# Patient Record
Sex: FEMALE | Race: BLACK OR AFRICAN AMERICAN | NOT HISPANIC OR LATINO | Employment: UNEMPLOYED | URBAN - METROPOLITAN AREA
[De-identification: names, ages, dates, MRNs, and addresses within clinical notes are randomized per-mention and may not be internally consistent; named-entity substitution may affect disease eponyms.]

---

## 2017-08-12 ENCOUNTER — HOSPITAL ENCOUNTER (EMERGENCY)
Facility: HOSPITAL | Age: 62
Discharge: HOME | End: 2017-08-12
Attending: EMERGENCY MEDICINE
Payer: MEDICAID

## 2017-08-12 VITALS
WEIGHT: 165 LBS | OXYGEN SATURATION: 98 % | HEIGHT: 61 IN | DIASTOLIC BLOOD PRESSURE: 82 MMHG | RESPIRATION RATE: 18 BRPM | HEART RATE: 88 BPM | SYSTOLIC BLOOD PRESSURE: 142 MMHG | BODY MASS INDEX: 31.15 KG/M2 | TEMPERATURE: 99 F

## 2017-08-12 DIAGNOSIS — T63.481A LOCAL REACTION TO INSECT STING, ACCIDENTAL OR UNINTENTIONAL, INITIAL ENCOUNTER: Primary | ICD-10-CM

## 2017-08-12 PROCEDURE — 63600175 PHARM REV CODE 636 W HCPCS: Performed by: NURSE PRACTITIONER

## 2017-08-12 PROCEDURE — 99283 EMERGENCY DEPT VISIT LOW MDM: CPT | Mod: 25

## 2017-08-12 PROCEDURE — 96372 THER/PROPH/DIAG INJ SC/IM: CPT

## 2017-08-12 PROCEDURE — 25000003 PHARM REV CODE 250: Performed by: NURSE PRACTITIONER

## 2017-08-12 RX ORDER — CETIRIZINE HYDROCHLORIDE 10 MG/1
10 TABLET ORAL
Status: COMPLETED | OUTPATIENT
Start: 2017-08-12 | End: 2017-08-12

## 2017-08-12 RX ORDER — FAMOTIDINE 20 MG/1
20 TABLET, FILM COATED ORAL
Status: COMPLETED | OUTPATIENT
Start: 2017-08-12 | End: 2017-08-12

## 2017-08-12 RX ORDER — METHYLPREDNISOLONE SODIUM SUCCINATE 125 MG/2ML
125 INJECTION INTRAMUSCULAR; INTRAVENOUS
Status: COMPLETED | OUTPATIENT
Start: 2017-08-12 | End: 2017-08-12

## 2017-08-12 RX ADMIN — CETIRIZINE HYDROCHLORIDE 10 MG: 10 TABLET, FILM COATED ORAL at 11:08

## 2017-08-12 RX ADMIN — METHYLPREDNISOLONE SODIUM SUCCINATE 125 MG: 125 INJECTION, POWDER, FOR SOLUTION INTRAMUSCULAR; INTRAVENOUS at 11:08

## 2017-08-12 RX ADMIN — FAMOTIDINE 20 MG: 20 TABLET, FILM COATED ORAL at 11:08

## 2017-08-13 NOTE — DISCHARGE INSTRUCTIONS
Take zyrtec and pepcid over the counter for one week.  Return to the Emergency department for any worsening or failure to improve, otherwise follow up with your primary care provider.

## 2017-08-13 NOTE — ED TRIAGE NOTES
Patient states she was sitting outside and something bit her left upper arm and left ankle. Patient stats she took benadryl at 730 pm. Patient states the swelling is worse and painful.

## 2017-08-13 NOTE — ED PROVIDER NOTES
Encounter Date: 8/12/2017       History     Chief Complaint   Patient presents with    Insect Bite     Stated was bit today by and insect now her left lower arm above her elbow is swollen,burning,and is redden feels like its on fire happen yesterday evening..Stated took Benadryl po early today.     Chief complaint: Insect bite    History of present illness: Patient is a 62-year-old female who presents for emergency consideration of a one-day history of left arm insect bite.  She states that she was stung or bitten by an insect on the left arm and since then has had constant itching and burning that has become worsening and is currently at 10 out of 10 in severity.  She tried hydrocortisone cream is been ineffective.  She denies fever, chills, shortness of breath, wheezing or area and reports some nausea.    HPI  Review of patient's allergies indicates:   Allergen Reactions    Benadryl [diphenhydramine hcl]      cuase her bp to elevate    Prednisone Other (See Comments)     Reactions:  Increased blood pressure    Penicillins      Past Medical History:   Diagnosis Date    High cholesterol     Hypertension      Past Surgical History:   Procedure Laterality Date    BUNIONECTOMY      LEG TENDON SURGERY Right      Family History   Problem Relation Age of Onset    Cancer Mother     No Known Problems Father      Social History   Substance Use Topics    Smoking status: Never Smoker    Smokeless tobacco: Never Used    Alcohol use Yes      Comment: 3-6 beers per night     Review of Systems   Constitutional: Negative for chills, fatigue and fever.   HENT: Negative for congestion, ear discharge, ear pain, postnasal drip, rhinorrhea, sinus pressure, sneezing, sore throat and voice change.    Eyes: Negative for discharge and itching.   Respiratory: Negative for cough, shortness of breath and wheezing.    Cardiovascular: Negative for chest pain, palpitations and leg swelling.   Gastrointestinal: Negative for abdominal  pain, constipation, diarrhea, nausea and vomiting.   Endocrine: Negative for polydipsia, polyphagia and polyuria.   Genitourinary: Negative for dysuria, frequency, hematuria, urgency, vaginal bleeding, vaginal discharge and vaginal pain.   Musculoskeletal: Negative for arthralgias and myalgias.   Skin: Positive for rash. Negative for wound.   Neurological: Negative for dizziness, seizures, syncope, weakness and numbness.   Hematological: Negative for adenopathy. Does not bruise/bleed easily.   Psychiatric/Behavioral: Negative for self-injury and suicidal ideas. The patient is not nervous/anxious.        Physical Exam     Initial Vitals [08/12/17 2205]   BP Pulse Resp Temp SpO2   (!) 152/100 94 12 99 °F (37.2 °C) 97 %      MAP       117.33         Physical Exam    Nursing note and vitals reviewed.  Constitutional: She appears well-developed and well-nourished.   HENT:   Head: Normocephalic and atraumatic.   Right Ear: External ear normal.   Left Ear: External ear normal.   Nose: Nose normal.   Eyes: Conjunctivae and EOM are normal. Pupils are equal, round, and reactive to light. Right eye exhibits no discharge. Left eye exhibits no discharge.   Neck: Normal range of motion.   Abdominal: She exhibits no distension.   Musculoskeletal: Normal range of motion.   Neurological: She is alert and oriented to person, place, and time.   Skin: Skin is dry. Capillary refill takes less than 2 seconds. Rash noted. Rash is urticarial (to left upper arm).         ED Course   Procedures  Labs Reviewed - No data to display          Medical Decision Making:   Initial Assessment:   62 y.o. female presents for emergent evaluation of rash to the left upper arm  ED Management:  Following a thorough history and physical, the patient was given site Medrol 125 IM as well as 20 mg of Pepcid and 10 mg of Zyrtec by mouth.  Patient has no difficulty breathing so anaphylaxis is unlikely.  Also her pulse ox is 97%.  Also unlikely is angioedema as  she has no facial swelling.  She understands that she should return to the emergency department for any failure to resolve or worsening.  Care of the patient discussed with Dr. Robles who agreed with the assessment and plan.                Attending Attestation:     Physician Attestation Statement for NP/PA:   I discussed this assessment and plan of this patient with the NP/PA, but I did not personally examine the patient. The face to face encounter was performed by the NP/PA.    Other NP/PA Attestation Additions:      Medical Decision Making: Agree with assessment and plan.                 ED Course     Clinical Impression:   The encounter diagnosis was Local reaction to insect sting, accidental or unintentional, initial encounter.    Disposition:   Disposition: Discharged  Condition: Stable                        Augusto Oden DNP  08/12/17 2315       Alfonso Robles MD  08/13/17 0000

## 2018-02-05 ENCOUNTER — HOSPITAL ENCOUNTER (EMERGENCY)
Facility: HOSPITAL | Age: 63
Discharge: HOME | End: 2018-02-05
Attending: EMERGENCY MEDICINE
Payer: MEDICAID

## 2018-02-05 VITALS
BODY MASS INDEX: 31.15 KG/M2 | HEART RATE: 82 BPM | RESPIRATION RATE: 18 BRPM | SYSTOLIC BLOOD PRESSURE: 165 MMHG | DIASTOLIC BLOOD PRESSURE: 95 MMHG | OXYGEN SATURATION: 96 % | WEIGHT: 165 LBS | HEIGHT: 61 IN | TEMPERATURE: 99 F

## 2018-02-05 DIAGNOSIS — M25.569 MEDIAL KNEE PAIN, UNSPECIFIED LATERALITY: ICD-10-CM

## 2018-02-05 DIAGNOSIS — R52 PAIN: ICD-10-CM

## 2018-02-05 DIAGNOSIS — M25.562 ACUTE PAIN OF LEFT KNEE: Primary | ICD-10-CM

## 2018-02-05 PROCEDURE — 99283 EMERGENCY DEPT VISIT LOW MDM: CPT | Mod: 25

## 2018-02-05 PROCEDURE — 63600175 PHARM REV CODE 636 W HCPCS: Performed by: EMERGENCY MEDICINE

## 2018-02-05 PROCEDURE — 96372 THER/PROPH/DIAG INJ SC/IM: CPT

## 2018-02-05 RX ORDER — HYDRALAZINE HYDROCHLORIDE 20 MG/ML
10 INJECTION INTRAMUSCULAR; INTRAVENOUS ONCE
Status: DISCONTINUED | OUTPATIENT
Start: 2018-02-05 | End: 2018-02-05 | Stop reason: HOSPADM

## 2018-02-05 RX ORDER — NALBUPHINE HYDROCHLORIDE 10 MG/ML
5 INJECTION, SOLUTION INTRAMUSCULAR; INTRAVENOUS; SUBCUTANEOUS ONCE
Status: COMPLETED | OUTPATIENT
Start: 2018-02-05 | End: 2018-02-05

## 2018-02-05 RX ORDER — KETOROLAC TROMETHAMINE 30 MG/ML
30 INJECTION, SOLUTION INTRAMUSCULAR; INTRAVENOUS
Status: COMPLETED | OUTPATIENT
Start: 2018-02-05 | End: 2018-02-05

## 2018-02-05 RX ADMIN — NALBUPHINE HYDROCHLORIDE 5 MG: 10 INJECTION, SOLUTION INTRAMUSCULAR; INTRAVENOUS; SUBCUTANEOUS at 05:02

## 2018-02-05 RX ADMIN — KETOROLAC TROMETHAMINE 30 MG: 30 INJECTION, SOLUTION INTRAMUSCULAR at 05:02

## 2018-02-05 NOTE — ED NOTES
Past Medical History:   Diagnosis Date    High cholesterol     Hypertension      Pt presented to ed with co left knee to foot pain and radiating to thigh.. Pt states she has chronic knee pain.  Also report left great toe pain.  Taking Advil with no relief.

## 2018-02-05 NOTE — ED PROVIDER NOTES
"Encounter Date: 2/5/2018       History     Chief Complaint   Patient presents with    Knee Pain     States she has right knee pain that radiates to her foot.       62 F with (r) knee pain for several days; no acute trauma. On going issue.  States pain "goes down to my foot."  No concommitant back pain.          Review of patient's allergies indicates:   Allergen Reactions    Benadryl [diphenhydramine hcl]      cuase her bp to elevate    Prednisone Other (See Comments)     Reactions:  Increased blood pressure    Penicillins      Past Medical History:   Diagnosis Date    High cholesterol     Hypertension      Past Surgical History:   Procedure Laterality Date    BUNIONECTOMY      LEG TENDON SURGERY Right      Family History   Problem Relation Age of Onset    Cancer Mother     No Known Problems Father      Social History   Substance Use Topics    Smoking status: Never Smoker    Smokeless tobacco: Never Used    Alcohol use Yes      Comment: 3-6 beers per night     Review of Systems   Constitutional: Negative for fever.   HENT: Negative for sore throat.    Respiratory: Negative for shortness of breath.    Cardiovascular: Negative for chest pain.   Gastrointestinal: Negative for nausea.   Genitourinary: Negative for dysuria.   Musculoskeletal: Negative for back pain.   Skin: Negative for rash.   Neurological: Negative for weakness.   Hematological: Does not bruise/bleed easily.       Physical Exam     Initial Vitals [02/05/18 1305]   BP Pulse Resp Temp SpO2   (!) 181/100 89 16 98.6 °F (37 °C) 96 %      MAP       127         Physical Exam    Nursing note and vitals reviewed.  Constitutional: She appears well-developed and well-nourished. She is not diaphoretic. No distress.   HENT:   Head: Normocephalic and atraumatic.   Mouth/Throat: Oropharynx is clear and moist.   Eyes: Conjunctivae and EOM are normal. Pupils are equal, round, and reactive to light.   Neck: Normal range of motion. Neck supple. No " "Brudzinski's sign and no Kernig's sign noted. Carotid bruit is not present. No JVD present.   Cardiovascular: Normal rate, regular rhythm, normal heart sounds and intact distal pulses. Exam reveals no gallop and no friction rub.    No murmur heard.  Pulmonary/Chest: Breath sounds normal. No respiratory distress. She has no wheezes. She has no rhonchi. She has no rales.   Abdominal: Soft. Bowel sounds are normal. She exhibits no distension and no mass. There is no tenderness. There is no CVA tenderness.   Musculoskeletal: Normal range of motion.   Knee Exam: minor effusion; stable to varus and valgus stressors; (+) patellar apprehension test; normal "q" angle noted; Neg Drawer's sign and Neg Lachmann's sign   Neurological: She is alert and oriented to person, place, and time.   Skin: Skin is warm and dry. No rash noted. No pallor.   Psychiatric: She has a normal mood and affect. Her behavior is normal. Judgment and thought content normal.         ED Course   Procedures  Labs Reviewed - No data to display     Imaging Results          X-Ray Knee Complete 4 or more Views Left (Final result)  Result time 02/05/18 16:16:45   Procedure changed from X-Ray Knee Complete 4 or more Views Right     Final result by Baudilio Manning MD (02/05/18 16:16:45)                 Impression:        No acute displaced fracture or dislocation identified.      Electronically signed by: BAUDILIO MANNING MD, MD  Date:     02/05/18  Time:    16:16              Narrative:    COMPARISON: Left knee series 3/20/13    FINDINGS: 4 views left knee.        Overall alignment is within normal limits.   No acute displaced fracture, dislocation, or destructive osseous process identified.  Mild degenerative change at the medial and patellofemoral compartments.  No large suprapatellar joint effusion. No subcutaneous emphysema or radiodense retained foreign body.                                                   ED Course     x-rays of the knee are positive for " chronic degenerative changes but no acute fracture or bursitis noted.  There is no large knee effusion or signs of a synovitis on routine radiograph.  Her pain was significantly ameliorated with Toradol Im.  The patient was noted to have hypertensive urgency here in the department: It spontaneously ameliorated with pain medication.  I talked to her about the dangers of high blood pressure: I instructed her to do a five-day blood pressure check 3 times a day and then take that to her primary care physician for potential further interventions for control of her blood pressure.  She verbalized understanding and agreed to comply.    Nonsteroidal anti-inflammatory agents will be used to control her pain.  Moist heat applied.  Orthopedic referral as need be.  Clinical Impression:   The primary encounter diagnosis was Acute pain of left knee. Diagnoses of Pain and Medial knee pain, unspecified laterality were also pertinent to this visit.                           Rubén Brewer MD  02/22/18 [id number]

## 2018-02-06 NOTE — DISCHARGE INSTRUCTIONS
Moist heat applied to the left knee, anterior and posterior aspects, as tolerated for pain relief.    He may take ibuprofen or Aleve according to the package insert instructions.    Five-day blood pressure check as we discussed: Testicular blood pressure 3 times a day, roughly around the same time of day after resting for 10 minutes.  Take this information to your primary care physician for follow-up and potential further interventions for blood pressure management.

## 2018-11-10 ENCOUNTER — HOSPITAL ENCOUNTER (EMERGENCY)
Facility: HOSPITAL | Age: 63
Discharge: HOME | End: 2018-11-10
Attending: EMERGENCY MEDICINE
Payer: MEDICAID

## 2018-11-10 VITALS
WEIGHT: 165 LBS | HEART RATE: 99 BPM | DIASTOLIC BLOOD PRESSURE: 95 MMHG | RESPIRATION RATE: 16 BRPM | BODY MASS INDEX: 31.15 KG/M2 | TEMPERATURE: 98 F | SYSTOLIC BLOOD PRESSURE: 135 MMHG | OXYGEN SATURATION: 98 % | HEIGHT: 61 IN

## 2018-11-10 DIAGNOSIS — R05.9 COUGH: ICD-10-CM

## 2018-11-10 DIAGNOSIS — R06.02 SHORTNESS OF BREATH: Primary | ICD-10-CM

## 2018-11-10 PROCEDURE — 99284 EMERGENCY DEPT VISIT MOD MDM: CPT | Mod: 25

## 2018-11-10 PROCEDURE — 25000242 PHARM REV CODE 250 ALT 637 W/ HCPCS: Performed by: PHYSICIAN ASSISTANT

## 2018-11-10 PROCEDURE — 93005 ELECTROCARDIOGRAM TRACING: CPT

## 2018-11-10 PROCEDURE — 93010 ELECTROCARDIOGRAM REPORT: CPT | Mod: ,,, | Performed by: INTERNAL MEDICINE

## 2018-11-10 PROCEDURE — 94640 AIRWAY INHALATION TREATMENT: CPT

## 2018-11-10 RX ORDER — PROMETHAZINE HYDROCHLORIDE AND DEXTROMETHORPHAN HYDROBROMIDE 6.25; 15 MG/5ML; MG/5ML
5 SYRUP ORAL EVERY 4 HOURS PRN
Qty: 118 ML | Refills: 0 | Status: ON HOLD | OUTPATIENT
Start: 2018-11-10 | End: 2020-02-12 | Stop reason: HOSPADM

## 2018-11-10 RX ORDER — IPRATROPIUM BROMIDE AND ALBUTEROL SULFATE 2.5; .5 MG/3ML; MG/3ML
3 SOLUTION RESPIRATORY (INHALATION)
Status: COMPLETED | OUTPATIENT
Start: 2018-11-10 | End: 2018-11-10

## 2018-11-10 RX ADMIN — IPRATROPIUM BROMIDE AND ALBUTEROL SULFATE 3 ML: .5; 2.5 SOLUTION RESPIRATORY (INHALATION) at 02:11

## 2018-11-10 NOTE — ED TRIAGE NOTES
Patient reports a dry cough x 3 weeks, was treated at urgent care with steroids with no relief.  Patient also reports sinus congestion.  Denies fever.

## 2018-11-10 NOTE — DISCHARGE INSTRUCTIONS
You can continue using your prescribed Albuterol inhaler as needed.    You have been prescribed Promethazine DM cough syrup to take as directed.    You can also take a daily anti histamine such as Claritin, Clarinex, Zyrtec, Xyzal, etc.    Monitor yourself for fever and take Tylenol or Ibuprofen as needed.    Follow up with primary care.    Return to the Er for any concerns.

## 2019-07-10 ENCOUNTER — HOSPITAL ENCOUNTER (EMERGENCY)
Facility: HOSPITAL | Age: 64
Discharge: HOME | End: 2019-07-10
Attending: EMERGENCY MEDICINE
Payer: MEDICAID

## 2019-07-10 VITALS
BODY MASS INDEX: 31.15 KG/M2 | WEIGHT: 165 LBS | HEIGHT: 61 IN | OXYGEN SATURATION: 97 % | TEMPERATURE: 98 F | HEART RATE: 80 BPM | RESPIRATION RATE: 20 BRPM | SYSTOLIC BLOOD PRESSURE: 179 MMHG | DIASTOLIC BLOOD PRESSURE: 95 MMHG

## 2019-07-10 DIAGNOSIS — M25.512 ACUTE PAIN OF LEFT SHOULDER: Primary | ICD-10-CM

## 2019-07-10 PROCEDURE — 99283 EMERGENCY DEPT VISIT LOW MDM: CPT | Mod: 25

## 2019-07-10 PROCEDURE — 25000003 PHARM REV CODE 250: Performed by: EMERGENCY MEDICINE

## 2019-07-10 RX ORDER — HYDROCODONE BITARTRATE AND ACETAMINOPHEN 5; 325 MG/1; MG/1
1 TABLET ORAL EVERY 6 HOURS PRN
Qty: 10 TABLET | Refills: 0 | Status: ON HOLD | OUTPATIENT
Start: 2019-07-10 | End: 2020-02-12 | Stop reason: HOSPADM

## 2019-07-10 RX ORDER — HYDROCODONE BITARTRATE AND ACETAMINOPHEN 5; 325 MG/1; MG/1
1 TABLET ORAL
Status: COMPLETED | OUTPATIENT
Start: 2019-07-10 | End: 2019-07-10

## 2019-07-10 RX ADMIN — HYDROCODONE BITARTRATE AND ACETAMINOPHEN 1 TABLET: 5; 325 TABLET ORAL at 02:07

## 2019-07-10 NOTE — ED PROVIDER NOTES
Encounter Date: 7/10/2019    SCRIBE #1 NOTE: I, Makayla Aguilar, am scribing for, and in the presence of, Solomon Ferrer MD.       History     Chief Complaint   Patient presents with    Shoulder Pain     Patient presents to the ER via personal vehicle. Patient presents with left shoulder pain that started yesterday at 2pm. reports she is unable to lift her arm. reports the only thing she can think may have caused it was her picking up a box.      This is a 63 y.o. female with PMHx of HTN, HLD presents to the ED complaining of left shoulder pain status post lifting 2 boxes 48 hour prior to arrival. Patient reports symptoms have gradually worsened to the point of being unable to now lift her left arm. She denies any left wrist or elbow pain. Patient denies trauma or falling. Denies lung or heart problems.             Review of patient's allergies indicates:   Allergen Reactions    Benadryl [diphenhydramine hcl]      cuase her bp to elevate    Prednisone Other (See Comments)     Reactions:  Increased blood pressure    Aspirin     Penicillins      Past Medical History:   Diagnosis Date    High cholesterol     Hypertension      Past Surgical History:   Procedure Laterality Date    BUNIONECTOMY      LEG TENDON SURGERY Right      Family History   Problem Relation Age of Onset    Cancer Mother     No Known Problems Father      Social History     Tobacco Use    Smoking status: Never Smoker    Smokeless tobacco: Never Used   Substance Use Topics    Alcohol use: Yes     Comment: 3-6 beers per night    Drug use: No     Review of Systems   Constitutional: Negative for fever.   HENT: Negative for sore throat.    Respiratory: Negative for shortness of breath.    Cardiovascular: Negative for chest pain.   Gastrointestinal: Negative for nausea.   Genitourinary: Negative for dysuria.   Musculoskeletal: Negative for back pain.        (+)left shoulder pain   Skin: Negative for rash.   Neurological: Negative for  weakness.   Hematological: Does not bruise/bleed easily.   Psychiatric/Behavioral: Negative for confusion.       Physical Exam     Initial Vitals [07/10/19 0224]   BP Pulse Resp Temp SpO2   (!) 201/116 89 18 98.3 °F (36.8 °C) 100 %      MAP       --         Physical Exam    Nursing note and vitals reviewed.  Constitutional: She appears well-developed and well-nourished. She is not diaphoretic. No distress.   HENT:   Mouth/Throat: Oropharynx is clear and moist.   Eyes: Pupils are equal, round, and reactive to light.   Neck: Neck supple.   Cardiovascular: Normal rate and regular rhythm.   Pulmonary/Chest: Breath sounds normal.   Abdominal: Soft. There is no tenderness.   Musculoskeletal: She exhibits no edema.        Left shoulder: She exhibits decreased range of motion, tenderness and pain.   Neurological: She is alert and oriented to person, place, and time.   Skin: Skin is warm and dry.   Psychiatric: She has a normal mood and affect.         ED Course   Procedures  Labs Reviewed - No data to display       Imaging Results          X-Ray Shoulder 2 or More Views Left (Final result)  Result time 07/10/19 04:07:25    Final result by Sanket Castillo MD (07/10/19 04:07:25)                 Impression:      No acute bony abnormality identified involving the left shoulder.      Electronically signed by: Sanket Castillo MD  Date:    07/10/2019  Time:    04:07             Narrative:    EXAMINATION:  XR SHOULDER COMPLETE 2 OR MORE VIEWS LEFT    CLINICAL HISTORY:  left shoulder pain;    TECHNIQUE:  Two or three views of the left shoulder were performed.    COMPARISON:  None.    FINDINGS:  No evidence of acute fracture or dislocation.  Soft tissues are symmetric.  No radiopaque foreign body.                                 Medical Decision Making:   Clinical Tests:   Radiological Study: Ordered and Reviewed      Pt arrived A/O x 4, afebrile, non-toxic in appearance, in no acute respiratory distress with VSS.  XR's negative  for any acute findings.  LUE has soft compartments and is neurovascularly intact.  5/5 strength and full ROM to the LUE except the L shoulder as pain is elicited with any active or passive ROM of the left shoulder.  Sx's likely secondary to an injured labrum or rotator cuff injury.  Pt given a sling for comfort and advised to move the shoulder as tolerated in order to avoid having adhesive capsulitis of the left shoulder.  Pt discharged and counseled on the need to return to the nearest emergency room if they experience any other concerning symptoms.  Pt counseled to F/U outpatient with a PCP over the next two to three days and to seek an Ortho referral if Sx's fail to improve.    Solomon Ferrer MD             Scribe Attestation:   Scribe #1: I performed the above scribed service and the documentation accurately describes the services I performed. I attest to the accuracy of the note.    Attending Attestation:           Physician Attestation for Scribe:  Physician Attestation Statement for Scribe #1: I, Solomon Ferrer MD, reviewed documentation, as scribed by Makayla Aguilar in my presence, and it is both accurate and complete.                    Clinical Impression:       ICD-10-CM ICD-9-CM   1. Acute pain of left shoulder M25.512 719.41                                Solomon Ferrer MD  07/15/19 0849

## 2019-07-10 NOTE — ED TRIAGE NOTES
Patient presents to the ER via personal vehicle. Patient presents with left shoulder pain that started yesterday at 2pm. reports she is unable to lift her arm. reports the only thing she can think may have caused it was her picking up a box.

## 2020-02-08 ENCOUNTER — HOSPITAL ENCOUNTER (EMERGENCY)
Facility: HOSPITAL | Age: 65
Discharge: HOME | End: 2020-02-08
Attending: EMERGENCY MEDICINE
Payer: MEDICAID

## 2020-02-08 VITALS
TEMPERATURE: 98 F | HEART RATE: 80 BPM | SYSTOLIC BLOOD PRESSURE: 127 MMHG | RESPIRATION RATE: 19 BRPM | WEIGHT: 149 LBS | DIASTOLIC BLOOD PRESSURE: 77 MMHG | OXYGEN SATURATION: 97 % | HEIGHT: 61 IN | BODY MASS INDEX: 28.13 KG/M2

## 2020-02-08 DIAGNOSIS — F41.9 ANXIETY: Primary | ICD-10-CM

## 2020-02-08 DIAGNOSIS — R42 DIZZINESS: ICD-10-CM

## 2020-02-08 LAB
ANION GAP SERPL CALC-SCNC: 18 MMOL/L (ref 8–16)
BUN SERPL-MCNC: 6 MG/DL (ref 6–30)
CHLORIDE SERPL-SCNC: 104 MMOL/L (ref 95–110)
CREAT SERPL-MCNC: 0.6 MG/DL (ref 0.5–1.4)
GLUCOSE SERPL-MCNC: 100 MG/DL (ref 70–110)
HCT VFR BLD CALC: 39 %PCV (ref 36–54)
POC IONIZED CALCIUM: 1.37 MMOL/L (ref 1.06–1.42)
POC TCO2 (MEASURED): 25 MMOL/L (ref 23–29)
POCT GLUCOSE: 93 MG/DL (ref 70–110)
POCT GLUCOSE: 96 MG/DL (ref 70–110)
POTASSIUM BLD-SCNC: 3.7 MMOL/L (ref 3.5–5.1)
SAMPLE: ABNORMAL
SODIUM BLD-SCNC: 142 MMOL/L (ref 136–145)

## 2020-02-08 PROCEDURE — 93010 EKG 12-LEAD: ICD-10-PCS | Mod: ,,, | Performed by: INTERNAL MEDICINE

## 2020-02-08 PROCEDURE — 93010 ELECTROCARDIOGRAM REPORT: CPT | Mod: ,,, | Performed by: INTERNAL MEDICINE

## 2020-02-08 PROCEDURE — 25000003 PHARM REV CODE 250: Performed by: EMERGENCY MEDICINE

## 2020-02-08 PROCEDURE — 82565 ASSAY OF CREATININE: CPT

## 2020-02-08 PROCEDURE — 99283 EMERGENCY DEPT VISIT LOW MDM: CPT | Mod: 25

## 2020-02-08 PROCEDURE — 93005 ELECTROCARDIOGRAM TRACING: CPT

## 2020-02-08 PROCEDURE — 84295 ASSAY OF SERUM SODIUM: CPT

## 2020-02-08 PROCEDURE — 99900035 HC TECH TIME PER 15 MIN (STAT)

## 2020-02-08 PROCEDURE — 84132 ASSAY OF SERUM POTASSIUM: CPT

## 2020-02-08 PROCEDURE — 82962 GLUCOSE BLOOD TEST: CPT

## 2020-02-08 PROCEDURE — 82330 ASSAY OF CALCIUM: CPT

## 2020-02-08 PROCEDURE — 85014 HEMATOCRIT: CPT

## 2020-02-08 RX ORDER — LORAZEPAM 1 MG/1
1 TABLET ORAL EVERY 8 HOURS PRN
Qty: 10 TABLET | Refills: 0 | Status: ON HOLD | OUTPATIENT
Start: 2020-02-08 | End: 2020-02-12 | Stop reason: HOSPADM

## 2020-02-08 RX ORDER — GABAPENTIN 300 MG/1
300 CAPSULE ORAL 3 TIMES DAILY
COMMUNITY
End: 2020-03-24

## 2020-02-08 RX ORDER — LORAZEPAM 0.5 MG/1
1 TABLET ORAL
Status: COMPLETED | OUTPATIENT
Start: 2020-02-08 | End: 2020-02-08

## 2020-02-08 RX ADMIN — LORAZEPAM 1 MG: 0.5 TABLET ORAL at 10:02

## 2020-02-08 NOTE — ED TRIAGE NOTES
"Pt states that she has been nervous for a couple of days and it wakes her up out of sleep and the feeling happens  between 9-10 pm.  PT states she beings to shake and when "daylight comes it is worse".    "

## 2020-02-08 NOTE — DISCHARGE INSTRUCTIONS
Please follow-up at the  [facility], Monday morning at 8:30 a.m. please return immediately if you get worse or if new problems develop.  Please follow-up with your primary care doctor this week.  Rest.  Lots of liquids.  Regular meals.

## 2020-02-08 NOTE — ED NOTES
Pt wanted RN to notify the MD that she has a metallic taste in her mouth and she is very lightheaded.  Pts sister asked if we could refer her to a Primary care Md and a neouologist

## 2020-02-08 NOTE — ED PROVIDER NOTES
Encounter Date: 2/8/2020    SCRIBE #1 NOTE: I, Karyn Stroud, am scribing for, and in the presence of,  Nilton Newell MD. I have scribed the following portions of the note - Other sections scribed: HPI, ROS.       History     Chief Complaint   Patient presents with    Anxiety     pt aaox4, here with sister lives alone; states feel anxious(pt tearful), general weakness, lightheaded, bad taste in mouth left hand feel more numb than past; denies unilateral weakness, cp, sob,  denies drinking, drug use     CC: Anxiety    HPI: This is a 64 y.o. female with a PMHx of hypertension and hyperlipidemia who presents to the Emergency Department with a cc of worsening anxiety for two days. Patient reports associated lightheadedness and difficulty sleeping. She also notes left shoulder pain and left hand numbness secondary to a stroke four months ago. Patient denies fever, chills, headache, nausea, vomiting, diarrhea, dysuria, or suicidal ideation. No worsening or alleviating factors are noted and no treatments have been tried. Patient reports no prior history of similar symptoms. She is not a smoker and does not drink or use drugs. Patient is allergic to Prednisone, Aspirin, Benadryl, and Penicillin.    The history is provided by the patient. No  was used.     Review of patient's allergies indicates:   Allergen Reactions    Benadryl [diphenhydramine hcl]      cuase her bp to elevate    Prednisone Other (See Comments)     Reactions:  Increased blood pressure    Aspirin     Penicillins      Past Medical History:   Diagnosis Date    High cholesterol     Hypertension      Past Surgical History:   Procedure Laterality Date    BUNIONECTOMY      LEG TENDON SURGERY Right      Family History   Problem Relation Age of Onset    Cancer Mother     No Known Problems Father      Social History     Tobacco Use    Smoking status: Never Smoker    Smokeless tobacco: Never Used   Substance Use Topics    Alcohol  use: Yes     Comment: 3-6 beers per night    Drug use: No     Review of Systems   Constitutional: Negative for chills and fever.   HENT: Negative for ear pain and sore throat.    Eyes: Negative for pain.   Respiratory: Negative for cough and shortness of breath.    Cardiovascular: Negative for chest pain and leg swelling.   Gastrointestinal: Negative for abdominal pain, diarrhea, nausea and vomiting.   Genitourinary: Negative for dysuria.   Musculoskeletal: Positive for arthralgias (Left shoulder). Negative for neck pain.   Skin: Negative for rash.   Neurological: Positive for light-headedness and numbness (Left hand). Negative for headaches.   Psychiatric/Behavioral: Positive for sleep disturbance. Negative for suicidal ideas. The patient is nervous/anxious.        Physical Exam     Initial Vitals [02/08/20 0943]   BP Pulse Resp Temp SpO2   (!) 149/84 101 17 98.8 °F (37.1 °C) 100 %      MAP       --         Physical Exam  The patient was examined specifically for the following:   General:No significant distress, Good color, Warm and dry. Head and neck:Scalp atraumatic, Neck supple. Neurological:Appropriate conversation, Gross motor deficits. Eyes:Conjugate gaze, Clear corneas. ENT: No epistaxis. Cardiac: Regular rate and rhythm, Grossly normal heart tones. Pulmonary: Wheezing, Rales. Gastrointestinal: Abdominal tenderness, Abdominal distention. Musculoskeletal: Extremity deformity, Apparent pain with range of motion of the joints. Skin: Rash.   The findings on examination were normal except for the following:  The patient does look anxious.  She is not suicidal homicidal or psychotic.  There is some weakness of the left upper extremity.  There is mild pain with range of motion of the left shoulder.  The lungs are clear.  The heart tones are normal. Patient does have a slight tachycardia with a heart rate of 101.  The abdomen is soft.  ED Course   Procedures  Labs Reviewed   ISTAT PROCEDURE - Abnormal; Notable for  the following components:       Result Value    POC Anion Gap 18 (*)     All other components within normal limits   POCT GLUCOSE   POCT GLUCOSE   ISTAT CHEM8     EKG Readings: (Independently Interpreted)   Patient's EKG reveals a normal sinus rhythm with a heart rate of 85.  There are no significant ST segment and T-wave changes.  There is no evidence of acute myocardial infarction or malignant arrhythmia.       Imaging Results    None                     Scribe Attestation:   Scribe #1: I performed the above scribed service and the documentation accurately describes the services I performed. I attest to the accuracy of the note.      Medical decision making:  Given the above, this patient presents to the emergency room complaining of anxiety. The patient reports that occasionally she has slight lightheadedness.  Chem 8 fails to reveal evidence of renal failure electrolyte abnormalities or anemia.  The EKG fails to reveal evidence of ischemia.  Vital signs are stable.  I will treat the patient with Ativan and discharge to outpatient evaluation and treatment.  I will refer to primary care, and outpatient psychiatry.                      Clinical Impression:     1. Anxiety    2. Dizziness               I personally performed the services described in this documentation.  All medical record  entries made by the scribe are at my direction and in my presence.  Signed, Dr. Reece Newell MD  02/08/20 5991

## 2020-02-10 ENCOUNTER — HOSPITAL ENCOUNTER (INPATIENT)
Facility: HOSPITAL | Age: 65
LOS: 2 days | Discharge: HOME | DRG: 069 | End: 2020-02-12
Attending: EMERGENCY MEDICINE | Admitting: INTERNAL MEDICINE
Payer: MEDICAID

## 2020-02-10 DIAGNOSIS — I63.9 CEREBROVASCULAR ACCIDENT (CVA), UNSPECIFIED MECHANISM: Primary | ICD-10-CM

## 2020-02-10 DIAGNOSIS — G89.29 CHRONIC LEFT SHOULDER PAIN: ICD-10-CM

## 2020-02-10 DIAGNOSIS — I63.9 STROKE: ICD-10-CM

## 2020-02-10 DIAGNOSIS — M25.512 CHRONIC LEFT SHOULDER PAIN: ICD-10-CM

## 2020-02-10 PROBLEM — R20.0 LEFT SIDED NUMBNESS: Status: ACTIVE | Noted: 2020-02-10

## 2020-02-10 LAB
ALBUMIN SERPL BCP-MCNC: 4.1 G/DL (ref 3.5–5.2)
ALP SERPL-CCNC: 86 U/L (ref 55–135)
ALT SERPL W/O P-5'-P-CCNC: 16 U/L (ref 10–44)
ANION GAP SERPL CALC-SCNC: 15 MMOL/L (ref 8–16)
ANION GAP SERPL CALC-SCNC: 9 MMOL/L (ref 8–16)
AST SERPL-CCNC: 15 U/L (ref 10–40)
BASOPHILS # BLD AUTO: 0.02 K/UL (ref 0–0.2)
BASOPHILS NFR BLD: 0.3 % (ref 0–1.9)
BILIRUB SERPL-MCNC: 1.4 MG/DL (ref 0.1–1)
BILIRUB UR QL STRIP: NEGATIVE
BUN SERPL-MCNC: 6 MG/DL (ref 6–30)
BUN SERPL-MCNC: 7 MG/DL (ref 8–23)
CALCIUM SERPL-MCNC: 10.7 MG/DL (ref 8.7–10.5)
CHLORIDE SERPL-SCNC: 105 MMOL/L (ref 95–110)
CHLORIDE SERPL-SCNC: 108 MMOL/L (ref 95–110)
CHOLEST SERPL-MCNC: 159 MG/DL (ref 120–199)
CHOLEST/HDLC SERPL: 3.5 {RATIO} (ref 2–5)
CLARITY UR: CLEAR
CO2 SERPL-SCNC: 26 MMOL/L (ref 23–29)
COLOR UR: ABNORMAL
CREAT SERPL-MCNC: 0.6 MG/DL (ref 0.5–1.4)
CREAT SERPL-MCNC: 0.8 MG/DL (ref 0.5–1.4)
DIFFERENTIAL METHOD: ABNORMAL
EOSINOPHIL # BLD AUTO: 0.1 K/UL (ref 0–0.5)
EOSINOPHIL NFR BLD: 0.9 % (ref 0–8)
ERYTHROCYTE [DISTWIDTH] IN BLOOD BY AUTOMATED COUNT: 13.5 % (ref 11.5–14.5)
EST. GFR  (AFRICAN AMERICAN): >60 ML/MIN/1.73 M^2
EST. GFR  (NON AFRICAN AMERICAN): >60 ML/MIN/1.73 M^2
GLUCOSE SERPL-MCNC: 91 MG/DL (ref 70–110)
GLUCOSE SERPL-MCNC: 92 MG/DL (ref 70–110)
GLUCOSE UR QL STRIP: NEGATIVE
HCT VFR BLD AUTO: 43.7 % (ref 37–48.5)
HCT VFR BLD CALC: 42 %PCV (ref 36–54)
HDLC SERPL-MCNC: 45 MG/DL (ref 40–75)
HDLC SERPL: 28.3 % (ref 20–50)
HGB BLD-MCNC: 13.7 G/DL (ref 12–16)
HGB UR QL STRIP: NEGATIVE
IMM GRANULOCYTES # BLD AUTO: 0.01 K/UL (ref 0–0.04)
IMM GRANULOCYTES NFR BLD AUTO: 0.1 % (ref 0–0.5)
INR PPP: 0.9 (ref 0.8–1.2)
KETONES UR QL STRIP: NEGATIVE
LDLC SERPL CALC-MCNC: 90 MG/DL (ref 63–159)
LEUKOCYTE ESTERASE UR QL STRIP: NEGATIVE
LYMPHOCYTES # BLD AUTO: 2.1 K/UL (ref 1–4.8)
LYMPHOCYTES NFR BLD: 30.9 % (ref 18–48)
MCH RBC QN AUTO: 26.9 PG (ref 27–31)
MCHC RBC AUTO-ENTMCNC: 31.4 G/DL (ref 32–36)
MCV RBC AUTO: 86 FL (ref 82–98)
MONOCYTES # BLD AUTO: 0.5 K/UL (ref 0.3–1)
MONOCYTES NFR BLD: 7.6 % (ref 4–15)
NEUTROPHILS # BLD AUTO: 4 K/UL (ref 1.8–7.7)
NEUTROPHILS NFR BLD: 60.2 % (ref 38–73)
NITRITE UR QL STRIP: NEGATIVE
NONHDLC SERPL-MCNC: 114 MG/DL
NRBC BLD-RTO: 0 /100 WBC
PH UR STRIP: 7 [PH] (ref 5–8)
PLATELET # BLD AUTO: 311 K/UL (ref 150–350)
PMV BLD AUTO: 9.9 FL (ref 9.2–12.9)
POC IONIZED CALCIUM: 1.36 MMOL/L (ref 1.06–1.42)
POC TCO2 (MEASURED): 27 MMOL/L (ref 23–29)
POCT GLUCOSE: 109 MG/DL (ref 70–110)
POTASSIUM BLD-SCNC: 3.7 MMOL/L (ref 3.5–5.1)
POTASSIUM SERPL-SCNC: 3.8 MMOL/L (ref 3.5–5.1)
PROT SERPL-MCNC: 7.7 G/DL (ref 6–8.4)
PROT UR QL STRIP: NEGATIVE
PROTHROMBIN TIME: 10.4 SEC (ref 9–12.5)
RBC # BLD AUTO: 5.1 M/UL (ref 4–5.4)
SAMPLE: NORMAL
SODIUM BLD-SCNC: 143 MMOL/L (ref 136–145)
SODIUM SERPL-SCNC: 143 MMOL/L (ref 136–145)
SP GR UR STRIP: >1.03 (ref 1–1.03)
T4 FREE SERPL-MCNC: 0.98 NG/DL (ref 0.71–1.51)
TRIGL SERPL-MCNC: 120 MG/DL (ref 30–150)
TSH SERPL DL<=0.005 MIU/L-ACNC: 4.58 UIU/ML (ref 0.4–4)
URN SPEC COLLECT METH UR: ABNORMAL
UROBILINOGEN UR STRIP-ACNC: NEGATIVE EU/DL
WBC # BLD AUTO: 6.7 K/UL (ref 3.9–12.7)

## 2020-02-10 PROCEDURE — 99203 PR OFFICE/OUTPT VISIT, NEW, LEVL III, 30-44 MIN: ICD-10-PCS | Mod: 95,,, | Performed by: PSYCHIATRY & NEUROLOGY

## 2020-02-10 PROCEDURE — 25500020 PHARM REV CODE 255: Performed by: INTERNAL MEDICINE

## 2020-02-10 PROCEDURE — 85025 COMPLETE CBC W/AUTO DIFF WBC: CPT

## 2020-02-10 PROCEDURE — 80053 COMPREHEN METABOLIC PANEL: CPT

## 2020-02-10 PROCEDURE — 80061 LIPID PANEL: CPT

## 2020-02-10 PROCEDURE — 25000003 PHARM REV CODE 250: Performed by: INTERNAL MEDICINE

## 2020-02-10 PROCEDURE — 99291 CRITICAL CARE FIRST HOUR: CPT | Mod: 25

## 2020-02-10 PROCEDURE — 93010 EKG 12-LEAD: ICD-10-PCS | Mod: ,,, | Performed by: INTERNAL MEDICINE

## 2020-02-10 PROCEDURE — 85610 PROTHROMBIN TIME: CPT

## 2020-02-10 PROCEDURE — 99232 PR SUBSEQUENT HOSPITAL CARE,LEVL II: ICD-10-PCS | Mod: ,,, | Performed by: PSYCHIATRY & NEUROLOGY

## 2020-02-10 PROCEDURE — 21400001 HC TELEMETRY ROOM

## 2020-02-10 PROCEDURE — 82803 BLOOD GASES ANY COMBINATION: CPT

## 2020-02-10 PROCEDURE — 93005 ELECTROCARDIOGRAM TRACING: CPT

## 2020-02-10 PROCEDURE — 81003 URINALYSIS AUTO W/O SCOPE: CPT

## 2020-02-10 PROCEDURE — 93010 ELECTROCARDIOGRAM REPORT: CPT | Mod: ,,, | Performed by: INTERNAL MEDICINE

## 2020-02-10 PROCEDURE — 84443 ASSAY THYROID STIM HORMONE: CPT

## 2020-02-10 PROCEDURE — 25000003 PHARM REV CODE 250: Performed by: EMERGENCY MEDICINE

## 2020-02-10 PROCEDURE — 99203 OFFICE O/P NEW LOW 30 MIN: CPT | Mod: 95,,, | Performed by: PSYCHIATRY & NEUROLOGY

## 2020-02-10 PROCEDURE — 99900035 HC TECH TIME PER 15 MIN (STAT)

## 2020-02-10 PROCEDURE — 84439 ASSAY OF FREE THYROXINE: CPT

## 2020-02-10 PROCEDURE — 99232 SBSQ HOSP IP/OBS MODERATE 35: CPT | Mod: ,,, | Performed by: PSYCHIATRY & NEUROLOGY

## 2020-02-10 RX ORDER — PANTOPRAZOLE SODIUM 40 MG/1
40 TABLET, DELAYED RELEASE ORAL DAILY
Status: DISCONTINUED | OUTPATIENT
Start: 2020-02-10 | End: 2020-02-12 | Stop reason: HOSPADM

## 2020-02-10 RX ORDER — FOLIC ACID 1 MG/1
1 TABLET ORAL DAILY
COMMUNITY
End: 2020-09-12

## 2020-02-10 RX ORDER — AMOXICILLIN 250 MG
1 CAPSULE ORAL 2 TIMES DAILY
Status: DISCONTINUED | OUTPATIENT
Start: 2020-02-10 | End: 2020-02-12 | Stop reason: HOSPADM

## 2020-02-10 RX ORDER — HYDROCODONE BITARTRATE AND ACETAMINOPHEN 5; 325 MG/1; MG/1
1 TABLET ORAL
Status: COMPLETED | OUTPATIENT
Start: 2020-02-10 | End: 2020-02-10

## 2020-02-10 RX ORDER — FOLIC ACID 1 MG/1
1 TABLET ORAL DAILY
Status: DISCONTINUED | OUTPATIENT
Start: 2020-02-10 | End: 2020-02-12 | Stop reason: HOSPADM

## 2020-02-10 RX ORDER — ATORVASTATIN CALCIUM 40 MG/1
40 TABLET, FILM COATED ORAL DAILY
Status: DISCONTINUED | OUTPATIENT
Start: 2020-02-10 | End: 2020-02-12 | Stop reason: HOSPADM

## 2020-02-10 RX ORDER — ONDANSETRON 2 MG/ML
4 INJECTION INTRAMUSCULAR; INTRAVENOUS EVERY 8 HOURS PRN
Status: DISCONTINUED | OUTPATIENT
Start: 2020-02-10 | End: 2020-02-12 | Stop reason: HOSPADM

## 2020-02-10 RX ORDER — TALC
6 POWDER (GRAM) TOPICAL NIGHTLY PRN
Status: DISCONTINUED | OUTPATIENT
Start: 2020-02-10 | End: 2020-02-12 | Stop reason: HOSPADM

## 2020-02-10 RX ORDER — GABAPENTIN 300 MG/1
300 CAPSULE ORAL 3 TIMES DAILY
Status: DISCONTINUED | OUTPATIENT
Start: 2020-02-10 | End: 2020-02-12 | Stop reason: HOSPADM

## 2020-02-10 RX ORDER — OMEPRAZOLE 20 MG/1
20 CAPSULE, DELAYED RELEASE ORAL DAILY
Status: ON HOLD | COMMUNITY
End: 2020-02-12 | Stop reason: HOSPADM

## 2020-02-10 RX ORDER — SODIUM CHLORIDE 0.9 % (FLUSH) 0.9 %
10 SYRINGE (ML) INJECTION
Status: DISCONTINUED | OUTPATIENT
Start: 2020-02-10 | End: 2020-02-12 | Stop reason: HOSPADM

## 2020-02-10 RX ORDER — ACETAMINOPHEN 325 MG/1
650 TABLET ORAL EVERY 4 HOURS PRN
Status: DISCONTINUED | OUTPATIENT
Start: 2020-02-10 | End: 2020-02-12 | Stop reason: HOSPADM

## 2020-02-10 RX ADMIN — ATORVASTATIN CALCIUM 40 MG: 40 TABLET, FILM COATED ORAL at 02:02

## 2020-02-10 RX ADMIN — PANTOPRAZOLE SODIUM 40 MG: 40 TABLET, DELAYED RELEASE ORAL at 02:02

## 2020-02-10 RX ADMIN — GABAPENTIN 300 MG: 300 CAPSULE ORAL at 09:02

## 2020-02-10 RX ADMIN — IOHEXOL 75 ML: 350 INJECTION, SOLUTION INTRAVENOUS at 10:02

## 2020-02-10 RX ADMIN — HYDROCODONE BITARTRATE AND ACETAMINOPHEN 1 TABLET: 5; 325 TABLET ORAL at 10:02

## 2020-02-10 RX ADMIN — GABAPENTIN 300 MG: 300 CAPSULE ORAL at 02:02

## 2020-02-10 RX ADMIN — FOLIC ACID 1 MG: 1 TABLET ORAL at 02:02

## 2020-02-10 NOTE — CONSULTS
[facility]  Vascular Neurology  Comprehensive Stroke Center  Tele-Consultation Note      Consults    Consulting Provider: YOLANDA FRANCISCO  Current Providers  No providers found    Patient Location:  [facility] EMERGENCY DEPARTMENT Emergency Department  Spoke hospital nurse at bedside with patient assisting consultant.     Patient information was obtained from patient.         Assessment/Plan:     STROKE DOCUMENTATION     Acute Stroke Times:   Acute Stroke Times   Last Known Normal Date: 02/09/20  Last Known Normal Time: 2200  Symptom Onset Date: 02/10/20  Symptom Onset Time: 0300  Stroke Team Called Date: 02/10/20  Stroke Team Called Time: 0940  Stroke Team Arrival Date: 02/10/20  Stroke Team Arrival Time: 0955  CT Interpretation Time: 0955    NIH Scale:  1a. Level of Consciousness: 0-->Alert, keenly responsive  1b. LOC Questions: 0-->Answers both questions correctly  1c. LOC Commands: 0-->Performs both tasks correctly  2. Best Gaze: 0-->Normal  3. Visual: 0-->No visual loss  4. Facial Palsy: 0-->Normal symmetrical movements  5a. Motor Arm, Left: 1-->Drift, limb holds 90 (or 45) degrees, but drifts down before full 10 seconds, does not hit bed or other support  5b. Motor Arm, Right: 0-->No drift, limb holds 90 (or 45) degrees for full 10 secs  6a. Motor Leg, Left: 0-->No drift, leg holds 30 degree position for full 5 secs  6b. Motor Leg, Right: 0-->No drift, leg holds 30 degree position for full 5 secs  7. Limb Ataxia: 0-->Absent  8. Sensory: 1-->Mild-to-moderate sensory loss, patient feels pinprick is less sharp or is dull on the affected side, or there is a loss of superficial pain with pinprick, but patient is aware of being touched  9. Best Language: 0-->No aphasia, normal  10. Dysarthria: 0-->Normal  11. Extinction and Inattention (formerly Neglect): 0-->No abnormality  Total (NIH Stroke Scale): 2     Modified Garza    Christian Coma Scale:    ABCD2 Score:    [id number] Score:  "  HAS -BLED Score:   ICH Score:   Hunt & Dixon Classification:       Diagnoses:   Left sided numbness  65 y/o woman with prior stroke (R-BG hemorrhage) presenting with mild L sided weakness/numbness.  Suspect recrudescence of prior stroke symptoms, but recommend MRI brain to r/o new infarct.        Blood pressure 122/84, pulse 89, temperature 99.1 °F (37.3 °C), temperature source Oral, resp. rate 19, height 5' 3" (1.6 m), weight 72.6 kg (160 lb), SpO2 99 %, not currently breastfeeding.  Alteplase Eligible?: No  Alteplase Recommendation: Alteplase not recommended due to Outside of treatment window  and Suspected stroke mimic   Possible Interventional Revascularization Candidate? No; No significant neurological deficit    Disposition Recommendation: admit to inpatient    Subjective:     History of Present Illness:  65 y/o woman with PMH prior stroke (R BG hemorrhage, no significant residuals), HLD, who presents with gait instability overnight.  She states that she woke up at 3 am and felt like she was drunk.  She also felt like her L side was heavy and her speech was garbled.  When she went to bed at 10 pm she felt at her baseline.  She is not currently on an antiplatelets (allergy to ASA?).      Woke up with symptoms?: yes    Recent bleeding noted: no  Does the patient take any Blood Thinners? no  Medications: No relevant medications      Past Medical History: hyperlipidemia and stroke    Past Surgical History: no major surgeries within the last 2 weeks    Family History: no relevant history    Social History: no smoking, no drinking, no drugs    Allergies: Benadryl [Diphenhydramine Hcl]  Prednisone  Aspirin  Penicillins No relevant allergies    Review of Systems   Constitutional: Negative for fever.   HENT: Negative for hearing loss.    Eyes: Negative for visual disturbance.   Respiratory: Negative for cough and shortness of breath.    Cardiovascular: Negative for chest pain.   Genitourinary: Negative for dysuria. " "  Neurological: Positive for weakness and numbness.     Objective:   Vitals: Blood pressure 122/84, pulse 89, temperature 99.1 °F (37.3 °C), temperature source Oral, resp. rate 19, height 5' 3" (1.6 m), weight 72.6 kg (160 lb), SpO2 99 %, not currently breastfeeding. Heart Rate: .    CT READ: Yes  No hemmorhage. No mass effect. No early infarct signs.     Physical Exam   Constitutional: No distress.   HENT:   Head: Normocephalic and atraumatic.   Eyes: Pupils are equal, round, and reactive to light. EOM are normal.   Pulmonary/Chest: Effort normal.   Neurological:   MS: A&XO3, speech fluent, follows commands, no neglect  CN: PERRL, EOMI, sensation intact, face symmetric, no dysarthria  Motor: slight "bobbing" drift on L arm  No leg drift  Sens: slightly decreased sensation L arm/leg  Coord: no ataxia on finger to nose                 Recommended the emergency room physician to have a brief discussion with the patient and/or family if available regarding the risks and benefits of treatment, and to briefly document the occurrence of that discussion in his clinical encounter note.     The attending portion of this evaluation, treatment, and documentation was performed per Anuradha Burger MD via audiovisual.    Billing code:  (non-intervention mild to moderate stroke, TIA, some mimics)    · This patient has a critical neurological condition/illness, with some potential for high morbidity and mortality.  · There is a moderate probability for acute neurological change leading to clinical and possibly life-threatening deterioration requiring highest level of physician preparedness for urgent intervention.  · Care was coordinated with other physicians involved in the patient's care.  · Radiologic studies and laboratory data were reviewed and interpreted, and plan of care was re-assessed based on the results.  · Diagnosis, treatment options and prognosis may have been discussed with the patient and/or family members or " caregiver.      In your opinion, this was a: Tier 2 Van Negative    Consult End Time: 10:19 AM     Anuradha Burger MD  [facility]  Vascular Neurology   [facility]

## 2020-02-10 NOTE — HOSPITAL COURSE
Pt admitted with multiple complaints - left sided weakness, HA and right shoulder pain.   MRI:  Impression:       Complex region in the right basal ganglia/subinsular cortex with blood fluid levels and small component of diffusion restriction.  The findings are most consistent with subacute hemorrhagic infarction in the right basal ganglia.     No new CVA findings. Possibly TIA events with presenting symptoms. Resumed gabapentin and tramadol or pain. ECHO pending. Consider dc home tomorrow.     Conclusion     · Normal left ventricular systolic function. The estimated ejection fraction is 65%.  · Normal LV diastolic function.  · Normal right ventricular systolic function.  · Moderate left atrial enlargement.  · Mild mitral regurgitation.  · The estimated PA systolic pressure is 18 mmHg.  · Normal central venous pressure (3 mmHg).     PT will dc home - now agrees to take aspirin as these symptoms likely TIA. Follow up PCP and neurology as scheduled. Counseled on stroke prevention with compliancy of medications and diet/exercise.  Continue outpatient PT. Resume gabapentin and PRN tramadol for pain.

## 2020-02-10 NOTE — CONSULTS
[facility]  Neurology  Consult Note    Patient Name: Grazyna Durand  MRN: [id number]  Admission Date: 2/10/2020  Hospital Length of Stay: 0 days  Code Status: Full Code   Attending Provider: Zeke Quesada MD   Consulting Provider: Rylie Dee DO  Primary Care Physician: Hugo Jurado MD  Principal Problem:Cerebrovascular accident (CVA)    Inpatient consult to Neurology  Consult performed by: Rylie Dee DO  Consult ordered by: Shira Boyd MD  Reason for consult: worsening left sided weakness  Assessment/Recommendations:          Subjective:     Chief Complaint:  Worsening left sided weakness    HPI:   65 yo woman with pmhx of right basal ganglia hemorrhage in October 2019 who was admitted for worsening left sided weakness. Patient states in October, she had numbness and weakness on the left side of her body. She states initially, she could not move the left side of her body, but this improved. She states it was never back to baseline and has always been a little bit weak. She states at that time, she had slurred speech which returned to baseline. She states this morning, when she woke up, she felt her tongue was heavy and her left sided weakness was worse. She came to the ED and had a CT head and CTA head and neck which showed no acute findings. She states she is feeling a little bit better now.      Past Medical History:   Diagnosis Date    Chronic pain     left arm    High cholesterol     Hypertension     Stroke     Octopber 2019 left arm weakness with pain       Past Surgical History:   Procedure Laterality Date    BUNIONECTOMY      LEG TENDON SURGERY Right        Review of patient's allergies indicates:   Allergen Reactions    Benadryl [diphenhydramine hcl]      cuase her bp to elevate    Prednisone Other (See Comments)     Reactions:  Increased blood pressure    Aspirin     Penicillins        Current Neurological Medications:     No current  facility-administered medications on file prior to encounter.      Current Outpatient Medications on File Prior to Encounter   Medication Sig    amlodipine (NORVASC) 10 MG tablet     atorvastatin (LIPITOR) 40 MG tablet Take 1 tablet (40 mg total) by mouth once daily.    folic acid (FOLVITE) 1 MG tablet Take 1 mg by mouth once daily.    gabapentin (NEURONTIN) 300 MG capsule Take 300 mg by mouth 3 (three) times daily.    omeprazole (PRILOSEC) 20 MG capsule Take 20 mg by mouth once daily.    hydrochlorothiazide (HYDRODIURIL) 12.5 MG Tab Take 1 tablet (12.5 mg total) by mouth once daily.    HYDROcodone-acetaminophen (NORCO) 5-325 mg per tablet Take 1 tablet by mouth every 6 (six) hours as needed for Pain.    LORazepam (ATIVAN) 1 MG tablet Take 1 tablet (1 mg total) by mouth every 8 (eight) hours as needed for Anxiety.    LOSARTAN POTASSIUM (LOSARTAN ORAL) Take by mouth.    pantoprazole (PROTONIX) 40 MG tablet Take 1 tablet (40 mg total) by mouth once daily.    promethazine-dextromethorphan (PROMETHAZINE-DM) 6.25-15 mg/5 mL Syrp Take 5 mLs by mouth every 4 (four) hours as needed (cough).     Family History     Problem Relation (Age of Onset)    Cancer Mother    No Known Problems Father        Tobacco Use    Smoking status: Never Smoker    Smokeless tobacco: Never Used   Substance and Sexual Activity    Alcohol use: Not Currently     Comment: quit 10/19    Drug use: No    Sexual activity: Never     Review of Systems - patient denies unsteady gait, vertigo, difficulty finding words, or visual deficit  Objective:     Vital Signs (Most Recent):  Temp: 97.5 °F (36.4 °C) (02/10/20 1342)  Pulse: 67 (02/10/20 1342)  Resp: 14 (02/10/20 1342)  BP: 126/78 (02/10/20 1342)  SpO2: 98 % (02/10/20 1342) Vital Signs (24h Range):  Temp:  [97.5 °F (36.4 °C)-99.1 °F (37.3 °C)] 97.5 °F (36.4 °C)  Pulse:  [67-90] 67  Resp:  [14-19] 14  SpO2:  [98 %-100 %] 98 %  BP: (116-137)/(78-84) 126/78     Weight: 73 kg (160 lb 15  oz)  Body mass index is 30.41 kg/m².     Physical Exam   Mental status: AAO x 3, no dysarthria, communicating appropriately  CN: PERRL, EOMI, VFF, V1-3 sensation intact, left facial droop, tongue midline  Motor  RUE 5/5   LUE 4/5 + drift  RLE 5/5  LLE 5/5    Sensory: intact to light touch throughout  Reflexes: 3+ left biceps and 3+ left knee jerk, otherwise 2+ throughout  Gait: steady, can walk unassisted    Significant Labs:   Reviewed    Significant Imaging:   Reviewed    Assessment and Plan:     Left sided numbness  Worsening left sided weakness/numbness likely due to recrudescence of old stroke symptoms as patient feels symptoms are improving now  Patient denies a history of seizures  Recommend r/o underlying infection as this can bring on recrudescence  Check MRI brain, if MRI brain shows new ischemic infarct - recommend start plavix 75mg daily as patient is allergic to aspirin  If MRI shows new ischemic infarct, recommend TTE  C/w statin  Neurochecks q 4 h  Check A1C  Telemetry monitoring  PT eval        VTE Risk Mitigation (From admission, onward)         Ordered     IP VTE LOW RISK PATIENT  Once      02/10/20 1339     Place BC hose  Until discontinued      02/10/20 1339                Thank you for your consult. I will follow-up with patient. Please contact us if you have any additional questions.    Rylie Dee, DO  Neurology  [facility]

## 2020-02-10 NOTE — PLAN OF CARE
Plan of care reviewed with patient.  Patient verbalized understanding and had no further questions.  Patient arrived from ED with mild left arm weakness, which was patients baseline at home.  Patient had CT, and awaiting results for MRI at this time.  Patient is a,a,ox4, VSS, and has no complaints at this time.  Patient resting comfortably in bed locked in lowest position, side rails up x3, and call bell in reach.

## 2020-02-10 NOTE — NURSING
Patient arrived to room 338 from ED on stretcher with transport.  Patient is a,a,ox4, VSS, and has no complaints at this time.  Will continue to monitor.

## 2020-02-10 NOTE — ED TRIAGE NOTES
"Patient states she woke around 3am "feeling drunk like & weak in my left side." States she went back to bed. Has a prior strock October 2019. Sent immediately to CT scan. Now in room. Placed cardiac monitor.   "

## 2020-02-10 NOTE — SUBJECTIVE & OBJECTIVE
Past Medical History:   Diagnosis Date    Chronic pain     left arm    High cholesterol     Hypertension     Stroke     Octopber 2019 left arm weakness with pain       Past Surgical History:   Procedure Laterality Date    BUNIONECTOMY      LEG TENDON SURGERY Right        Review of patient's allergies indicates:   Allergen Reactions    Benadryl [diphenhydramine hcl]      cuase her bp to elevate    Prednisone Other (See Comments)     Reactions:  Increased blood pressure    Aspirin     Penicillins        No current facility-administered medications on file prior to encounter.      Current Outpatient Medications on File Prior to Encounter   Medication Sig    amlodipine (NORVASC) 10 MG tablet     atorvastatin (LIPITOR) 40 MG tablet Take 1 tablet (40 mg total) by mouth once daily.    folic acid (FOLVITE) 1 MG tablet Take 1 mg by mouth once daily.    gabapentin (NEURONTIN) 300 MG capsule Take 300 mg by mouth 3 (three) times daily.    omeprazole (PRILOSEC) 20 MG capsule Take 20 mg by mouth once daily.    hydrochlorothiazide (HYDRODIURIL) 12.5 MG Tab Take 1 tablet (12.5 mg total) by mouth once daily.    HYDROcodone-acetaminophen (NORCO) 5-325 mg per tablet Take 1 tablet by mouth every 6 (six) hours as needed for Pain.    LORazepam (ATIVAN) 1 MG tablet Take 1 tablet (1 mg total) by mouth every 8 (eight) hours as needed for Anxiety.    LOSARTAN POTASSIUM (LOSARTAN ORAL) Take by mouth.    pantoprazole (PROTONIX) 40 MG tablet Take 1 tablet (40 mg total) by mouth once daily.    promethazine-dextromethorphan (PROMETHAZINE-DM) 6.25-15 mg/5 mL Syrp Take 5 mLs by mouth every 4 (four) hours as needed (cough).     Family History     Problem Relation (Age of Onset)    Cancer Mother    No Known Problems Father        Tobacco Use    Smoking status: Never Smoker    Smokeless tobacco: Never Used   Substance and Sexual Activity    Alcohol use: Not Currently     Comment: quit 10/19    Drug use: No    Sexual activity:  Never     Review of Systems   Constitutional: Positive for appetite change. Negative for diaphoresis, fatigue and fever.   HENT: Negative for congestion.    Eyes: Negative for pain.   Respiratory: Negative for cough, chest tightness and shortness of breath.    Cardiovascular: Negative for chest pain, palpitations and leg swelling.   Gastrointestinal: Negative for abdominal pain, diarrhea, nausea and vomiting.   Genitourinary: Negative for difficulty urinating.   Musculoskeletal: Positive for arthralgias.   Skin: Negative for color change.   Neurological: Positive for weakness. Negative for dizziness, tremors and seizures.   Psychiatric/Behavioral: Negative for agitation.     Objective:     Vital Signs (Most Recent):  Temp: 99.1 °F (37.3 °C) (02/10/20 0929)  Pulse: 81 (02/10/20 1127)  Resp: 19 (02/10/20 0929)  BP: 125/80 (02/10/20 1127)  SpO2: 100 % (02/10/20 1127) Vital Signs (24h Range):  Temp:  [99.1 °F (37.3 °C)] 99.1 °F (37.3 °C)  Pulse:  [80-90] 81  Resp:  [19] 19  SpO2:  [98 %-100 %] 100 %  BP: (122-137)/(80-84) 125/80     Weight: 72.6 kg (160 lb)  Body mass index is 28.34 kg/m².    Physical Exam   Constitutional: She is oriented to person, place, and time. She appears well-developed and well-nourished.   HENT:   Head: Normocephalic and atraumatic.   Nose: Nose normal.   Mouth/Throat: Oropharynx is clear and moist.   Eyes: Pupils are equal, round, and reactive to light. Conjunctivae and EOM are normal. No scleral icterus.   Neck: Normal range of motion. Neck supple. No JVD present. No tracheal deviation present. No thyromegaly present.   Cardiovascular: Normal rate, regular rhythm, normal heart sounds and intact distal pulses.   No murmur heard.  Pulmonary/Chest: Effort normal and breath sounds normal. No stridor. No respiratory distress. She has no wheezes.   Abdominal: Soft. Bowel sounds are normal. There is no tenderness.   Musculoskeletal: Normal range of motion. She exhibits no edema or deformity.    Neurological: She is alert and oriented to person, place, and time. No cranial nerve deficit.   Left-sided weakness with left pronator drift   Skin: Skin is warm and dry. She is not diaphoretic. No erythema.   Psychiatric: She has a normal mood and affect.         CRANIAL NERVES     CN III, IV, VI   Pupils are equal, round, and reactive to light.  Extraocular motions are normal.        Significant Labs: All pertinent labs within the past 24 hours have been reviewed.    Significant Imaging: I have reviewed all pertinent imaging results/findings within the past 24 hours.

## 2020-02-10 NOTE — ED PROVIDER NOTES
Encounter Date: 2/10/2020    SCRIBE #1 NOTE: I, Charles oClmenares, am scribing for, and in the presence of,  Shira Boyd MD. I have scribed the following portions of the note - Other sections scribed: HPI, ROS, and PE.       History     Chief Complaint   Patient presents with    Numbness     c/o numbness and weakness to Left side that started at 0300. pt denies taking blood thinners     CC: Weakness    HPI:  This is a 64 y.o. female with a PMHx of HLD, HTN, and Stroke. She presents to the Emergency Department with a cc of left-sided weakness which began approximately 6 hours PTA. The patient reports associated slurred speech. There were no alleviating or worsening factors reported; no attempt at treatment. Patient reports a prior history of similar symptoms 4 months ago secondary to a stroke. After the previous stroke, the patient states she was left with chronic numbness but her speech and gait were unaffected. She has been compliant with all medication.        The history is provided by the patient.    Chart review reveals that the patient had had a hemorrhagic, hypertensive basal ganglia stroke in the past.    Review of patient's allergies indicates:   Allergen Reactions    Benadryl [diphenhydramine hcl]      cuase her bp to elevate    Prednisone Other (See Comments)     Reactions:  Increased blood pressure    Aspirin     Penicillins      Past Medical History:   Diagnosis Date    Chronic pain     left arm    High cholesterol     Hypertension     Stroke     Octopber 2019 left arm weakness with pain     Past Surgical History:   Procedure Laterality Date    BUNIONECTOMY      LEG TENDON SURGERY Right      Family History   Problem Relation Age of Onset    Cancer Mother     No Known Problems Father      Social History     Tobacco Use    Smoking status: Never Smoker    Smokeless tobacco: Never Used   Substance Use Topics    Alcohol use: Not Currently     Comment: quit 10/19    Drug use: No     Review of  Systems   Constitutional: Negative for chills and fever.   HENT: Negative for ear pain and sore throat.    Eyes: Negative for visual disturbance.   Respiratory: Negative for cough and shortness of breath.    Cardiovascular: Negative for chest pain.   Gastrointestinal: Negative for abdominal pain, diarrhea, nausea and vomiting.   Genitourinary: Negative for dysuria.   Musculoskeletal: Negative for back pain.   Skin: Negative for rash.   Neurological: Positive for speech difficulty and weakness.   Psychiatric/Behavioral: Negative for confusion.       Physical Exam     Initial Vitals [02/10/20 0929]   BP Pulse Resp Temp SpO2   122/84 89 19 99.1 °F (37.3 °C) 99 %      MAP       --         Physical Exam    Nursing note and vitals reviewed.  Constitutional: She appears well-developed and well-nourished.   HENT:   Head: Normocephalic and atraumatic.   Eyes: EOM are normal. Pupils are equal, round, and reactive to light.   Neck: Normal range of motion. Neck supple.   Cardiovascular: Normal rate, regular rhythm and normal heart sounds.   Pulmonary/Chest: Breath sounds normal. No respiratory distress.   Abdominal: Soft. Bowel sounds are normal. There is no tenderness.   Neurological: She is alert and oriented to person, place, and time. GCS score is 15. GCS eye subscore is 4. GCS verbal subscore is 5. GCS motor subscore is 6.   Dysarthria. Left-sided pronator drift. Weakness to the left side when compared to the right    Skin: Skin is warm and dry. Capillary refill takes less than 2 seconds.   Psychiatric: She has a normal mood and affect.         ED Course   Procedures  Labs Reviewed   CBC W/ AUTO DIFFERENTIAL - Abnormal; Notable for the following components:       Result Value    Mean Corpuscular Hemoglobin 26.9 (*)     Mean Corpuscular Hemoglobin Conc 31.4 (*)     All other components within normal limits   COMPREHENSIVE METABOLIC PANEL   PROTIME-INR   TSH   LIPID PANEL   POCT GLUCOSE, HAND-HELD DEVICE   ISTAT PROCEDURE    ISTAT CHEM8     EKG Readings: (Independently Interpreted)   Initial Reading: No STEMI. Rhythm: Normal Sinus Rhythm. Heart Rate: 84. Clinical Impression: Normal Sinus Rhythm   Poor R Wave progression       Imaging Results          X-Ray Chest AP Portable (Final result)  Result time 02/10/20 10:07:21    Final result by Yvon Gomez MD (02/10/20 10:07:21)                 Impression:      No acute abnormality.      Electronically signed by: Yvon Gomez MD  Date:    02/10/2020  Time:    10:07             Narrative:    EXAMINATION:  XR CHEST AP PORTABLE    CLINICAL HISTORY:  Stroke;.    TECHNIQUE:  Single frontal portable view of the chest was performed.    COMPARISON:  11/10/2018    FINDINGS:  Support devices: None    The lungs are clear, with normal appearance of pulmonary vasculature and no pleural effusion or pneumothorax.    The cardiac silhouette is normal in size. The hilar and mediastinal contours are unremarkable.    Bones are intact.                               CT Head Without Contrast (Final result)  Result time 02/10/20 10:00:24    Final result by Antonio Tyson MD (02/10/20 10:00:24)                 Impression:      New remote appearing bilateral hemisphere changes suggestive possible chronic microvascular occlusive disease change of remote nature.  No acute process confirmed.  Depending on clinical information consideration for immediately or delayed outpatient MRI brain suggested as indicated      Electronically signed by: Antonio Tyson MD  Date:    02/10/2020  Time:    10:00             Narrative:    EXAMINATION:  CT HEAD WITHOUT CONTRAST    CLINICAL HISTORY:  Focal neuro deficit, new, fixed or worsening, >6 hours;    TECHNIQUE:  Low dose axial images were obtained through the head.  Coronal and sagittal reformations were also performed. Contrast was not administered.    COMPARISON:  Two thousand eleven    FINDINGS:  New asymmetrical atrophy prominence right sylvian fissure compared  to contralateral side, new foci hypoattenuation deep subcortical white matter particularly anterior frontal lobes, periventricular frontal horns involving left anterior basal ganglia internal capsule.  No new hemorrhage, subdural fluid or mass.                                 Medical Decision Making:   History:   Old Medical Records: I decided to obtain old medical records.  Independently Interpreted Test(s):   I have ordered and independently interpreted EKG Reading(s) - see prior notes  Clinical Tests:   Lab Tests: Ordered and Reviewed  Radiological Study: Ordered and Reviewed  Medical Tests: Ordered and Reviewed  ED Management:  0930: Initial assessment of the patient            Scribe Attestation:   Scribe #1: I performed the above scribed service and the documentation accurately describes the services I performed. I attest to the accuracy of the note.            ED Course as of Feb 10 1014   Mon Feb 10, 2020   0933 My stroke evaluation at triage reveals mild dysarthria, pronator drift and left leg weakness compared to the right.  No visual field deficits.  No neglect.  No aphasia.    [MH]   0939 MDM:  Patient woke up with symptoms at 3:00 a.m..  It has now been more than 6 hr since then.  Not VAN positive:  NO NEGLECT.  NO VISUAL FIELD DEFICITS.  NO APHASIA  I have ordered CTA head/neck    [MH]   1010 CBC is normal    [MH]   1010 I stat normal    []      ED Course User Index  [MH] Shira Boyd MD                Clinical Impression:     1. Cerebrovascular accident (CVA), unspecified mechanism    2. Stroke                 Scribe attestation: I personally performed the services described in this documentation. All medical record entries made by the scribe were at my direction and in my presence.  I have reviewed the chart and agree that the record reflects my personal performance and is accurate and complete. I attest that I personally performed the services documented by the scribe and acknowledged and  confirm the content of the note.   Nurses notes were reviewed.  Shira Boyd      10:20 AM  The medical management of Grazyna Durand has been transferred to the admitting team. Total Critical Care Time provided by me was: 45 minutes and included Continuous in-person monitoring of patient including vital signs, neurological status, NIHSS or ICP monitoring with ongoing treatment changes based on patient need  Coordination of care with other physicians  Review and interpretation of radiologic studies with re-assessment of plan of care  Review and interpretation of laboratory studies with re-assessment of plan of care  Review of diagnosis, treatment options and prognosis with patient.  At this time, the patient's condition is unchanged, and this was relayed to the accepting team.  Please see notes from the admitting team for continued care.  Shira Boyd 10:20 AM                   Shira Boyd MD  02/10/20 1021

## 2020-02-10 NOTE — HPI
65 yo woman with pmhx of right basal ganglia hemorrhage in October 2019 who was admitted for worsening left sided weakness. Patient states in October, she had numbness and weakness on the left side of her body. She states initially, she could not move the left side of her body, but this improved. She states it was never back to baseline and has always been a little bit weak. She states at that time, she had slurred speech which returned to baseline. She states this morning, when she woke up, she felt her tongue was heavy and her left sided weakness was worse. She came to the ED and had a CT head and CTA head and neck which showed no acute findings. She states she is feeling a little bit better now.

## 2020-02-10 NOTE — ASSESSMENT & PLAN NOTE
"  Pt agreed to take aspirin EC - previously refused due to "allergy" - not true allergy  Resume statin   ECHo pending        -Neuro - CVA - Left-sided Weakness and Slurred Speech.  Unclear if these are residual symptoms from previous stroke.  Her exam with LUE weakness and left hand numbness, but limited by pain in her shoulder which is chronic.  Does has a left pronator drift.  LEs with good motor function.       Evaluated by Vascular Surgery - Left sided numbness   63 y/o woman with prior stroke (R-BG hemorrhage) presenting with mild L sided weakness/numbness.  Suspect recrudescence of prior stroke symptoms, but recommend MRI brain to r/o new infarct.   Alteplase Eligible?: No   Alteplase Recommendation: Alteplase not recommended due to Outside of treatment window  and Suspected stroke mimic    Possible Interventional Revascularization Candidate? No; No significant neurological deficit   Disposition Recommendation: admit to inpatient.      CTH Without:  New remote appearing bilateral hemisphere changes suggestive possible chronic microvascular occlusive disease change of remote nature.  No acute process confirmed.  Depending on clinical information consideration for immediately or delayed outpatient MRI brain suggested as indicated.      CTA H/N with - 1. No acute/emergent abnormality appreciated.  No high-grade stenosis or major vessel occlusion.   2. Symmetric mild chronic microangiopathic ischemic white matter changes.   3. Right greater than left bilateral basal ganglia remote lacunar infarcts.      MRI B ordered and Neurology Consulted.      Patient admitted to [facility] in 10/2019 with Right basal ganglia hemorrhagic infarct with residual left-sided weakness and slurred speech and facial droop.   MRI in 10/2019 - Intraparenchymal hemorrhage centered along the posterior aspect of the right basal ganglia, similar to 10/12/2019.  No associated abnormal enhancing mass to suggest underlying neoplasm.  " Small focus of acute/subacute ischemia in the subcortical white matter in the right parietal lobe posterior to the afore mentioned intraparenchymal hemorrhage.       Evidence of mild patchy bilateral chronic white matter ischemia.       Mild generalized cerebral atrophy.

## 2020-02-10 NOTE — SUBJECTIVE & OBJECTIVE
"  Woke up with symptoms?: yes    Recent bleeding noted: no  Does the patient take any Blood Thinners? no  Medications: No relevant medications      Past Medical History: hyperlipidemia and stroke    Past Surgical History: no major surgeries within the last 2 weeks    Family History: no relevant history    Social History: no smoking, no drinking, no drugs    Allergies: Benadryl [Diphenhydramine Hcl]  Prednisone  Aspirin  Penicillins No relevant allergies    Review of Systems   Constitutional: Negative for fever.   HENT: Negative for hearing loss.    Eyes: Negative for visual disturbance.   Respiratory: Negative for cough and shortness of breath.    Cardiovascular: Negative for chest pain.   Genitourinary: Negative for dysuria.   Neurological: Positive for weakness and numbness.     Objective:   Vitals: Blood pressure 122/84, pulse 89, temperature 99.1 °F (37.3 °C), temperature source Oral, resp. rate 19, height 5' 3" (1.6 m), weight 72.6 kg (160 lb), SpO2 99 %, not currently breastfeeding. Heart Rate: .    CT READ: Yes  No hemmorhage. No mass effect. No early infarct signs.     Physical Exam   Constitutional: No distress.   HENT:   Head: Normocephalic and atraumatic.   Eyes: Pupils are equal, round, and reactive to light. EOM are normal.   Pulmonary/Chest: Effort normal.   Neurological:   MS: A&XO3, speech fluent, follows commands, no neglect  CN: PERRL, EOMI, sensation intact, face symmetric, no dysarthria  Motor: slight "bobbing" drift on L arm  No leg drift  Sens: slightly decreased sensation L arm/leg  Coord: no ataxia on finger to nose             "

## 2020-02-10 NOTE — SUBJECTIVE & OBJECTIVE
Past Medical History:   Diagnosis Date    Chronic pain     left arm    High cholesterol     Hypertension     Stroke     Octopber 2019 left arm weakness with pain       Past Surgical History:   Procedure Laterality Date    BUNIONECTOMY      LEG TENDON SURGERY Right        Review of patient's allergies indicates:   Allergen Reactions    Benadryl [diphenhydramine hcl]      cuase her bp to elevate    Prednisone Other (See Comments)     Reactions:  Increased blood pressure    Aspirin     Penicillins        Current Neurological Medications:     No current facility-administered medications on file prior to encounter.      Current Outpatient Medications on File Prior to Encounter   Medication Sig    amlodipine (NORVASC) 10 MG tablet     atorvastatin (LIPITOR) 40 MG tablet Take 1 tablet (40 mg total) by mouth once daily.    folic acid (FOLVITE) 1 MG tablet Take 1 mg by mouth once daily.    gabapentin (NEURONTIN) 300 MG capsule Take 300 mg by mouth 3 (three) times daily.    omeprazole (PRILOSEC) 20 MG capsule Take 20 mg by mouth once daily.    hydrochlorothiazide (HYDRODIURIL) 12.5 MG Tab Take 1 tablet (12.5 mg total) by mouth once daily.    HYDROcodone-acetaminophen (NORCO) 5-325 mg per tablet Take 1 tablet by mouth every 6 (six) hours as needed for Pain.    LORazepam (ATIVAN) 1 MG tablet Take 1 tablet (1 mg total) by mouth every 8 (eight) hours as needed for Anxiety.    LOSARTAN POTASSIUM (LOSARTAN ORAL) Take by mouth.    pantoprazole (PROTONIX) 40 MG tablet Take 1 tablet (40 mg total) by mouth once daily.    promethazine-dextromethorphan (PROMETHAZINE-DM) 6.25-15 mg/5 mL Syrp Take 5 mLs by mouth every 4 (four) hours as needed (cough).     Family History     Problem Relation (Age of Onset)    Cancer Mother    No Known Problems Father        Tobacco Use    Smoking status: Never Smoker    Smokeless tobacco: Never Used   Substance and Sexual Activity    Alcohol use: Not Currently     Comment: quit 10/19     Drug use: No    Sexual activity: Never     Review of Systems - patient denies unsteady gait, vertigo, difficulty finding words, or visual deficit  Objective:     Vital Signs (Most Recent):  Temp: 97.5 °F (36.4 °C) (02/10/20 1342)  Pulse: 67 (02/10/20 1342)  Resp: 14 (02/10/20 1342)  BP: 126/78 (02/10/20 1342)  SpO2: 98 % (02/10/20 1342) Vital Signs (24h Range):  Temp:  [97.5 °F (36.4 °C)-99.1 °F (37.3 °C)] 97.5 °F (36.4 °C)  Pulse:  [67-90] 67  Resp:  [14-19] 14  SpO2:  [98 %-100 %] 98 %  BP: (116-137)/(78-84) 126/78     Weight: 73 kg (160 lb 15 oz)  Body mass index is 30.41 kg/m².     Physical Exam     Mental status: AAO x 3, no dysarthria, communicating appropriately  CN: PERRL, EOMI, VFF, V1-3 sensation intact, left facial droop, tongue midline  Motor  RUE 5/5   LUE 4/5 + drift  RLE 5/5  LLE 5/5    Significant Labs:   Reviewed    Significant Imaging:   Reviewed

## 2020-02-10 NOTE — HPI
63 y/o woman with PMH prior stroke (R BG hemorrhage, no significant residuals), HLD, who presents with gait instability overnight.  She states that she woke up at 3 am and felt like she was drunk.  She also felt like her L side was heavy and her speech was garbled.  When she went to bed at 10 pm she felt at her baseline.  She is not currently on an antiplatelets (allergy to ASA?).

## 2020-02-10 NOTE — ASSESSMENT & PLAN NOTE
Worsening left sided weakness/numbness likely due to recrudescence of old stroke symptoms as patient feels symptoms are improving now  Patient denies a history of seizures  Recommend r/o underlying infection as this can bring on recrudescence  Check MRI brain, if MRI brain shows new ischemic infarct - recommend start plavix 75mg daily as patient is allergic to aspirin  If MRI shows new ischemic infarct, recommend TTE  C/w statin  Neurochecks q 4 h  PT eval

## 2020-02-10 NOTE — HPI
"This is a 64 y.o. female with a PMHx of HLD, HTN, and Stroke. She presents to the Emergency Department with a cc of left-sided weakness which began approximately 6 hours PTA. The patient reports associated slurred speech. There were no alleviating or worsening factors reported; no attempt at treatment. Patient reports a prior history of similar symptoms 4 months ago secondary to a stroke. After the previous stroke, the patient states she was left with chronic numbness but her speech and gait were unaffected. She has been compliant with all medication.    Patient was admitted to [facility] with a Hemorrhagic Stroke in 10/2019 - she presented with similar symptoms of left sided weakness.  She was discharged to home and went to stay with family in [place].  No formal Physical Therapy, but states her "family got me stronger".  She had a residual left shoulder pain and LUE weakness.  Patient presented this episode with feeling of "heavy speech" similar to her previous stroke presentation.  She has recurrent left-sided weakness.  No headache, no change in vision.  No chest pain or SOB.  No abdominal pain or dysuria.   "

## 2020-02-10 NOTE — H&P
"[facility] [place] [facility]  History & Physical    Patient Name: Grazyna Durand  MRN: [id number]  Admission Date: 2/10/2020  Attending Physician: Zeke Quesada MD   Primary Care Provider: Hugo Jurado MD         Patient information was obtained from patient, past medical records and ER records.     Subjective:     Principal Problem:Cerebrovascular accident (CVA)    Chief Complaint:   Chief Complaint   Patient presents with    Numbness     c/o numbness and weakness to Left side that started at 0300. pt denies taking blood thinners        HPI: This is a 64 y.o. female with a PMHx of HLD, HTN, and Stroke. She presents to the Emergency Department with a cc of left-sided weakness which began approximately 6 hours PTA. The patient reports associated slurred speech. There were no alleviating or worsening factors reported; no attempt at treatment. Patient reports a prior history of similar symptoms 4 months ago secondary to a stroke. After the previous stroke, the patient states she was left with chronic numbness but her speech and gait were unaffected. She has been compliant with all medication.    Patient was admitted to [place] with a Hemorrhagic Stroke in 10/2019 - she presented with similar symptoms of left sided weakness.  She was discharged to home and went to stay with family in [place].  No formal Physical Therapy, but states her "family got me stronger".  She had a residual left shoulder pain and LUE weakness.  Patient presented this episode with feeling of "heavy speech" similar to her previous stroke presentation.  She has recurrent left-sided weakness.  No headache, no change in vision.  No chest pain or SOB.  No abdominal pain or dysuria.     Past Medical History:   Diagnosis Date    Chronic pain     left arm    High cholesterol     Hypertension     Stroke     Octopber 2019 left arm weakness with pain       Past Surgical History:   Procedure Laterality Date    BUNIONECTOMY   "    LEG TENDON SURGERY Right        Review of patient's allergies indicates:   Allergen Reactions    Benadryl [diphenhydramine hcl]      cuase her bp to elevate    Prednisone Other (See Comments)     Reactions:  Increased blood pressure    Aspirin     Penicillins        No current facility-administered medications on file prior to encounter.      Current Outpatient Medications on File Prior to Encounter   Medication Sig    amlodipine (NORVASC) 10 MG tablet     atorvastatin (LIPITOR) 40 MG tablet Take 1 tablet (40 mg total) by mouth once daily.    folic acid (FOLVITE) 1 MG tablet Take 1 mg by mouth once daily.    gabapentin (NEURONTIN) 300 MG capsule Take 300 mg by mouth 3 (three) times daily.    omeprazole (PRILOSEC) 20 MG capsule Take 20 mg by mouth once daily.    hydrochlorothiazide (HYDRODIURIL) 12.5 MG Tab Take 1 tablet (12.5 mg total) by mouth once daily.    HYDROcodone-acetaminophen (NORCO) 5-325 mg per tablet Take 1 tablet by mouth every 6 (six) hours as needed for Pain.    LORazepam (ATIVAN) 1 MG tablet Take 1 tablet (1 mg total) by mouth every 8 (eight) hours as needed for Anxiety.    LOSARTAN POTASSIUM (LOSARTAN ORAL) Take by mouth.    pantoprazole (PROTONIX) 40 MG tablet Take 1 tablet (40 mg total) by mouth once daily.    promethazine-dextromethorphan (PROMETHAZINE-DM) 6.25-15 mg/5 mL Syrp Take 5 mLs by mouth every 4 (four) hours as needed (cough).     Family History     Problem Relation (Age of Onset)    Cancer Mother    No Known Problems Father        Tobacco Use    Smoking status: Never Smoker    Smokeless tobacco: Never Used   Substance and Sexual Activity    Alcohol use: Not Currently     Comment: quit 10/19    Drug use: No    Sexual activity: Never     Review of Systems   Constitutional: Positive for appetite change. Negative for diaphoresis, fatigue and fever.   HENT: Negative for congestion.    Eyes: Negative for pain.   Respiratory: Negative for cough, chest tightness and  shortness of breath.    Cardiovascular: Negative for chest pain, palpitations and leg swelling.   Gastrointestinal: Negative for abdominal pain, diarrhea, nausea and vomiting.   Genitourinary: Negative for difficulty urinating.   Musculoskeletal: Positive for arthralgias.   Skin: Negative for color change.   Neurological: Positive for weakness. Negative for dizziness, tremors and seizures.   Psychiatric/Behavioral: Negative for agitation.     Objective:     Vital Signs (Most Recent):  Temp: 99.1 °F (37.3 °C) (02/10/20 0929)  Pulse: 81 (02/10/20 1127)  Resp: 19 (02/10/20 0929)  BP: 125/80 (02/10/20 1127)  SpO2: 100 % (02/10/20 1127) Vital Signs (24h Range):  Temp:  [99.1 °F (37.3 °C)] 99.1 °F (37.3 °C)  Pulse:  [80-90] 81  Resp:  [19] 19  SpO2:  [98 %-100 %] 100 %  BP: (122-137)/(80-84) 125/80     Weight: 72.6 kg (160 lb)  Body mass index is 28.34 kg/m².    Physical Exam   Constitutional: She is oriented to person, place, and time. She appears well-developed and well-nourished.   HENT:   Head: Normocephalic and atraumatic.   Nose: Nose normal.   Mouth/Throat: Oropharynx is clear and moist.   Eyes: Pupils are equal, round, and reactive to light. Conjunctivae and EOM are normal. No scleral icterus.   Neck: Normal range of motion. Neck supple. No JVD present. No tracheal deviation present. No thyromegaly present.   Cardiovascular: Normal rate, regular rhythm, normal heart sounds and intact distal pulses.   No murmur heard.  Pulmonary/Chest: Effort normal and breath sounds normal. No stridor. No respiratory distress. She has no wheezes.   Abdominal: Soft. Bowel sounds are normal. There is no tenderness.   Musculoskeletal: Normal range of motion. She exhibits no edema or deformity.   Neurological: She is alert and oriented to person, place, and time. No cranial nerve deficit.   Left-sided weakness with left pronator drift   Skin: Skin is warm and dry. She is not diaphoretic. No erythema.   Psychiatric: She has a normal  mood and affect.         CRANIAL NERVES     CN III, IV, VI   Pupils are equal, round, and reactive to light.  Extraocular motions are normal.        Significant Labs: All pertinent labs within the past 24 hours have been reviewed.    Significant Imaging: I have reviewed all pertinent imaging results/findings within the past 24 hours.    Assessment/Plan:   Patient 64 y.o. female with a PMHx of HLD, HTN, and Stroke. She presents to the Emergency Department with a cc of left-sided weakness which began approximately 6 hours PTA. The patient reports associated slurred speech. There were no alleviating or worsening factors reported; no attempt at treatment. Patient reports a prior history of similar symptoms 4 months ago secondary to a stroke. After the previous stroke, the patient states she was left with chronic numbness but her speech and gait were unaffected. She has been compliant with all medication.      -Neuro - CVA - Left-sided Weakness and Slurred Speech.  Unclear if these are residual symptoms from previous stroke.  Her exam with LUE weakness and left hand numbness, but limited by pain in her shoulder which is chronic.  Does has a left pronator drift.  LEs with good motor function.       Evaluated by Vascular Surgery - Left sided numbness   65 y/o woman with prior stroke (R-BG hemorrhage) presenting with mild L sided weakness/numbness.  Suspect recrudescence of prior stroke symptoms, but recommend MRI brain to r/o new infarct.   Alteplase Eligible?: No   Alteplase Recommendation: Alteplase not recommended due to Outside of treatment window  and Suspected stroke mimic    Possible Interventional Revascularization Candidate? No; No significant neurological deficit   Disposition Recommendation: admit to inpatient.      CTH Without:  New remote appearing bilateral hemisphere changes suggestive possible chronic microvascular occlusive disease change of remote nature.  No acute process confirmed.  Depending on  clinical information consideration for immediately or delayed outpatient MRI brain suggested as indicated.      CTA H/N with - 1. No acute/emergent abnormality appreciated.  No high-grade stenosis or major vessel occlusion.   2. Symmetric mild chronic microangiopathic ischemic white matter changes.   3. Right greater than left bilateral basal ganglia remote lacunar infarcts.      MRI B ordered and Neurology Consulted.      Patient admitted to [place] in 10/2019 with Right basal ganglia hemorrhagic infarct with residual left-sided weakness and slurred speech and facial droop.   MRI in 10/2019 - Intraparenchymal hemorrhage centered along the posterior aspect of the right basal ganglia, similar to 10/12/2019.  No associated abnormal enhancing mass to suggest underlying neoplasm.  Small focus of acute/subacute ischemia in the subcortical white matter in the right parietal lobe posterior to the afore mentioned intraparenchymal hemorrhage.       Evidence of mild patchy bilateral chronic white matter ischemia.       Mild generalized cerebral atrophy.      -Cards - HTN. HLD.  /84 on admission.  Will allow for permissive HTN for now.  On Amlodipine as outpatient.  On Atorvastatin.      NM Stress in 3/2016 - Impression: NORMAL MYOCARDIAL PERFUSION   1. The perfusion scan is free of evidence for myocardial ischemia or injury.    2. Resting wall motion is physiologic.    3. Visually estimated LV function is normal.    4. The ventricular volumes are normal at rest and stress.    5. The extracardiac distribution of radioactivity is normal.      TTE in 3/2016 - CONCLUSIONS      1 - Normal left ventricular systolic function (EF 55-60%).  Normal wall motion.      2 - Concentric remodeling.      3 - Trivial mitral regurgitation.      4 - Trivial tricuspid regurgitation.      5 - The estimated PA systolic pressure is 28 mmHg.     -Endocrine - TSH 4.578 with normal fT4.  Possible Subclinical Hypothyroidism.  Follow up in 3  months.     -Psych - History of Alcohol Abuse.      -GI - GERD - continue PPI for now.     VTE Risk Mitigation (From admission, onward)    None             Zeke Quesada MD  Department of Hospital Medicine   [facility]

## 2020-02-10 NOTE — ASSESSMENT & PLAN NOTE
63 y/o woman with prior stroke (R-BG hemorrhage) presenting with mild L sided weakness/numbness.  Suspect recrudescence of prior stroke symptoms, but recommend MRI brain to r/o new infarct.

## 2020-02-10 NOTE — ASSESSMENT & PLAN NOTE
-Cards - HTN. HLD.  /84 on admission.  Will allow for permissive HTN for now.  On Amlodipine as outpatient.  On Atorvastatin.      NM Stress in 3/2016 - Impression: NORMAL MYOCARDIAL PERFUSION   1. The perfusion scan is free of evidence for myocardial ischemia or injury.    2. Resting wall motion is physiologic.    3. Visually estimated LV function is normal.    4. The ventricular volumes are normal at rest and stress.    5. The extracardiac distribution of radioactivity is normal.      TTE in 3/2016 - CONCLUSIONS      1 - Normal left ventricular systolic function (EF 55-60%).  Normal wall motion.      2 - Concentric remodeling.      3 - Trivial mitral regurgitation.      4 - Trivial tricuspid regurgitation.      5 - The estimated PA systolic pressure is 28 mmHg.

## 2020-02-11 PROBLEM — M25.512 LEFT SHOULDER PAIN: Status: ACTIVE | Noted: 2020-02-11

## 2020-02-11 LAB
ANION GAP SERPL CALC-SCNC: 10 MMOL/L (ref 8–16)
AORTIC ROOT ANNULUS: 2.64 CM
AORTIC VALVE CUSP SEPERATION: 1.79 CM
ASCENDING AORTA: 2.64 CM
AV INDEX (PROSTH): 0.66
AV MEAN GRADIENT: 3 MMHG
AV PEAK GRADIENT: 6 MMHG
AV VALVE AREA: 2.34 CM2
AV VELOCITY RATIO: 0.76
BASOPHILS # BLD AUTO: 0.01 K/UL (ref 0–0.2)
BASOPHILS NFR BLD: 0.1 % (ref 0–1.9)
BSA FOR ECHO PROCEDURE: 1.78 M2
BUN SERPL-MCNC: 9 MG/DL (ref 8–23)
CALCIUM SERPL-MCNC: 10.2 MG/DL (ref 8.7–10.5)
CHLORIDE SERPL-SCNC: 107 MMOL/L (ref 95–110)
CO2 SERPL-SCNC: 24 MMOL/L (ref 23–29)
CREAT SERPL-MCNC: 0.7 MG/DL (ref 0.5–1.4)
CV ECHO LV RWT: 0.48 CM
DIFFERENTIAL METHOD: ABNORMAL
DOP CALC AO PEAK VEL: 1.23 M/S
DOP CALC AO VTI: 25.89 CM
DOP CALC LVOT AREA: 3.5 CM2
DOP CALC LVOT DIAMETER: 2.12 CM
DOP CALC LVOT PEAK VEL: 0.94 M/S
DOP CALC LVOT STROKE VOLUME: 60.54 CM3
DOP CALCLVOT PEAK VEL VTI: 17.16 CM
E WAVE DECELERATION TIME: 203.97 MSEC
E/A RATIO: 0.8
ECHO LV POSTERIOR WALL: 1.06 CM (ref 0.6–1.1)
EOSINOPHIL # BLD AUTO: 0.1 K/UL (ref 0–0.5)
EOSINOPHIL NFR BLD: 1.1 % (ref 0–8)
ERYTHROCYTE [DISTWIDTH] IN BLOOD BY AUTOMATED COUNT: 13.6 % (ref 11.5–14.5)
EST. GFR  (AFRICAN AMERICAN): >60 ML/MIN/1.73 M^2
EST. GFR  (NON AFRICAN AMERICAN): >60 ML/MIN/1.73 M^2
FRACTIONAL SHORTENING: 29 % (ref 28–44)
GLUCOSE SERPL-MCNC: 92 MG/DL (ref 70–110)
HCT VFR BLD AUTO: 41.1 % (ref 37–48.5)
HGB BLD-MCNC: 12.6 G/DL (ref 12–16)
IMM GRANULOCYTES # BLD AUTO: 0.02 K/UL (ref 0–0.04)
IMM GRANULOCYTES NFR BLD AUTO: 0.3 % (ref 0–0.5)
INTERVENTRICULAR SEPTUM: 0.82 CM (ref 0.6–1.1)
IVRT: 0.11 MSEC
LA MAJOR: 3.95 CM
LA MINOR: 3.91 CM
LA WIDTH: 3.53 CM
LEFT ATRIUM SIZE: 2.93 CM
LEFT ATRIUM VOLUME INDEX: 20 ML/M2
LEFT ATRIUM VOLUME: 34.55 CM3
LEFT INTERNAL DIMENSION IN SYSTOLE: 3.12 CM (ref 2.1–4)
LEFT VENTRICLE DIASTOLIC VOLUME INDEX: 50.24 ML/M2
LEFT VENTRICLE DIASTOLIC VOLUME: 86.78 ML
LEFT VENTRICLE MASS INDEX: 78 G/M2
LEFT VENTRICLE SYSTOLIC VOLUME INDEX: 22.3 ML/M2
LEFT VENTRICLE SYSTOLIC VOLUME: 38.53 ML
LEFT VENTRICULAR INTERNAL DIMENSION IN DIASTOLE: 4.38 CM (ref 3.5–6)
LEFT VENTRICULAR MASS: 134.79 G
LYMPHOCYTES # BLD AUTO: 2.1 K/UL (ref 1–4.8)
LYMPHOCYTES NFR BLD: 28 % (ref 18–48)
MAGNESIUM SERPL-MCNC: 2 MG/DL (ref 1.6–2.6)
MCH RBC QN AUTO: 26.9 PG (ref 27–31)
MCHC RBC AUTO-ENTMCNC: 30.7 G/DL (ref 32–36)
MCV RBC AUTO: 88 FL (ref 82–98)
MONOCYTES # BLD AUTO: 0.7 K/UL (ref 0.3–1)
MONOCYTES NFR BLD: 8.7 % (ref 4–15)
MV PEAK A VEL: 0.92 M/S
MV PEAK E VEL: 0.74 M/S
NEUTROPHILS # BLD AUTO: 4.7 K/UL (ref 1.8–7.7)
NEUTROPHILS NFR BLD: 61.8 % (ref 38–73)
NRBC BLD-RTO: 0 /100 WBC
PISA TR MAX VEL: 1.92 M/S
PLATELET # BLD AUTO: 284 K/UL (ref 150–350)
PMV BLD AUTO: 10.2 FL (ref 9.2–12.9)
POTASSIUM SERPL-SCNC: 3.8 MMOL/L (ref 3.5–5.1)
PULM VEIN S/D RATIO: 1.6
PV PEAK D VEL: 0.4 M/S
PV PEAK S VEL: 0.64 M/S
PV PEAK VELOCITY: 0.7 CM/S
RA MAJOR: 3.57 CM
RA PRESSURE: 3 MMHG
RA WIDTH: 2.88 CM
RBC # BLD AUTO: 4.68 M/UL (ref 4–5.4)
RIGHT VENTRICULAR END-DIASTOLIC DIMENSION: 2.58 CM
RV TISSUE DOPPLER FREE WALL SYSTOLIC VELOCITY 1 (APICAL 4 CHAMBER VIEW): 12.79 CM/S
SINUS: 3.04 CM
SODIUM SERPL-SCNC: 141 MMOL/L (ref 136–145)
STJ: 2.61 CM
TR MAX PG: 15 MMHG
TRICUSPID ANNULAR PLANE SYSTOLIC EXCURSION: 2.34 CM
TV REST PULMONARY ARTERY PRESSURE: 18 MMHG
WBC # BLD AUTO: 7.51 K/UL (ref 3.9–12.7)

## 2020-02-11 PROCEDURE — 83735 ASSAY OF MAGNESIUM: CPT

## 2020-02-11 PROCEDURE — 25000003 PHARM REV CODE 250: Performed by: INTERNAL MEDICINE

## 2020-02-11 PROCEDURE — 36415 COLL VENOUS BLD VENIPUNCTURE: CPT

## 2020-02-11 PROCEDURE — 94761 N-INVAS EAR/PLS OXIMETRY MLT: CPT

## 2020-02-11 PROCEDURE — 99232 SBSQ HOSP IP/OBS MODERATE 35: CPT | Mod: ,,, | Performed by: PSYCHIATRY & NEUROLOGY

## 2020-02-11 PROCEDURE — 80048 BASIC METABOLIC PNL TOTAL CA: CPT

## 2020-02-11 PROCEDURE — 85025 COMPLETE CBC W/AUTO DIFF WBC: CPT

## 2020-02-11 PROCEDURE — 21400001 HC TELEMETRY ROOM

## 2020-02-11 PROCEDURE — 99232 PR SUBSEQUENT HOSPITAL CARE,LEVL II: ICD-10-PCS | Mod: ,,, | Performed by: PSYCHIATRY & NEUROLOGY

## 2020-02-11 PROCEDURE — 25000003 PHARM REV CODE 250: Performed by: HOSPITALIST

## 2020-02-11 RX ORDER — ASPIRIN 81 MG/1
81 TABLET ORAL DAILY
Status: DISCONTINUED | OUTPATIENT
Start: 2020-02-12 | End: 2020-02-12 | Stop reason: HOSPADM

## 2020-02-11 RX ORDER — TRAMADOL HYDROCHLORIDE 50 MG/1
50 TABLET ORAL EVERY 6 HOURS PRN
Status: DISCONTINUED | OUTPATIENT
Start: 2020-02-11 | End: 2020-02-12 | Stop reason: HOSPADM

## 2020-02-11 RX ADMIN — GABAPENTIN 300 MG: 300 CAPSULE ORAL at 02:02

## 2020-02-11 RX ADMIN — FOLIC ACID 1 MG: 1 TABLET ORAL at 08:02

## 2020-02-11 RX ADMIN — Medication 6 MG: at 08:02

## 2020-02-11 RX ADMIN — ATORVASTATIN CALCIUM 40 MG: 40 TABLET, FILM COATED ORAL at 08:02

## 2020-02-11 RX ADMIN — PANTOPRAZOLE SODIUM 40 MG: 40 TABLET, DELAYED RELEASE ORAL at 08:02

## 2020-02-11 RX ADMIN — DOCUSATE SODIUM - SENNOSIDES 1 TABLET: 50; 8.6 TABLET, FILM COATED ORAL at 08:02

## 2020-02-11 RX ADMIN — ACETAMINOPHEN 650 MG: 325 TABLET ORAL at 05:02

## 2020-02-11 RX ADMIN — Medication 6 MG: at 01:02

## 2020-02-11 RX ADMIN — GABAPENTIN 300 MG: 300 CAPSULE ORAL at 08:02

## 2020-02-11 RX ADMIN — ACETAMINOPHEN 650 MG: 325 TABLET ORAL at 01:02

## 2020-02-11 RX ADMIN — TRAMADOL HYDROCHLORIDE 50 MG: 50 TABLET ORAL at 08:02

## 2020-02-11 NOTE — NURSING
Assumed care of patient from WILLIAN Pratt. Patient lying in bed resting, NAD noted. Safety precautions maintained, will monitor.

## 2020-02-11 NOTE — ASSESSMENT & PLAN NOTE
Worsening left sided weakness/numbness likely due to recrudescence of old stroke symptoms vs TIA   MRI brain shows known right basal ganglia hemorrhage and old lacunar infarcts in left basal ganglia  Given old lacunar infarcts and possibility of TIA, discussed starting enteric coated aspirin 81mg daily with patient and she is in agreement with this plan  She states she never got hives or became short of breath on aspirin. She states it just caused her stomach to burn. Explained to her that aspirin is preferred over plavix and she is in agreement with trying enteric coated aspirin 81mg daily with food. Recommend starting first dose inhouse  Recommend starting statin  She was educated on the importance of good glucose, cholesterol and BP control for stroke prevention  She should follow up with neurology as an outpatient

## 2020-02-11 NOTE — SUBJECTIVE & OBJECTIVE
Subjective:     Interval History: No acute overnight events. Patient had an MRI brain which showed known right basal ganglia hemorrhage    Current Neurological Medications:     Current Facility-Administered Medications   Medication Dose Route Frequency Provider Last Rate Last Dose    acetaminophen tablet 650 mg  650 mg Oral Q4H PRN Zeke Quesada MD   650 mg at 02/11/20 1357    atorvastatin tablet 40 mg  40 mg Oral Daily Zeke Quesada MD   40 mg at 02/11/20 0857    folic acid tablet 1 mg  1 mg Oral Daily Zeke Quesada MD   1 mg at 02/11/20 0857    gabapentin capsule 300 mg  300 mg Oral TID Zeke Quesada MD   300 mg at 02/11/20 1416    melatonin tablet 6 mg  6 mg Oral Nightly PRN Zeke Quesada MD   6 mg at 02/11/20 0111    ondansetron injection 4 mg  4 mg Intravenous Q8H PRN Zeke Quesada MD        pantoprazole EC tablet 40 mg  40 mg Oral Daily Zeke Quesada MD   40 mg at 02/11/20 0857    senna-docusate 8.6-50 mg per tablet 1 tablet  1 tablet Oral BID Zeke Quesada MD   1 tablet at 02/11/20 0857    sodium chloride 0.9% flush 10 mL  10 mL Intravenous PRN Shira Boyd MD           Review of Systems - admits to left shoulder pain, denies bowel or bladder problems  Objective:     Vital Signs (Most Recent):  Temp: 98.3 °F (36.8 °C) (02/11/20 1125)  Pulse: 72 (02/11/20 1125)  Resp: 16 (02/11/20 1125)  BP: 115/77 (02/11/20 1125)  SpO2: 96 % (02/11/20 1125) Vital Signs (24h Range):  Temp:  [97.7 °F (36.5 °C)-98.3 °F (36.8 °C)] 98.3 °F (36.8 °C)  Pulse:  [72-80] 72  Resp:  [14-18] 16  SpO2:  [95 %-98 %] 96 %  BP: (109-137)/(71-82) 115/77     Weight: 73.8 kg (162 lb 11.2 oz)  Body mass index is 30.74 kg/m².    Physical Exam   Mental status: AAO x 3, no dysarthria, communicating appropriately  CN: PERRL, EOMI, VFF, V1-3 sensation intact,decreased nasolabial fold on left, tongue midline  Motor  RUE 5/5   LUE 4+/5 + drift  RLE 5/5  LLE 5/5     Sensory: intact to light touch  throughout  Reflexes: 3+ left biceps and 3+ left knee jerk, otherwise 2+ throughout  Gait: steady, can walk unassisted         Significant Labs:   Reviewed    Significant Imaging:   MRI brain shown known right basal ganglia hemorrhage from October 2019, old lacunar left basal ganglia infarcts

## 2020-02-11 NOTE — PROGRESS NOTES
[facility]  Neurology  Progress Note    Patient Name: Grazyna Durand  MRN: [id number]  Admission Date: 2/10/2020  Hospital Length of Stay: 1 days  Code Status: Full Code   Attending Provider: June Plummer MD  Primary Care Physician: Hugo Jurado MD   Principal Problem:Cerebrovascular accident (CVA)      Subjective:     Interval History: No acute overnight events. Patient had an MRI brain which showed known right basal ganglia hemorrhage    Current Neurological Medications:     Current Facility-Administered Medications   Medication Dose Route Frequency Provider Last Rate Last Dose    acetaminophen tablet 650 mg  650 mg Oral Q4H PRN Zeke Quesada MD   650 mg at 02/11/20 1357    atorvastatin tablet 40 mg  40 mg Oral Daily Zeke Quesada MD   40 mg at 02/11/20 0857    folic acid tablet 1 mg  1 mg Oral Daily Zeke Quesada MD   1 mg at 02/11/20 0857    gabapentin capsule 300 mg  300 mg Oral TID Zeke Quesada MD   300 mg at 02/11/20 1416    melatonin tablet 6 mg  6 mg Oral Nightly PRN Zeke Quesada MD   6 mg at 02/11/20 0111    ondansetron injection 4 mg  4 mg Intravenous Q8H PRN Zeke Quesada MD        pantoprazole EC tablet 40 mg  40 mg Oral Daily Zeke Quesada MD   40 mg at 02/11/20 0857    senna-docusate 8.6-50 mg per tablet 1 tablet  1 tablet Oral BID Zeke Quesada MD   1 tablet at 02/11/20 0857    sodium chloride 0.9% flush 10 mL  10 mL Intravenous PRN Shira Boyd MD           Review of Systems - admits to left shoulder pain, denies bowel or bladder problems  Objective:     Vital Signs (Most Recent):  Temp: 98.3 °F (36.8 °C) (02/11/20 1125)  Pulse: 72 (02/11/20 1125)  Resp: 16 (02/11/20 1125)  BP: 115/77 (02/11/20 1125)  SpO2: 96 % (02/11/20 1125) Vital Signs (24h Range):  Temp:  [97.7 °F (36.5 °C)-98.3 °F (36.8 °C)] 98.3 °F (36.8 °C)  Pulse:  [72-80] 72  Resp:  [14-18] 16  SpO2:  [95 %-98 %] 96 %  BP: (109-137)/(71-82) 115/77     Weight: 73.8 kg  (162 lb 11.2 oz)  Body mass index is 30.74 kg/m².    Physical Exam   Mental status: AAO x 3, no dysarthria, communicating appropriately  CN: PERRL, EOMI, VFF, V1-3 sensation intact,decreased nasolabial fold on left, tongue midline  Motor  RUE 5/5   LUE 4+/5 + drift  RLE 5/5  LLE 5/5     Sensory: intact to light touch throughout  Reflexes: 3+ left biceps and 3+ left knee jerk, otherwise 2+ throughout  Gait: steady, can walk unassisted         Significant Labs:   Reviewed    Significant Imaging:   MRI brain shown known right basal ganglia hemorrhage from October 2019, old lacunar left basal ganglia infarcts    Assessment and Plan:     Left shoulder pain  Recommend further eval of left shoulder pain, agree with gabapentin    Left sided numbness  Worsening left sided weakness/numbness likely due to recrudescence of old stroke symptoms vs TIA   MRI brain shows known right basal ganglia hemorrhage and old lacunar infarcts in left basal ganglia  Given old lacunar infarcts and possibility of TIA, discussed starting enteric coated aspirin 81mg daily with patient and she is in agreement with this plan  She states she never got hives or became short of breath on aspirin. She states it just caused her stomach to burn. Explained to her that aspirin is preferred over plavix and she is in agreement with trying enteric coated aspirin 81mg daily with food. Recommend starting first dose inhouse  Recommend starting statin  She was educated on the importance of good glucose, cholesterol and BP control for stroke prevention  She should follow up with neurology as an outpatient        VTE Risk Mitigation (From admission, onward)         Ordered     Place sequential compression device  Until discontinued      02/11/20 0819     IP VTE LOW RISK PATIENT  Once      02/10/20 1339     Place BC hose  Until discontinued      02/10/20 1339                Rylie Dee DO  Neurology  [facility]

## 2020-02-11 NOTE — PROGRESS NOTES
Received report from WILLIAN Callaway. Patient AAOx4, resting quietly in bed, telemetry monitoring in place, no distress noted. Safety maintained, call light in reach.

## 2020-02-12 VITALS
SYSTOLIC BLOOD PRESSURE: 135 MMHG | RESPIRATION RATE: 18 BRPM | OXYGEN SATURATION: 98 % | WEIGHT: 156.75 LBS | TEMPERATURE: 98 F | BODY MASS INDEX: 29.59 KG/M2 | DIASTOLIC BLOOD PRESSURE: 80 MMHG | HEIGHT: 61 IN | HEART RATE: 83 BPM

## 2020-02-12 LAB
POCT GLUCOSE: 109 MG/DL (ref 70–110)
POCT GLUCOSE: 77 MG/DL (ref 70–110)
POCT GLUCOSE: 88 MG/DL (ref 70–110)

## 2020-02-12 PROCEDURE — 25000003 PHARM REV CODE 250: Performed by: HOSPITALIST

## 2020-02-12 PROCEDURE — 25000003 PHARM REV CODE 250: Performed by: INTERNAL MEDICINE

## 2020-02-12 RX ORDER — ASPIRIN 81 MG/1
81 TABLET ORAL DAILY
Qty: 90 TABLET | Refills: 3 | OUTPATIENT
Start: 2020-02-13 | End: 2020-09-12

## 2020-02-12 RX ORDER — ATORVASTATIN CALCIUM 40 MG/1
40 TABLET, FILM COATED ORAL DAILY
Qty: 90 TABLET | Refills: 3 | Status: SHIPPED | OUTPATIENT
Start: 2020-02-12 | End: 2020-07-30 | Stop reason: SDUPTHER

## 2020-02-12 RX ORDER — TRAMADOL HYDROCHLORIDE 50 MG/1
50 TABLET ORAL EVERY 6 HOURS PRN
Qty: 20 TABLET | Refills: 0 | OUTPATIENT
Start: 2020-02-12 | End: 2020-09-12

## 2020-02-12 RX ADMIN — FOLIC ACID 1 MG: 1 TABLET ORAL at 08:02

## 2020-02-12 RX ADMIN — ASPIRIN 81 MG: 81 TABLET, COATED ORAL at 08:02

## 2020-02-12 RX ADMIN — GABAPENTIN 300 MG: 300 CAPSULE ORAL at 08:02

## 2020-02-12 RX ADMIN — DOCUSATE SODIUM - SENNOSIDES 1 TABLET: 50; 8.6 TABLET, FILM COATED ORAL at 08:02

## 2020-02-12 RX ADMIN — TRAMADOL HYDROCHLORIDE 50 MG: 50 TABLET ORAL at 05:02

## 2020-02-12 RX ADMIN — PANTOPRAZOLE SODIUM 40 MG: 40 TABLET, DELAYED RELEASE ORAL at 08:02

## 2020-02-12 RX ADMIN — ATORVASTATIN CALCIUM 40 MG: 40 TABLET, FILM COATED ORAL at 08:02

## 2020-02-12 RX ADMIN — TRAMADOL HYDROCHLORIDE 50 MG: 50 TABLET ORAL at 01:02

## 2020-02-12 NOTE — NURSING
Report given to WILLIAN Paris. Pt resting in bed awake. NADN. Respirations even and unlabored. Requested assistance up to toilet. Assisted by staff x1. Call light in reach.

## 2020-02-12 NOTE — PLAN OF CARE
Problem: Fall Injury Risk  Goal: Absence of Fall and Fall-Related Injury  Outcome: Ongoing, Progressing     Problem: Adult Inpatient Plan of Care  Goal: Plan of Care Review  Outcome: Ongoing, Progressing  Goal: Patient-Specific Goal (Individualization)  Outcome: Ongoing, Progressing  Goal: Absence of Hospital-Acquired Illness or Injury  Outcome: Ongoing, Progressing  Goal: Optimal Comfort and Wellbeing  Outcome: Ongoing, Progressing  Goal: Readiness for Transition of Care  Outcome: Ongoing, Progressing  Goal: Rounds/Family Conference  Outcome: Ongoing, Progressing     Problem: Balance Impairment (Functional Deficit)  Goal: Improved Balance and Postural Control  Outcome: Ongoing, Progressing     Problem: Muscle Strength Impairment  Goal: Improved Muscle Strength  Outcome: Ongoing, Progressing     Problem: Range of Motion Impairment (Functional Deficit)  Goal: Optimal Range of Motion  Outcome: Ongoing, Progressing

## 2020-02-12 NOTE — NURSING
Bedside report received. Pt sitting up in bed watching tv. A/O. NADN. Respirations even and unlabored. Bed alarm activated. Call light in reach.

## 2020-02-12 NOTE — PROGRESS NOTES
WRITTEN HEALTHCARE DISCHARGE INFORMATION      Things that YOU are RESPONSIBLE for to Manage Your Care At Home:     1. Getting your prescriptions filled.  2. Taking you medications as directed. DO NOT MISS ANY DOSES!  3. Going to your follow-up doctor appointments. This is important because it allows the doctor to monitor your progress and to determine if any changes need to be made to your treatment plan.     If you are unable to make your follow up appointments, please call the number listed and reschedule this appointment.      ____________HELP AT HOME____________________     Experiencing any SIGNS or SYMPTOMS: YOU CAN     Schedule a same day appopintment with your Primary Care Doctor or  you can call [facility] for 24/7 assistance at [phone]     If you are experience any signs or symptoms that have become severe, Call 911 and come to your nearest Emergency Room.     Thank you for choosing [facility] and allowing us to care for you.   From your care management team:      You should receive a call from [facility] within 48-72 hours to help manage your care after discharge. Please try to make sure that you answer your phone for this important phone call.      Follow-up Information     Hugo Jurado MD On 3/3/2020.    Specialty:  Family Medicine  Why:  Appointment scheduled for March 3rd at 11am  Contact information:  [address]  [place] [address]  [place] [address]  [phone]             Rylie Dee DO On 5/22/2020.    Specialty:  Neurology  Why:  Appointment scheduled for May 22nd at 3pm  Contact information:  [address] [facility]  [address]  [facility] [address]  [phone]

## 2020-02-12 NOTE — SUBJECTIVE & OBJECTIVE
Interval History: pt c/o HA and right shoulder pain, anxious and tearful    Review of Systems   Constitutional: Positive for appetite change. Negative for diaphoresis, fatigue and fever.   HENT: Negative for congestion.    Eyes: Negative for pain.   Respiratory: Negative for cough, chest tightness and shortness of breath.    Cardiovascular: Negative for chest pain, palpitations and leg swelling.   Gastrointestinal: Negative for abdominal pain, diarrhea, nausea and vomiting.   Genitourinary: Negative for difficulty urinating.   Musculoskeletal: Positive for arthralgias.   Skin: Negative for color change.   Neurological: Positive for weakness. Negative for dizziness, tremors and seizures.   Psychiatric/Behavioral: Negative for agitation.     Objective:     Vital Signs (Most Recent):  Temp: 98.1 °F (36.7 °C) (02/12/20 0802)  Pulse: (!) 58 (02/12/20 0802)  Resp: 20 (02/12/20 0802)  BP: 110/79 (02/12/20 0802)  SpO2: 95 % (02/12/20 0802) Vital Signs (24h Range):  Temp:  [97.8 °F (36.6 °C)-98.3 °F (36.8 °C)] 98.1 °F (36.7 °C)  Pulse:  [58-88] 58  Resp:  [16-20] 20  SpO2:  [95 %-99 %] 95 %  BP: (105-135)/(74-92) 110/79     Weight: 71.1 kg (156 lb 12 oz)  Body mass index is 29.62 kg/m².    Intake/Output Summary (Last 24 hours) at 2/12/2020 0941  Last data filed at 2/12/2020 0000  Gross per 24 hour   Intake 240 ml   Output --   Net 240 ml      Physical Exam   Constitutional: She is oriented to person, place, and time. She appears well-developed and well-nourished.   HENT:   Head: Normocephalic and atraumatic.   Nose: Nose normal.   Mouth/Throat: Oropharynx is clear and moist.   Eyes: Pupils are equal, round, and reactive to light. Conjunctivae and EOM are normal. No scleral icterus.   Neck: Normal range of motion. Neck supple. No JVD present. No tracheal deviation present. No thyromegaly present.   Cardiovascular: Normal rate, regular rhythm, normal heart sounds and intact distal pulses.   No murmur heard.  Pulmonary/Chest:  Effort normal and breath sounds normal. No stridor. No respiratory distress. She has no wheezes.   Abdominal: Soft. Bowel sounds are normal. There is no tenderness.   Musculoskeletal: Normal range of motion. She exhibits no edema or deformity.   Neurological: She is alert and oriented to person, place, and time. No cranial nerve deficit.   Left-sided weakness with left pronator drift   Skin: Skin is warm and dry. She is not diaphoretic. No erythema.   Psychiatric: She has a normal mood and affect.       Significant Labs: All pertinent labs within the past 24 hours have been reviewed.    Significant Imaging: I have reviewed and interpreted all pertinent imaging results/findings within the past 24 hours.

## 2020-02-12 NOTE — PROGRESS NOTES
Discharge Instructions for Stroke  You have been diagnosed with a stroke, or with a TIA (transient ischemic attack). Or you have been identified as having a high risk for stroke. During a stroke, blood stops flowing to part of your brain. This can damage areas in the brain that control other parts of the body. Symptoms after a stroke depend on which part of the brain has been affected.  Stroke risk factors  Once youve had a stroke, youre at greater risk for another one. Listed below are some other factors that can increase your risk for a stroke:  · High blood pressure  · High cholesterol  · Cigarette or cigar smoking  · Diabetes  · Carotid or other artery disease  · Atrial fibrillation, atrial flutter, or other heart disease  · Not being physically active  · Obesity  · Certain blood disorders (such as sickle cell anemia)  · Excessive alcohol use  · Abuse of street drugs  · Race  · Gender  · Family history of stroke  · Diet high in salty, fried, or greasy foods  Changes in daily living  Doing your regular tasks may be difficult after youve had a stroke, but you can learn new ways to manage your daily activities. In fact, doing daily activities may help you to regain muscle strength and bring back function to affected limbs. Be patient, give yourself time to adjust, and appreciate the progress you make.  Daily activities  You may be at risk of falling. Make changes to your home to help you walk more easily. A therapist will decide if you need an assistive device to walk safely.  You may need to see an occupational therapist or physical therapist to learn new ways of doing things. For example, you may need to make adjustments when bathing or dressing:  Tips for showering or bathing  · Test the water temperature with a hand or foot that was not affected by the stroke.  · Use grab bars, a shower seat, a hand-held showerhead, and a long-handled brush.  Tips for getting dressed  · Dress while sitting, starting with the  affected side or limb.  · Wear shirts that pull easily over your head. Wear pants or skirts with elastic waistbands.  · Use zippers with loops attached to the pull tabs.  Lifestyle changes  · Take your medicines exactly as directed. Dont skip doses.  · Begin an exercise program. Ask your provider how to get started. Also ask how much activity you should try to get on a daily or weekly basis. You can benefit from simple activities such as walking or gardening.  · Limit alcohol intake. Men should have no more than 2 alcoholic drinks a day. Women should limit themselves to 1 alcoholic drink per day.  · Know your cholesterol level. Follow your providers recommendations about how to keep cholesterol under control.  · If you are a smoker, quit now. Join a stop-smoking program to improve your chances of success. Ask your provider about medicines or other methods to help you quit.  · Learn stress management techniques to help you deal with stress in your home and work life.  Diet  Your healthcare provider will give you information on dietary changes that you may need to make, based on your situation. Your provider may recommend that you see a registered dietitian for help with diet changes. Changes may include:  · Reducing fat and cholesterol intake  · Reducing salt (sodium) intake, especially if you have high blood pressure  · Eating more fresh vegetables and fruits  · Eating more lean proteins, such as fish, poultry, and beans and peas (legumes)  · Eating less red meat and processed meats  · Using low-fat dairy products  · Limiting vegetable oils and nut oils  · Limiting sweets and processed foods such as chips, cookies, and baked goods  Follow-up care  · Keep your medical appointments. Close follow-up is important to stroke rehabilitation and recovery.  · Some medicines require blood tests to check for progress or problems. Keep follow-up appointments for any blood tests ordered by your providers.  When to call  911  Call 911 right away if you have any of the following symptoms of stroke:  · Weakness, tingling, or loss of feeling on one side of your face or body  · Sudden double vision or trouble seeing in one or both eyes  · Sudden trouble talking or slurred speech  · Trouble understanding others  · Sudden, severe headache  · Dizziness, loss of balance, or a sense of falling  · Blackouts or seizures      F.A.S.T. is an easy way to remember the signs of stroke. When you see these signs, you know that you need to call 911 fast.  F.A.S.T. stands for:  · F is for face drooping. One side of the face is drooping or numb. When the person smiles, the smile is uneven.  · A is for arm weakness. One arm is weak or numb. When the person lifts both arms at the same time, one arm may drift downward.  · S is for speech difficulty. You may notice slurred speech or trouble speaking. The person can't repeat a simple sentence correctly when asked.  · T is for time to call 911. If someone shows any of these symptoms, even if they go away, call 911 immediately. Make note of the time the symptoms first appeared.

## 2020-02-12 NOTE — PROGRESS NOTES
"[facility]  [facility]    Patient Name: Grazyna Durand  MRN: [id number]  Patient Class: IP- Inpatient   Admission Date: 2/10/2020  Length of Stay: 2 days  Attending Physician: June Plummer MD  Primary Care Provider: Hugo Jurado MD        Subjective:     Principal Problem:Cerebrovascular accident (CVA)        HPI:  This is a 64 y.o. female with a PMHx of HLD, HTN, and Stroke. She presents to the Emergency Department with a cc of left-sided weakness which began approximately 6 hours PTA. The patient reports associated slurred speech. There were no alleviating or worsening factors reported; no attempt at treatment. Patient reports a prior history of similar symptoms 4 months ago secondary to a stroke. After the previous stroke, the patient states she was left with chronic numbness but her speech and gait were unaffected. She has been compliant with all medication.    Patient was admitted to [facility] with a Hemorrhagic Stroke in 10/2019 - she presented with similar symptoms of left sided weakness.  She was discharged to home and went to stay with family in [place].  No formal Physical Therapy, but states her "family got me stronger".  She had a residual left shoulder pain and LUE weakness.  Patient presented this episode with feeling of "heavy speech" similar to her previous stroke presentation.  She has recurrent left-sided weakness.  No headache, no change in vision.  No chest pain or SOB.  No abdominal pain or dysuria.     Overview/Hospital Course:  Pt admitted with multiple complaints - left sided weakness, HA and right shoulder pain.   MRI:  Impression:       Complex region in the right basal ganglia/subinsular cortex with blood fluid levels and small component of diffusion restriction.  The findings are most consistent with subacute hemorrhagic infarction in the right basal ganglia.     No new CVA findings. Possibly TIA events with presenting symptoms. Resumed " gabapentin and tramadol or pain. ECHO pending. Consider dc home tomorrow.       Interval History: pt c/o HA and right shoulder pain, anxious and tearful    Review of Systems   Constitutional: Positive for appetite change. Negative for diaphoresis, fatigue and fever.   HENT: Negative for congestion.    Eyes: Negative for pain.   Respiratory: Negative for cough, chest tightness and shortness of breath.    Cardiovascular: Negative for chest pain, palpitations and leg swelling.   Gastrointestinal: Negative for abdominal pain, diarrhea, nausea and vomiting.   Genitourinary: Negative for difficulty urinating.   Musculoskeletal: Positive for arthralgias.   Skin: Negative for color change.   Neurological: Positive for weakness. Negative for dizziness, tremors and seizures.   Psychiatric/Behavioral: Negative for agitation.     Objective:     Vital Signs (Most Recent):  Temp: 98.1 °F (36.7 °C) (02/12/20 0802)  Pulse: (!) 58 (02/12/20 0802)  Resp: 20 (02/12/20 0802)  BP: 110/79 (02/12/20 0802)  SpO2: 95 % (02/12/20 0802) Vital Signs (24h Range):  Temp:  [97.8 °F (36.6 °C)-98.3 °F (36.8 °C)] 98.1 °F (36.7 °C)  Pulse:  [58-88] 58  Resp:  [16-20] 20  SpO2:  [95 %-99 %] 95 %  BP: (105-135)/(74-92) 110/79     Weight: 71.1 kg (156 lb 12 oz)  Body mass index is 29.62 kg/m².    Intake/Output Summary (Last 24 hours) at 2/12/2020 0941  Last data filed at 2/12/2020 0000  Gross per 24 hour   Intake 240 ml   Output --   Net 240 ml      Physical Exam   Constitutional: She is oriented to person, place, and time. She appears well-developed and well-nourished.   HENT:   Head: Normocephalic and atraumatic.   Nose: Nose normal.   Mouth/Throat: Oropharynx is clear and moist.   Eyes: Pupils are equal, round, and reactive to light. Conjunctivae and EOM are normal. No scleral icterus.   Neck: Normal range of motion. Neck supple. No JVD present. No tracheal deviation present. No thyromegaly present.   Cardiovascular: Normal rate, regular rhythm,  "normal heart sounds and intact distal pulses.   No murmur heard.  Pulmonary/Chest: Effort normal and breath sounds normal. No stridor. No respiratory distress. She has no wheezes.   Abdominal: Soft. Bowel sounds are normal. There is no tenderness.   Musculoskeletal: Normal range of motion. She exhibits no edema or deformity.   Neurological: She is alert and oriented to person, place, and time. No cranial nerve deficit.   Left-sided weakness with left pronator drift   Skin: Skin is warm and dry. She is not diaphoretic. No erythema.   Psychiatric: She has a normal mood and affect.       Significant Labs: All pertinent labs within the past 24 hours have been reviewed.    Significant Imaging: I have reviewed and interpreted all pertinent imaging results/findings within the past 24 hours.      Assessment/Plan:      * Cerebrovascular accident (CVA)    Pt agreed to take aspirin EC - previously refused due to "allergy" - not true allergy  Resume statin   ECHo pending        -Neuro - CVA - Left-sided Weakness and Slurred Speech.  Unclear if these are residual symptoms from previous stroke.  Her exam with LUE weakness and left hand numbness, but limited by pain in her shoulder which is chronic.  Does has a left pronator drift.  LEs with good motor function.       Evaluated by Vascular Surgery - Left sided numbness   63 y/o woman with prior stroke (R-BG hemorrhage) presenting with mild L sided weakness/numbness.  Suspect recrudescence of prior stroke symptoms, but recommend MRI brain to r/o new infarct.   Alteplase Eligible?: No   Alteplase Recommendation: Alteplase not recommended due to Outside of treatment window  and Suspected stroke mimic    Possible Interventional Revascularization Candidate? No; No significant neurological deficit   Disposition Recommendation: admit to inpatient.      CTH Without:  New remote appearing bilateral hemisphere changes suggestive possible chronic microvascular occlusive disease change of " remote nature.  No acute process confirmed.  Depending on clinical information consideration for immediately or delayed outpatient MRI brain suggested as indicated.      CTA H/N with - 1. No acute/emergent abnormality appreciated.  No high-grade stenosis or major vessel occlusion.   2. Symmetric mild chronic microangiopathic ischemic white matter changes.   3. Right greater than left bilateral basal ganglia remote lacunar infarcts.      MRI B ordered and Neurology Consulted.      Patient admitted to [facility] in 10/2019 with Right basal ganglia hemorrhagic infarct with residual left-sided weakness and slurred speech and facial droop.   MRI in 10/2019 - Intraparenchymal hemorrhage centered along the posterior aspect of the right basal ganglia, similar to 10/12/2019.  No associated abnormal enhancing mass to suggest underlying neoplasm.  Small focus of acute/subacute ischemia in the subcortical white matter in the right parietal lobe posterior to the afore mentioned intraparenchymal hemorrhage.       Evidence of mild patchy bilateral chronic white matter ischemia.       Mild generalized cerebral atrophy.        Left shoulder pain  Appears chronic - neuropathy  Resume gabapentin and tramadol       Left sided numbness  Likely TIA/exacerbated symptoms from previous stroke   PT/OT        Hyperlipidemia  Statin       Gastroesophageal reflux disease without esophagitis  PPI daily       Essential hypertension        -Cards - HTN. HLD.  /84 on admission.  Will allow for permissive HTN for now.  On Amlodipine as outpatient.  On Atorvastatin.      NM Stress in 3/2016 - Impression: NORMAL MYOCARDIAL PERFUSION   1. The perfusion scan is free of evidence for myocardial ischemia or injury.    2. Resting wall motion is physiologic.    3. Visually estimated LV function is normal.    4. The ventricular volumes are normal at rest and stress.    5. The extracardiac distribution of radioactivity is normal.      TTE in 3/2016 -  CONCLUSIONS      1 - Normal left ventricular systolic function (EF 55-60%).  Normal wall motion.      2 - Concentric remodeling.      3 - Trivial mitral regurgitation.      4 - Trivial tricuspid regurgitation.      5 - The estimated PA systolic pressure is 28 mmHg.              VTE Risk Mitigation (From admission, onward)         Ordered     Place sequential compression device  Until discontinued      02/11/20 0819     IP VTE LOW RISK PATIENT  Once      02/10/20 1339     Place BC hose  Until discontinued      02/10/20 1339                      June Plummer MD  Department of Hospital Medicine   [facility]

## 2020-02-12 NOTE — NURSING
Discharged instructions printed and explained to patient who voiced understanding. Prescriptions also given. Discharged to home with family

## 2020-02-12 NOTE — PLAN OF CARE
02/11/20 1507   Discharge Assessment   Assessment Type Discharge Planning Assessment   Assessment information obtained from? Medical Record   Prior to hospitilization cognitive status: Alert/Oriented   Prior to hospitalization functional status: Independent;Assistive Equipment   Current cognitive status: Alert/Oriented   Current Functional Status: Independent;Assistive Equipment   Facility Arrived From: home   Lives With other (see comments)   Able to Return to Prior Arrangements yes   Is patient able to care for self after discharge? Yes   Who are your caregiver(s) and their phone number(s)? Sonja [phone]   Patient's perception of discharge disposition home or selfcare  (OP PT/OT)   Readmission Within the Last 30 Days no previous admission in last 30 days   Patient currently being followed by outpatient case management? No   Patient currently receives any other outside agency services? No   Equipment Currently Used at Home none   Do you have any problems affording any of your prescribed medications? No   Is the patient taking medications as prescribed? yes   Does the patient have transportation home? Yes   Transportation Anticipated family or friend will provide   Does the patient receive services at the [facility]? No   Discharge Plan A Home with family  (with follow up )   DME Needed Upon Discharge  none   Patient/Family in Agreement with Plan yes     [employer] [employer] [facility] [place], [place] - [address]  [address] [address]  Phone: [phone] Fax: [phone]

## 2020-02-14 ENCOUNTER — PATIENT OUTREACH (OUTPATIENT)
Dept: ADMINISTRATIVE | Facility: CLINIC | Age: 65
End: 2020-02-14

## 2020-02-14 DIAGNOSIS — I63.9 CEREBROVASCULAR ACCIDENT (CVA), UNSPECIFIED MECHANISM: Primary | ICD-10-CM

## 2020-02-14 NOTE — PROGRESS NOTES
C3 nurse attempted to contact patient. No answer.  C3 nurse attempted to contact Grazyna for a TCC post hospital discharge follow up call. No answer at phone number listed and no voicemail available. The patient does not have a scheduled HOSFU appointment within 7-14 days post hospital discharge date 2/12/20. [facility].

## 2020-02-17 RX ORDER — OMEPRAZOLE 10 MG/1
CAPSULE, DELAYED RELEASE ORAL DAILY
COMMUNITY
End: 2020-03-24

## 2020-02-17 NOTE — PATIENT INSTRUCTIONS
Stroke (Completed)    You have had a mild stroke, or cerebrovascular accident (CVA). This is caused by a loss of blood flow to part of your brain. This can occur when a blood clot forms inside the carotid artery (main artery from the heart to the brain) or inside the heart. When the clot travels to the brain, it can lodge in a blood vessel and block blood flow. The other common cause of stroke is a gradual narrowing of the arteries in the brain due to buildup of fatty deposits (plaque).  Symptoms  Blocked blood flow in different areas of the brain can cause different symptoms. If you have had a stroke before, a new one may be different. A memory aid for the basic signs of a stroke is F.A.S.T.  F.A.S.T.  · F: Face drooping, or numbness on one side. This may be more noticeable when you ask the affected person to smile.  · A: Arm weakness or numbness. The affected person may have trouble using or lifting one side.  · S: Speech difficulty. Speech may be slurred or hard to understand. The affected person may also use the wrong words.  · T: Time to call 911. Time is critical in treating a stroke. Call 911 as soon as you suspect a stroke has happened--even a small one. The sooner treatment is started the better, even if the symptoms go away.  Other common symptoms of a stroke include:  · Having difficulty getting the right words to come out  · Weakness in one leg  · Numbness on one side  · Difficulty walking  · Trouble with coordination  · Trouble with vision  · Headache  · Confusion  · Dizziness  Treatment  After you have had a stroke, you are at risk of having another. Be sure to follow up with your healthcare provider for further evaluation and treatment. If problems are found, your healthcare provider will recommend treatment with medicines and/or procedures.  To reduce your chance of having another stroke, you may be prescribed medicines. These include medicines to prevent blood clots, such as antiplatelet or  anticoagulant medicines.  Home care  · Rest at home and avoid exertion for the next few days.  · If your healthcare provider has prescribed medicines, take them as directed.  Follow-up care  Follow up with your healthcare provider, or as advised. Additional tests may be needed. If you had an X-ray, CT scan, MRI, or ECG (electrocardiogram), it will be reviewed by a specialist. You will be notified of any new findings that will affect your care.  Call 911  Contact emergency services right away if any of these occur:  · Any of your stroke symptoms worsen  · New problems with speech, confusion, vision, walking, coordination, facial droop, or weakness or numbness on one side of your body  · Severe headache, fainting spell, dizziness, or seizure  · Chest pain or shortness of breath  Remember F.A.S.T. (described above). If you notice warning signs and symptoms of stroke, CALL 911 without delay.  Date Last Reviewed: 9/21/2015  © 5154-8714 [employer]. [address] [place] [address], [place] [address]. All rights reserved. This information is not intended as a substitute for professional medical care. Always follow your healthcare professional's instructions.

## 2020-02-25 NOTE — DISCHARGE SUMMARY
"[facility] [place] Medicine  Discharge Summary      Patient Name: Grazyna Durand  MRN: [id number]  Admission Date: 2/10/2020  Hospital Length of Stay: 2 days  Discharge Date and Time: 2/12/2020  Attending Physician: No att. providers found   Discharging Provider: June Plummer MD  Primary Care Provider: Hugo Jurado MD      HPI:   This is a 64 y.o. female with a PMHx of HLD, HTN, and Stroke. She presents to the Emergency Department with a cc of left-sided weakness which began approximately 6 hours PTA. The patient reports associated slurred speech. There were no alleviating or worsening factors reported; no attempt at treatment. Patient reports a prior history of similar symptoms 4 months ago secondary to a stroke. After the previous stroke, the patient states she was left with chronic numbness but her speech and gait were unaffected. She has been compliant with all medication.    Patient was admitted to [facility] with a Hemorrhagic Stroke in 10/2019 - she presented with similar symptoms of left sided weakness.  She was discharged to home and went to stay with family in [place].  No formal Physical Therapy, but states her "family got me stronger".  She had a residual left shoulder pain and LUE weakness.  Patient presented this episode with feeling of "heavy speech" similar to her previous stroke presentation.  She has recurrent left-sided weakness.  No headache, no change in vision.  No chest pain or SOB.  No abdominal pain or dysuria.     * No surgery found *      Hospital Course:   Pt admitted with multiple complaints - left sided weakness, HA and right shoulder pain.   MRI:  Impression:       Complex region in the right basal ganglia/subinsular cortex with blood fluid levels and small component of diffusion restriction.  The findings are most consistent with subacute hemorrhagic infarction in the right basal ganglia.     No new CVA findings. Possibly TIA events with presenting symptoms. " Resumed gabapentin and tramadol or pain. ECHO pending. Consider dc home tomorrow.     Conclusion     · Normal left ventricular systolic function. The estimated ejection fraction is 65%.  · Normal LV diastolic function.  · Normal right ventricular systolic function.  · Moderate left atrial enlargement.  · Mild mitral regurgitation.  · The estimated PA systolic pressure is 18 mmHg.  · Normal central venous pressure (3 mmHg).     PT will dc home - now agrees to take aspirin as these symptoms likely TIA. Follow up PCP and neurology as scheduled. Counseled on stroke prevention with compliancy of medications and diet/exercise.  Continue outpatient PT. Resume gabapentin and PRN tramadol for pain.        Consults:   Consults (From admission, onward)        Status Ordering Provider     Inpatient consult to Neurology  Once     Provider:  DO Iman Handley MICELLE J.          No new Assessment & Plan notes have been filed under this hospital service since the last note was generated.  Service: Hospital Medicine    Final Active Diagnoses:    Diagnosis Date Noted POA    PRINCIPAL PROBLEM:  Cerebrovascular accident (CVA) [I63.9] 02/10/2020 Yes    Left shoulder pain [M25.512] 02/11/2020 Yes    Left sided numbness [R20.0] 02/10/2020 Yes    Hyperlipidemia [E78.5] 04/08/2016 Yes    Gastroesophageal reflux disease without esophagitis [K21.9] 03/31/2016 Yes    Essential hypertension [I10] 03/29/2016 Yes      Problems Resolved During this Admission:       Discharged Condition: good    Disposition: Home or Self Care    Follow Up:  Follow-up Information     Hugo Jurado MD On 3/3/2020.    Specialty:  Family Medicine  Why:  Appointment scheduled for March 3rd at 11am  Contact information:  [address]  [place]  [place] [address]  [phone]             Rylie Dee DO On 5/22/2020.    Specialty:  Neurology  Why:  Appointment scheduled for May 22nd at 3pm  Contact information:  [address]  [address]  [place] [address]  [phone]                 Patient Instructions:      Diet Cardiac     Notify your health care provider if you experience any of the following:  severe uncontrolled pain     Notify your health care provider if you experience any of the following:  persistent dizziness, light-headedness, or visual disturbances     Activity as tolerated       Significant Diagnostic Studies: see above    Pending Diagnostic Studies:     None         Medications:  Reconciled Home Medications:      Medication List      START taking these medications    aspirin 81 MG EC tablet  Commonly known as:  ECOTRIN  Take 1 tablet (81 mg total) by mouth once daily.     traMADol 50 mg tablet  Commonly known as:  ULTRAM  Take 1 tablet (50 mg total) by mouth every 6 (six) hours as needed.        CONTINUE taking these medications    atorvastatin 40 MG tablet  Commonly known as:  LIPITOR  Take 1 tablet (40 mg total) by mouth once daily.     folic acid 1 MG tablet  Commonly known as:  FOLVITE  Take 1 mg by mouth once daily.     Neurontin 300 MG capsule  Generic drug:  gabapentin  Take 300 mg by mouth 3 (three) times daily.     pantoprazole 40 MG tablet  Commonly known as:  PROTONIX  Take 1 tablet (40 mg total) by mouth once daily.        STOP taking these medications    amLODIPine 10 MG tablet  Commonly known as:  NORVASC     hydroCHLOROthiazide 12.5 MG Tab  Commonly known as:  HYDRODIURIL     HYDROcodone-acetaminophen 5-325 mg per tablet  Commonly known as:  NORCO     LORazepam 1 MG tablet  Commonly known as:  ATIVAN     LOSARTAN ORAL     omeprazole 20 MG capsule  Commonly known as:  PRILOSEC     promethazine-dextromethorphan 6.25-15 mg/5 mL Syrp  Commonly known as:  PROMETHAZINE-DM            Indwelling Lines/Drains at time of discharge:   Lines/Drains/Airways     None                 Time spent on the discharge of patient: 40 minutes  Patient was seen and examined on the date of discharge and determined to be  suitable for discharge.         June Plummer MD  Department of Hospital Medicine  [facility]

## 2020-03-06 ENCOUNTER — CARE AT HOME (OUTPATIENT)
Dept: HOME HEALTH SERVICES | Facility: CLINIC | Age: 65
End: 2020-03-06
Payer: MEDICAID

## 2020-03-06 DIAGNOSIS — I63.9 CEREBROVASCULAR ACCIDENT (CVA), UNSPECIFIED MECHANISM: ICD-10-CM

## 2020-03-06 DIAGNOSIS — I10 ESSENTIAL HYPERTENSION: Primary | ICD-10-CM

## 2020-03-06 DIAGNOSIS — E78.5 HYPERLIPIDEMIA, UNSPECIFIED HYPERLIPIDEMIA TYPE: ICD-10-CM

## 2020-03-06 PROCEDURE — 99344 PR HOME VISIT,NEW PATIENT,LEVEL IV: ICD-10-PCS | Mod: ,,, | Performed by: NURSE PRACTITIONER

## 2020-03-06 PROCEDURE — 99344 HOME/RES VST NEW MOD MDM 60: CPT | Mod: ,,, | Performed by: NURSE PRACTITIONER

## 2020-03-07 VITALS
WEIGHT: 156 LBS | BODY MASS INDEX: 29.45 KG/M2 | DIASTOLIC BLOOD PRESSURE: 74 MMHG | TEMPERATURE: 99 F | RESPIRATION RATE: 18 BRPM | OXYGEN SATURATION: 98 % | HEART RATE: 85 BPM | SYSTOLIC BLOOD PRESSURE: 128 MMHG | HEIGHT: 61 IN

## 2020-03-07 NOTE — PROGRESS NOTES
[facility]  Transition of Care Home Visit    Visit Date: 3/6/2020  Encounter Provider: Maggy King NP  PCP:  Hugo Jurado MD    PRESENTING HISTORY      Patient ID: Grazyna Durand is a 64 y.o. female.    Consult Requested By:  Dr. Hugo Jurado  Reason for Consult:  Hospital Follow up    Grazyna is being seen at home due to Hospital Follow up.      Chief Complaint: Transitional Care      History of Present Illness: Ms. Grazyna Durand is a 64 y.o. female who was recently admitted to the hospital.    Admission Date: 2/10/2020  Hospital Length of Stay: 2 days  Discharge Date and Time: 2/12/2020     Pt admitted with multiple complaints - left sided weakness, HA and right shoulder pain.   MRI:  Impression:       Complex region in the right basal ganglia/subinsular cortex with blood fluid levels and small component of diffusion restriction.  The findings are most consistent with subacute hemorrhagic infarction in the right basal ganglia.      No new CVA findings. Possibly TIA events with presenting symptoms. Resumed gabapentin and tramadol or pain. ECHO pending. __________________________________________________________________    Today: Ms. Durand is being seen at home s/p hospitalization for CVA. She is AAOx3, VSS, she is ambulatory without assistive device but has left sided weakness. S/P hospitalization, she was admitted to [facility] for inpatient rehab. She reports her speech is much better, she has no alleviating or worsening factors. She reports compliance with all medication.    HPI:  Grazyna Durand is a 64 year old female with a past medical history Hypertension, Hyperlipidemia, and CVA. She was recently admitted for CVA and had a rehab stay after her acute stay. She was admitted to [facility] with a Hemorrhagic Stroke in 10/2019 - she presented with similar symptoms of left sided weakness.  She was discharged to home and went to stay with family in [place].     Review of Systems   Constitutional: Negative for  diaphoresis, fatigue and fever.   HENT: Negative for congestion.    Eyes: Negative for pain.   Respiratory: Negative for cough, chest tightness and shortness of breath.    Cardiovascular: Negative for chest pain, palpitations and leg swelling.   Gastrointestinal: Negative for abdominal pain, diarrhea, nausea and vomiting.   Genitourinary: Negative for difficulty urinating.   Musculoskeletal: left side weakness  Skin: Negative for color change.   Neurological: Negative for dizziness, tremors and seizures.   Psychiatric/Behavioral: Negative for agitation.     Assessments:  · Environmental: Lives in single story home with 4 steps to enter, uncluttered  · Functional Status: Independent with ADLs and mobility  · Safety: Fall precautions  · Nutritional: Low sodium  · Home Health/DME/Supplies: n/a    PAST HISTORY:     Past Medical History:   Diagnosis Date    Chronic pain     left arm    High cholesterol     Hypertension     Stroke     Octopber 2019 left arm weakness with pain       Past Surgical History:   Procedure Laterality Date    BUNIONECTOMY      LEG TENDON SURGERY Right        Family History   Problem Relation Age of Onset    Cancer Mother     No Known Problems Father        Social History     Socioeconomic History    Marital status: Single     Spouse name: Not on file    Number of children: Not on file    Years of education: Not on file    Highest education level: Not on file   Occupational History    Not on file   Social Needs    Financial resource strain: Not on file    Food insecurity:     Worry: Not on file     Inability: Not on file    Transportation needs:     Medical: Not on file     Non-medical: Not on file   Tobacco Use    Smoking status: Never Smoker    Smokeless tobacco: Never Used   Substance and Sexual Activity    Alcohol use: Not Currently     Comment: quit 10/19    Drug use: No    Sexual activity: Never   Lifestyle    Physical activity:     Days per week: Not on file      Minutes per session: Not on file    Stress: Not on file   Relationships    Social connections:     Talks on phone: Not on file     Gets together: Not on file     Attends Denominational service: Not on file     Active member of club or organization: Not on file     Attends meetings of clubs or organizations: Not on file     Relationship status: Not on file   Other Topics Concern    Not on file   Social History Narrative    Not on file       MEDICATIONS & ALLERGIES:     Current Outpatient Medications on File Prior to Visit   Medication Sig Dispense Refill    aspirin (ECOTRIN) 81 MG EC tablet Take 1 tablet (81 mg total) by mouth once daily. 90 tablet 3    atorvastatin (LIPITOR) 40 MG tablet Take 1 tablet (40 mg total) by mouth once daily. 90 tablet 3    folic acid (FOLVITE) 1 MG tablet Take 1 mg by mouth once daily.      gabapentin (NEURONTIN) 300 MG capsule Take 300 mg by mouth 3 (three) times daily.      omeprazole (PRILOSEC) 10 MG capsule Take by mouth once daily.      pantoprazole (PROTONIX) 40 MG tablet Take 1 tablet (40 mg total) by mouth once daily. 30 tablet 3    traMADol (ULTRAM) 50 mg tablet Take 1 tablet (50 mg total) by mouth every 6 (six) hours as needed. 20 tablet 0     No current facility-administered medications on file prior to visit.         Review of patient's allergies indicates:   Allergen Reactions    Benadryl [diphenhydramine hcl]      cuase her bp to elevate    Prednisone Other (See Comments)     Reactions:  Increased blood pressure    Penicillins        OBJECTIVE:     Vital Signs:  Vitals:    03/07/20 1033   BP: 128/74   Pulse: 85   Resp: 18   Temp: 98.5 °F (36.9 °C)     Body mass index is 29.48 kg/m².     Physical Exam:  Constitutional: She is oriented to person, place, and time. She appears well-developed and well-nourished.   HENT:   Head: Normocephalic and atraumatic.   Nose: Nose normal.   Mouth/Throat: Oropharynx is clear and moist.   Eyes: Pupils are equal, round, and reactive  to light. Conjunctivae and EOM are normal. No scleral icterus.   Neck: Normal range of motion. Neck supple. No JVD present. No tracheal deviation present. No thyromegaly present.   Cardiovascular: Normal rate, regular rhythm, normal heart sounds and intact distal pulses.   No murmur heard.  Pulmonary/Chest: Effort normal and breath sounds normal. No stridor. No respiratory distress. She has no wheezes.   Abdominal: Soft. Bowel sounds are normal. There is no tenderness.   Musculoskeletal: Normal range of motion. She exhibits no edema or deformity.   Neurological: She is alert and oriented to person, place, and time. No cranial nerve deficit.   Left-sided weakness with left pronator drift   Skin: Skin is warm and dry. She is not diaphoretic. No erythema.   Psychiatric: She has a normal mood and affect.     Laboratory  Lab Results   Component Value Date    WBC 7.51 02/11/2020    HGB 12.6 02/11/2020    HCT 41.1 02/11/2020    MCV 88 02/11/2020     02/11/2020     Lab Results   Component Value Date    INR 0.9 02/10/2020    INR 1.0 03/29/2016    INR 1.0 02/09/2011     Lab Results   Component Value Date    HGBA1C 5.4 03/30/2016     No results for input(s): POCTGLUCOSE in the last 72 hours.    Diagnostic Results:  n/a    TRANSITION OF CARE:     [facility] Contact Note: 02/14/2020    Family and/or Caretaker present at visit?  Yes.  Diagnostic tests reviewed/disposition: No diagnosic tests pending after this hospitalization.  Disease/illness education: Yes, Fall precautions  Home health/community services discussion/referrals: Patient does not have home health established from hospital visit.  They do not need home health.  If needed, we will set up home health for the patient.   Establishment or re-establishment of referral orders for community resources: No other necessary community resources.   Discussion with other health care providers: No discussion with other health care providers necessary.     Transition of  Care Visit:     I have reviewed and updated the history and problem list.  I have reconciled the medication list.  I have discussed the hospitalization and current medical issues, prognosis and plans with the patient/family.  I  spent more than 50% of time discussing the care with the patient/family.  Total Face-to-Face Encounter: 60 minutes.    Medications Reconciliation:   I have reconciled the patient's home medications and discharge medications with the patient/family. I have updated all changes.  Refer to After-Visit Medication List.    ASSESSMENT & PLAN:     HIGH RISK CONDITION(S):  Patient has an abrupt change in neurologic status: Abbi Geronimo was seen today for transitional care.    Diagnoses and all orders for this visit:    Essential hypertension  Continue home meds  Low sodium diet  Monitor BP    Cerebrovascular accident (CVA), unspecified mechanism  Left sided numbness  -     Ambulatory referral/consult to [facility]  Fall precautions  Continue home meds  Stroke precaution    Hyperlipidemia, unspecified hyperlipidemia type  Continue home meds      Were controlled substances prescribed?  No    Instructions for the patient:    Scheduled Follow-up :  Future Appointments   Date Time Provider Department Center   3/24/2020  8:20 AM Carlos Curran MD [facility] NEURO [facility]       After Visit Medication List :     Medication List           Accurate as of March 6, 2020 11:59 PM. If you have any questions, ask your nurse or doctor.               CONTINUE taking these medications    aspirin 81 MG EC tablet  Commonly known as:  ECOTRIN  Take 1 tablet (81 mg total) by mouth once daily.     atorvastatin 40 MG tablet  Commonly known as:  LIPITOR  Take 1 tablet (40 mg total) by mouth once daily.     folic acid 1 MG tablet  Commonly known as:  FOLVITE     Neurontin 300 MG capsule  Generic drug:  gabapentin     omeprazole 10 MG capsule  Commonly known as:  PRILOSEC     traMADol 50 mg  tablet  Commonly known as:  ULTRAM  Take 1 tablet (50 mg total) by mouth every 6 (six) hours as needed.            Signature:  Maggy King NP    Patient consent obtained prior to treatment

## 2020-03-10 NOTE — PATIENT INSTRUCTIONS
Instructions:  1. Take all medications as prescribed  2. Keep all follow-up appointments  3. Return to the hospital or call your primary care physicians if any worsening symptoms such as fever, chest pain, shortness of breath, return of symptoms, or any other concerns.

## 2020-03-23 ENCOUNTER — TELEPHONE (OUTPATIENT)
Dept: NEUROLOGY | Facility: CLINIC | Age: 65
End: 2020-03-23

## 2020-03-23 NOTE — TELEPHONE ENCOUNTER
Patient is set up for [place].          ----- Message from Yajaira Nix sent at 3/23/2020 12:28 PM CDT -----  Contact: Self/  [phone]  Type: Patient Call Back    Who called:  patient    What is the request in detail:  Patient would like staff to give her a call regarding her video appt.  Thank you    Would the patient rather a call back or a response via [facility]?   Call back    Best call back number:   [phone]

## 2020-03-24 ENCOUNTER — TELEPHONE (OUTPATIENT)
Dept: NEUROLOGY | Facility: CLINIC | Age: 65
End: 2020-03-24

## 2020-03-24 ENCOUNTER — OFFICE VISIT (OUTPATIENT)
Dept: NEUROLOGY | Facility: CLINIC | Age: 65
End: 2020-03-24
Payer: MEDICAID

## 2020-03-24 VITALS — WEIGHT: 156 LBS | BODY MASS INDEX: 29.45 KG/M2 | HEIGHT: 61 IN

## 2020-03-24 DIAGNOSIS — K21.9 GASTROESOPHAGEAL REFLUX DISEASE WITHOUT ESOPHAGITIS: ICD-10-CM

## 2020-03-24 DIAGNOSIS — E78.5 HYPERLIPIDEMIA, UNSPECIFIED HYPERLIPIDEMIA TYPE: ICD-10-CM

## 2020-03-24 DIAGNOSIS — G47.00 INSOMNIA, UNSPECIFIED TYPE: ICD-10-CM

## 2020-03-24 DIAGNOSIS — I10 ESSENTIAL HYPERTENSION: ICD-10-CM

## 2020-03-24 DIAGNOSIS — M25.512 CHRONIC LEFT SHOULDER PAIN: ICD-10-CM

## 2020-03-24 DIAGNOSIS — R20.0 LEFT SIDED NUMBNESS: ICD-10-CM

## 2020-03-24 DIAGNOSIS — G89.29 CHRONIC LEFT SHOULDER PAIN: ICD-10-CM

## 2020-03-24 DIAGNOSIS — I63.9 CEREBROVASCULAR ACCIDENT (CVA), UNSPECIFIED MECHANISM: Primary | ICD-10-CM

## 2020-03-24 PROCEDURE — 99214 OFFICE O/P EST MOD 30 MIN: CPT | Mod: 95,,, | Performed by: NEUROLOGICAL SURGERY

## 2020-03-24 PROCEDURE — 99214 PR OFFICE/OUTPT VISIT, EST, LEVL IV, 30-39 MIN: ICD-10-PCS | Mod: 95,,, | Performed by: NEUROLOGICAL SURGERY

## 2020-03-24 RX ORDER — GABAPENTIN 300 MG/1
CAPSULE ORAL
Qty: 210 CAPSULE | Refills: 11 | Status: SHIPPED | OUTPATIENT
Start: 2020-03-24 | End: 2021-07-26 | Stop reason: SDUPTHER

## 2020-03-24 RX ORDER — AMLODIPINE BESYLATE 5 MG/1
5 TABLET ORAL DAILY
Qty: 30 TABLET | Refills: 11 | Status: SHIPPED | OUTPATIENT
Start: 2020-03-24 | End: 2022-11-05

## 2020-03-24 RX ORDER — LORATADINE 10 MG/1
10 TABLET ORAL DAILY
Qty: 30 TABLET | Refills: 11 | Status: SHIPPED | OUTPATIENT
Start: 2020-03-24 | End: 2020-04-10

## 2020-03-24 RX ORDER — TRAZODONE HYDROCHLORIDE 50 MG/1
100 TABLET ORAL NIGHTLY
Qty: 60 TABLET | Refills: 11 | OUTPATIENT
Start: 2020-03-24 | End: 2020-09-12

## 2020-03-24 RX ORDER — OMEPRAZOLE 10 MG/1
10 CAPSULE, DELAYED RELEASE ORAL EVERY MORNING
Qty: 30 CAPSULE | Refills: 11 | OUTPATIENT
Start: 2020-03-24 | End: 2021-03-12

## 2020-03-24 NOTE — PROGRESS NOTES
Chief Complaint   Patient presents with    Stroke        Grazyna Durand is a 64 y.o. female with a history of multiple medical diagnoses as listed below that is establishing care after a history of stroke in February 2020.  She presented to the hospital at that time with complaints of worsening of her baseline left-sided weakness.  Weakness was residual after basal ganglia hemorrhage in October 2019.  On the day of presentation she woke up in noticed tongue heaviness, left-sided weakness, and difficulty with speech that was reminiscent of her prior episode.  She went to the emergency room for evaluation where CT and CTA were done.  She was managed in the hospital.  MRI was unremarkable for any new events and her symptoms were felt to be due to recrudescence of prior infarction.  Since leaving the hospital she has been taking her medication as prescribed including amlodipine for hypertension, enteric-coated aspirin for secondary stroke prevention in addition to statin.  Blood pressures have been well controlled.  She is no longer drinking.  She still has some shoulder pain and restricted range of motion in the left arm, that feels like it is stagnant.  Gabapentin has been helpful with her pain.  She has been having decreased ability to sleep at night.  Although lying in the bed in a dark room she feels that she has been unable to fall asleep quickly or consistently.    PAST MEDICAL HISTORY:  Past Medical History:   Diagnosis Date    Chronic pain     left arm    High cholesterol     Hypertension     Stroke     Octopber 2019 left arm weakness with pain       PAST SURGICAL HISTORY:  Past Surgical History:   Procedure Laterality Date    BUNIONECTOMY      LEG TENDON SURGERY Right        SOCIAL HISTORY:  Social History     Socioeconomic History    Marital status: Single     Spouse name: Not on file    Number of children: Not on file    Years of education: Not on file    Highest education level: Not on file    Occupational History    Not on file   Social Needs    Financial resource strain: Not on file    Food insecurity:     Worry: Not on file     Inability: Not on file    Transportation needs:     Medical: Not on file     Non-medical: Not on file   Tobacco Use    Smoking status: Never Smoker    Smokeless tobacco: Never Used   Substance and Sexual Activity    Alcohol use: Not Currently     Comment: quit 10/19    Drug use: No    Sexual activity: Never   Lifestyle    Physical activity:     Days per week: Not on file     Minutes per session: Not on file    Stress: Not on file   Relationships    Social connections:     Talks on phone: Not on file     Gets together: Not on file     Attends Jainism service: Not on file     Active member of club or organization: Not on file     Attends meetings of clubs or organizations: Not on file     Relationship status: Not on file   Other Topics Concern    Not on file   Social History Narrative    Not on file       FAMILY HISTORY:  Family History   Problem Relation Age of Onset    Cancer Mother     No Known Problems Father        ALLERGIES AND MEDICATIONS: updated and reviewed.  Review of patient's allergies indicates:   Allergen Reactions    Benadryl [diphenhydramine hcl]      cuase her bp to elevate    Prednisone Other (See Comments)     Reactions:  Increased blood pressure    Penicillins      Current Outpatient Medications   Medication Sig Dispense Refill    aspirin (ECOTRIN) 81 MG EC tablet Take 1 tablet (81 mg total) by mouth once daily. 90 tablet 3    atorvastatin (LIPITOR) 40 MG tablet Take 1 tablet (40 mg total) by mouth once daily. 90 tablet 3    folic acid (FOLVITE) 1 MG tablet Take 1 mg by mouth once daily.      gabapentin (NEURONTIN) 300 MG capsule Take 2 capsules (600 mg total) by mouth 2 (two) times daily AND 3 capsules (900 mg total) every evening. 210 capsule 11    omeprazole (PRILOSEC) 10 MG capsule Take 1 capsule (10 mg total) by mouth every  morning. 30 capsule 11    traMADol (ULTRAM) 50 mg tablet Take 1 tablet (50 mg total) by mouth every 6 (six) hours as needed. 20 tablet 0    amLODIPine (NORVASC) 5 MG tablet Take 1 tablet (5 mg total) by mouth once daily. 30 tablet 11    loratadine (CLARITIN) 10 mg tablet Take 1 tablet (10 mg total) by mouth once daily. 30 tablet 11    pantoprazole (PROTONIX) 40 MG tablet Take 1 tablet (40 mg total) by mouth once daily. 30 tablet 3    traZODone (DESYREL) 50 MG tablet Take 2 tablets (100 mg total) by mouth every evening. 60 tablet 11     No current facility-administered medications for this visit.        Review of Systems   Constitutional: Negative for activity change, appetite change, fever and unexpected weight change.   HENT: Negative for trouble swallowing and voice change.    Eyes: Negative for photophobia and visual disturbance.   Respiratory: Negative for apnea and shortness of breath.    Cardiovascular: Negative for chest pain and leg swelling.   Gastrointestinal: Negative for constipation and nausea.   Genitourinary: Negative for difficulty urinating.   Musculoskeletal: Positive for arthralgias and myalgias. Negative for back pain, gait problem and neck pain.   Skin: Negative for color change and pallor.   Neurological: Positive for weakness. Negative for dizziness, seizures, syncope and numbness.   Hematological: Negative for adenopathy.   Psychiatric/Behavioral: Negative for agitation, confusion and decreased concentration.       Neurologic Exam     Mental Status   Oriented to person, place, and time.   Registration: recalls 3 of 3 objects.   Attention: normal. Concentration: normal.   Speech: speech is normal   Level of consciousness: alert  Knowledge: good.     Cranial Nerves     CN II   Visual fields full to confrontation.   Right visual field deficit: none  Left visual field deficit: none     CN III, IV, VI   Extraocular motions are normal.   Right pupil: Size: 3 mm. Shape: regular. Accommodation:  intact.   Left pupil: Size: 3 mm. Shape: regular. Accommodation: intact.   CN III: no CN III palsy  CN VI: no CN VI palsy  Nystagmus: none   Diplopia: none  Ophthalmoparesis: none  Upgaze: normal  Downgaze: normal  Conjugate gaze: present    CN V   Facial sensation intact.   Right facial sensation deficit: none  Left facial sensation deficit: none    CN VII   Facial expression full, symmetric.   Right facial weakness: none  Left facial weakness: none    CN VIII   CN VIII normal.     CN IX, X   CN IX normal.   CN X normal.   Palate: symmetric    CN XI   CN XI normal.   Right sternocleidomastoid strength: normal  Left sternocleidomastoid strength: normal  Right trapezius strength: normal  Left trapezius strength: normal    CN XII   CN XII normal.   Tongue deviation: none    Motor Exam   Muscle bulk: normal  Overall muscle tone: normal  Right arm tone: normal  Left arm tone: normal  Right leg tone: normal  Left leg tone: normal    Strength   Strength 5/5 throughout.     Gait, Coordination, and Reflexes     Gait  Gait: normal    Coordination   Finger to nose coordination: normal  Heel to shin coordination: normal    Tremor   Resting tremor: absent    Reflexes   Right brachioradialis: 2+  Left brachioradialis: 2+  Right biceps: 2+  Left biceps: 2+  Right triceps: 2+  Left triceps: 2+  Right patellar: 2+  Left patellar: 2+  Right achilles: 2+  Left achilles: 2+      Physical Exam   Constitutional: She is oriented to person, place, and time. She appears well-developed and well-nourished.   HENT:   Head: Normocephalic and atraumatic.   Eyes: EOM are normal.   Neck: Normal range of motion.   Pulmonary/Chest: Effort normal. No apnea. No respiratory distress.   Musculoskeletal: Normal range of motion.   Neurological: She is alert and oriented to person, place, and time. She has normal strength. She has a normal Finger-Nose-Finger Test and a normal Heel to Shin Test. Gait normal.   Reflex Scores:       Tricep reflexes are 2+ on  "the right side and 2+ on the left side.       Bicep reflexes are 2+ on the right side and 2+ on the left side.       Brachioradialis reflexes are 2+ on the right side and 2+ on the left side.       Patellar reflexes are 2+ on the right side and 2+ on the left side.       Achilles reflexes are 2+ on the right side and 2+ on the left side.  Psychiatric: She has a normal mood and affect. Her speech is normal and behavior is normal. Thought content normal.   Vitals reviewed.      Vitals:    03/24/20 1039   Weight: 70.8 kg (156 lb)   Height: 5' 1" (1.549 m)       Assessment & Plan:    Problem List Items Addressed This Visit     Essential hypertension    Overview     Blood pressure goal less than 140/90.  Patient has been meeting her goal on current dose of amlodipine 5 mg daily.         Relevant Medications    amLODIPine (NORVASC) 5 MG tablet    Gastroesophageal reflux disease without esophagitis    Overview     Patient on PPI.  Discussed the importance of  the dose of her antacid medication and her statin medication for maximum efficacy of both.         Relevant Medications    loratadine (CLARITIN) 10 mg tablet    omeprazole (PRILOSEC) 10 MG capsule    Hyperlipidemia    Overview     LDL goal less than 100.  Patient on Lipitor 40 mg daily.  Tolerating well.         Left sided numbness    Overview     Stable from baseline.         Relevant Medications    gabapentin (NEURONTIN) 300 MG capsule    Cerebrovascular accident (CVA) - Primary    Overview     On aspirin and statin for secondary stroke prevention.         Left shoulder pain    Overview     Likely due to rotator cuff injury.  This will be explored further when imaging is more readily available.           Other Visit Diagnoses     Insomnia, unspecified type        Relevant Medications    traZODone (DESYREL) 50 MG tablet          The patient location is: home  The chief complaint leading to consultation is: Hospital follow-up for stroke  Visit type: Virtual " visit with synchronous audio and video  Total time spent with patient: 20  Each patient to whom he or she provides medical services by telemedicine is:  (1) informed of the relationship between the physician and patient and the respective role of any other health care provider with respect to management of the patient; and (2) notified that he or she may decline to receive medical services by telemedicine and may withdraw from such care at any time.    Notes:     Follow-up: Follow up in about 6 months (around 9/24/2020).    This note was done with the assistance of voice recognition software. Some errors may be present after proofreading.

## 2020-03-24 NOTE — TELEPHONE ENCOUNTER
Changed Prilosec 10 to 20mg so her insurance will pay for it.      ----- Message from Yajaira Nix sent at 3/24/2020 11:14 AM CDT -----  Contact: Alfonso/  [facility]/ [phone]  Type: Patient Call Back    Who called:  Alfonso    What is the request in detail:  Pharmacy would like to know if prescription can be changed to 20 mg for patients omeprazole (PRILOSEC) 10 MG capsule so she wouldn't have to pay out of pocket for medication.    Would the patient rather a call back or a response via My [employer]?   Call back    Best call back number:  [phone]

## 2020-03-24 NOTE — LETTER
March 24, 2020      Rylie Dee, DO  [address] [facility] [address]  [address]  [facility] [address]           [facility]  [address]  [facility] [address]  Phone: [phone]  Fax: [phone]          Patient: Grazyna Durand   MR Number: [id number]   YOB: 1955   Date of Visit: 3/24/2020       Dear Dr. Rylie Dee:    Thank you for referring Grazyna Durand to me for evaluation. Attached you will find relevant portions of my assessment and plan of care.    If you have questions, please do not hesitate to call me. I look forward to following Grazyna Durand along with you.    Sincerely,    Carlos Curran MD    Enclosure  CC:  No Recipients    If you would like to receive this communication electronically, please contact [email or web address] or [phone] to request more information on [employer] access.    For providers and/or their staff who would like to refer a patient to [facility], please contact us through our one-stop-shop provider referral line, [facility], at [phone].    If you feel you have received this communication in error or would no longer like to receive these types of communications, please e-mail [email or web address]

## 2020-04-10 ENCOUNTER — HOSPITAL ENCOUNTER (EMERGENCY)
Facility: HOSPITAL | Age: 65
Discharge: HOME | End: 2020-04-10
Attending: EMERGENCY MEDICINE
Payer: MEDICAID

## 2020-04-10 VITALS
RESPIRATION RATE: 15 BRPM | TEMPERATURE: 99 F | SYSTOLIC BLOOD PRESSURE: 149 MMHG | HEART RATE: 91 BPM | HEIGHT: 61 IN | OXYGEN SATURATION: 99 % | WEIGHT: 148 LBS | DIASTOLIC BLOOD PRESSURE: 79 MMHG | BODY MASS INDEX: 27.94 KG/M2

## 2020-04-10 DIAGNOSIS — R09.82 POST-NASAL DRIP: ICD-10-CM

## 2020-04-10 DIAGNOSIS — T50.905A MEDICATION SIDE EFFECT, INITIAL ENCOUNTER: Primary | ICD-10-CM

## 2020-04-10 DIAGNOSIS — I10 HYPERTENSION, UNSPECIFIED TYPE: ICD-10-CM

## 2020-04-10 PROCEDURE — 99284 EMERGENCY DEPT VISIT MOD MDM: CPT

## 2020-04-10 RX ORDER — CETIRIZINE HYDROCHLORIDE 10 MG/1
10 TABLET ORAL DAILY
Qty: 30 TABLET | Refills: 1 | Status: SHIPPED | OUTPATIENT
Start: 2020-04-10 | End: 2023-01-23

## 2020-04-10 RX ORDER — FLUTICASONE PROPIONATE 50 MCG
1 SPRAY, SUSPENSION (ML) NASAL 2 TIMES DAILY PRN
Qty: 15 G | Refills: 1 | Status: SHIPPED | OUTPATIENT
Start: 2020-04-10 | End: 2020-05-10

## 2020-04-10 RX ORDER — LOSARTAN POTASSIUM 50 MG/1
50 TABLET ORAL DAILY
COMMUNITY
End: 2020-09-12

## 2020-04-11 NOTE — ED PROVIDER NOTES
Encounter Date: 4/10/2020    SCRIBE #1 NOTE: I, Chilango Rouse, am scribing for, and in the presence of,  Solomon Ferrer MD. I have scribed the following portions of the note - Other sections scribed: HPI, ROS, PE.       History     Chief Complaint   Patient presents with    Hypertension     pt arrives via EMS for HTN and anxiety; hx of HTN and anxiety and took meds today and says she is complaint; hx of stroke in October    Anxiety     This is a 64 y.o. female who presents to the ED via EMS with c/o hypertension exacerbation secondary to anxiety beginning last night with gradual onset. She notes that symptoms began immediately after she took Sudafed as well as some other otc cough medicines. Symptoms are constant and moderate in severity. No mitigating or exacerbating factors reported. Associated symptoms include post nasal drip. Patient denies any numbness, facial asymetry, weakness outside of baseline, and all other sxs at this time.    Hx of Stroke and HTN.    The history is provided by the patient and medical records.     Review of patient's allergies indicates:   Allergen Reactions    Benadryl [diphenhydramine hcl]      cuase her bp to elevate    Prednisone Other (See Comments)     Reactions:  Increased blood pressure    Penicillins      Past Medical History:   Diagnosis Date    Chronic pain     left arm    High cholesterol     Hypertension     Stroke     Octopber 2019 left arm weakness with pain     Past Surgical History:   Procedure Laterality Date    BUNIONECTOMY      LEG TENDON SURGERY Right      Family History   Problem Relation Age of Onset    Cancer Mother     No Known Problems Father      Social History     Tobacco Use    Smoking status: Never Smoker    Smokeless tobacco: Never Used   Substance Use Topics    Alcohol use: Not Currently     Comment: quit 10/19    Drug use: No     Review of Systems   Constitutional: Negative for chills, diaphoresis and fever.   HENT: Positive for  postnasal drip. Negative for congestion and sore throat.    Eyes: Negative for visual disturbance.   Respiratory: Negative for cough and shortness of breath.    Cardiovascular: Negative for chest pain.   Gastrointestinal: Negative for abdominal pain, blood in stool, diarrhea, nausea and vomiting.        No melena.   Genitourinary: Negative for dysuria, flank pain and hematuria.   Skin: Negative for rash and wound.   Neurological: Negative for dizziness, speech difficulty, weakness, numbness and headaches.   Psychiatric/Behavioral: Negative for confusion. The patient is nervous/anxious.    All other systems reviewed and are negative.      Physical Exam     Initial Vitals [04/10/20 1902]   BP Pulse Resp Temp SpO2   (!) 170/115 104 18 97.7 °F (36.5 °C) 100 %      MAP       --         Physical Exam    Nursing note and vitals reviewed.  Constitutional: She appears well-developed and well-nourished. She is not diaphoretic. No distress.   HENT:   Head: Normocephalic and atraumatic.   Nose: Nose normal.   Mouth/Throat: Oropharynx is clear and moist.   Eyes: Conjunctivae and EOM are normal. Pupils are equal, round, and reactive to light. Right eye exhibits no discharge. Left eye exhibits no discharge. No scleral icterus.   Neck: Normal range of motion. Neck supple. No thyromegaly present. No tracheal deviation present. No JVD present.   Cardiovascular: Normal rate, regular rhythm, normal heart sounds and intact distal pulses. Exam reveals no gallop and no friction rub.    No murmur heard.  Pulmonary/Chest: Breath sounds normal. No stridor. No respiratory distress. She has no wheezes. She has no rhonchi. She has no rales. She exhibits no tenderness.   Abdominal: Soft. She exhibits no distension and no mass. There is no tenderness. There is no rebound and no guarding.   Musculoskeletal: Normal range of motion. She exhibits no edema or tenderness.   Neurological: She is alert and oriented to person, place, and time. She has  normal strength. No cranial nerve deficit or sensory deficit. GCS score is 15. GCS eye subscore is 4. GCS verbal subscore is 5. GCS motor subscore is 6.   COLLIN with NGND's.  CN II-XII intact.  F2N and H2S intact.  Steady gait with no ataxia   Skin: Skin is warm and dry. Capillary refill takes less than 2 seconds. No rash and no abscess noted. No erythema. No pallor.   Psychiatric: She has a normal mood and affect. Her behavior is normal. Judgment and thought content normal.         ED Course   Procedures  Labs Reviewed - No data to display       Imaging Results    None          Medical Decision Making:   History:   Old Medical Records: I decided to obtain old medical records.  Initial Assessment:   Past medical records queried and reviewed.  This is a 64 y.o. female who presents to the ED via EMS with c/o hypertension exacerbation secondary to anxiety beginning last night with gradual onset. The patient was seen and examined. The history and physical exam was obtained. The nursing notes and vital signs were reviewed.   Differential Diagnosis:   Anxiety, Hypertension Exacerbation            Scribe Attestation:   Scribe #1: I performed the above scribed service and the documentation accurately describes the services I performed. I attest to the accuracy of the note.    Pt arrived alert, afebrile, non-toxic in appearance, in no acute respiratory distress with HTN and remaining VSS.  Pt had no pronator drift or facial drip with no clinical findings to raise concern for TIA, stroke or any other pathology to warrant further evaluation.  Pt had taken phenylephrine to treat her post nasal drip which elevated her BP and counseled to discontinue the medication.  Pt also counseled to consult the pharmacist prior to taking any OTC meds to ensure they do not affect her blood pressure.  Pt discharged and counseled on the need to return to the nearest emergency room if they experience any other concerning symptoms.  Pt counseled  to F/U outpatient with a PCP over the next two to three days.    Solomon Ferrer MD                            Clinical Impression:       ICD-10-CM ICD-9-CM   1. Medication side effect, initial encounter T50.905A E947.9   2. Post-nasal drip R09.82 784.91   3. Hypertension, unspecified type I10 401.9             ED Disposition Condition    Discharge Stable        ED Prescriptions     Medication Sig Dispense Start Date End Date Auth. Provider    cetirizine (ZYRTEC) 10 MG tablet Take 1 tablet (10 mg total) by mouth once daily. 30 tablet 4/10/2020 5/10/2020 Solomon Ferrer MD    fluticasone propionate (FLONASE) 50 mcg/actuation nasal spray 1 spray (50 mcg total) by Each Nostril route 2 (two) times daily as needed for Rhinitis. 15 g 4/10/2020 5/10/2020 Solomon Ferrer MD        Follow-up Information     Follow up With Specialties Details Why Contact Info    Hugo Jurado MD Family Medicine Schedule an appointment as soon as possible for a visit in 1 week to follow-up on today's visit [address] [facility] [address]  [phone]      [facility] Emergency Medicine Go to  As needed, If symptoms worsen [address] [place] [address] [phone]                      I, Solomon Ferrer, personally performed the services described in this documentation. All medical record entries made by the scribe were at my direction and in my presence.  I have reviewed the chart and agree that the record reflects my personal performance and is accurate and complete.                     Solomon Ferrer MD  04/11/20 0243       Solomon Ferrer MD  04/11/20 0244

## 2020-04-11 NOTE — ED TRIAGE NOTES
"Pt arrives via ems with c/o anxiety and "feeling shaky" Pt states "I was about to reynaga some fish and got really worked up. i felt anxious and could not calm down" Pt has h/o anxiety, tia, stroke, and HTN. Pt BP is elevated. Pt denies blurred vision, dizziness, HA. Pt also states she has had some sinus issues recently. C/o congestion and facial discomfort, Denies fever, chills. Pt denies chest pain, sob, and cough. Pt alert and oriented x4. No stroke like symptoms noted, no weakness.   "

## 2020-04-11 NOTE — DISCHARGE INSTRUCTIONS
Go to your primary care physician next week to have your blood pressure checked. Avoid any products containing phenylephrine as it will raise your blood pressure.  Please check with a pharmacist when purchasing any over the counter medication to ensure you are not taking any medications which may elevate your blood pressure.  Please return to the nearest emergency room if you experience any other concerning symptoms.

## 2020-05-19 ENCOUNTER — TELEPHONE (OUTPATIENT)
Dept: NEUROLOGY | Facility: CLINIC | Age: 65
End: 2020-05-19

## 2020-05-19 NOTE — TELEPHONE ENCOUNTER
Told patient she has to get Tramadol form pcp.            ----- Message from Mariela Cavazos sent at 5/18/2020  4:15 PM CDT -----  Contact: Patient [phone]  Type: RX Refill Request    Who Called: Patient    Have you contacted your pharmacy: No    Refill or New Rx: Refill    RX Name and Strength: traMADol (ULTRAM) 50 mg tablet    Is this a 30 day or 90 day RX: 90 day    Preferred Pharmacy with phone number: .  [facility] [place], [place] - [address]  [address]  [facility] [address]  Phone: [phone] Fax: [phone]    Local or Mail Order: Local    Would the patient rather a call back or a response via My [facility]? Call back    Best Call Back Number: [phone]    Additional Information:  Pt states that she's out of the medication. She's having problems with her shoulder waking her up at night. States she's really having pain in that shoulder. Please send in refill.

## 2020-05-19 NOTE — TELEPHONE ENCOUNTER
Spoke with patient everna she had CT and MRI done in 02/2020. Dr. Curran wants her back in 6mos.            ----- Message from Mariela Cavazos sent at 5/18/2020  4:13 PM CDT -----  Contact: Patient [phone]  Type: Patient Call Back    Who called: Patient    What is the request in detail: Would like to speak to nurse in regards to a CT Scan that she was supposed to have done before the offices closed, due to COVID19. Please call pt in regards to this.     Would the patient rather a call back or a response via [facility]? Call back    Best call back number: [phone]

## 2020-07-20 ENCOUNTER — TELEPHONE (OUTPATIENT)
Dept: NEUROLOGY | Facility: CLINIC | Age: 65
End: 2020-07-20

## 2020-07-20 NOTE — TELEPHONE ENCOUNTER
Made an appt.            ----- Message from Mariela Cavazos sent at 7/20/2020 10:28 AM CDT -----  Contact: Patient [phone]  .Name of Caller Patient  Reason for Visit/Symptoms Headaches  Best Contact Number or Confirm if Mychart Preferred [phone]  Preferred Date/Time of Appointment Today, 07-20-20  Interested in Virtual Visit (yes/no) Yes  Additional Information Pt states that she's been having a slight headache for a couple of days now. She kind of worried about the headache, by her having a stroke. Pt didn't want to talk to On Call Nursing. States she'll wait for Dr Curran to call her. Please call.

## 2020-07-29 ENCOUNTER — OFFICE VISIT (OUTPATIENT)
Dept: NEUROLOGY | Facility: CLINIC | Age: 65
End: 2020-07-29
Payer: MEDICAID

## 2020-07-29 DIAGNOSIS — I63.9 CEREBROVASCULAR ACCIDENT (CVA), UNSPECIFIED MECHANISM: Primary | ICD-10-CM

## 2020-07-29 PROCEDURE — 99499 UNLISTED E&M SERVICE: CPT | Mod: 95,,, | Performed by: NEUROLOGICAL SURGERY

## 2020-07-29 PROCEDURE — 99499 NO LOS: ICD-10-PCS | Mod: 95,,, | Performed by: NEUROLOGICAL SURGERY

## 2020-07-30 ENCOUNTER — OFFICE VISIT (OUTPATIENT)
Dept: NEUROLOGY | Facility: CLINIC | Age: 65
End: 2020-07-30
Payer: MEDICAID

## 2020-07-30 DIAGNOSIS — I63.9 CEREBROVASCULAR ACCIDENT (CVA), UNSPECIFIED MECHANISM: Primary | ICD-10-CM

## 2020-07-30 DIAGNOSIS — M54.12 CERVICAL RADICULOPATHY: ICD-10-CM

## 2020-07-30 DIAGNOSIS — I63.89 OTHER CEREBRAL INFARCTION: ICD-10-CM

## 2020-07-30 DIAGNOSIS — E78.5 HYPERLIPIDEMIA, UNSPECIFIED HYPERLIPIDEMIA TYPE: ICD-10-CM

## 2020-07-30 DIAGNOSIS — G47.00 INSOMNIA, UNSPECIFIED TYPE: ICD-10-CM

## 2020-07-30 PROCEDURE — 99213 PR OFFICE/OUTPT VISIT, EST, LEVL III, 20-29 MIN: ICD-10-PCS | Mod: 95,,, | Performed by: NEUROLOGICAL SURGERY

## 2020-07-30 PROCEDURE — 99213 OFFICE O/P EST LOW 20 MIN: CPT | Mod: 95,,, | Performed by: NEUROLOGICAL SURGERY

## 2020-07-30 RX ORDER — ATORVASTATIN CALCIUM 40 MG/1
40 TABLET, FILM COATED ORAL DAILY
Qty: 90 TABLET | Refills: 3 | Status: SHIPPED | OUTPATIENT
Start: 2020-07-30 | End: 2022-11-05

## 2020-07-30 RX ORDER — LEMBOREXANT 5 MG/1
5 TABLET, FILM COATED ORAL NIGHTLY PRN
Qty: 30 TABLET | Refills: 2 | Status: SHIPPED | OUTPATIENT
Start: 2020-07-30 | End: 2020-10-28

## 2020-08-07 ENCOUNTER — HOSPITAL ENCOUNTER (OUTPATIENT)
Dept: RADIOLOGY | Facility: HOSPITAL | Age: 65
Discharge: HOME | End: 2020-08-07
Attending: NEUROLOGICAL SURGERY
Payer: MEDICAID

## 2020-08-07 DIAGNOSIS — I63.89 OTHER CEREBRAL INFARCTION: ICD-10-CM

## 2020-08-07 DIAGNOSIS — I63.9 CEREBROVASCULAR ACCIDENT (CVA), UNSPECIFIED MECHANISM: ICD-10-CM

## 2020-08-07 DIAGNOSIS — M54.12 CERVICAL RADICULOPATHY: ICD-10-CM

## 2020-08-07 PROCEDURE — 72125 CT NECK SPINE W/O DYE: CPT | Mod: TC

## 2020-08-07 PROCEDURE — 70450 CT HEAD/BRAIN W/O DYE: CPT | Mod: 26,,, | Performed by: RADIOLOGY

## 2020-08-07 PROCEDURE — 70450 CT HEAD/BRAIN W/O DYE: CPT | Mod: TC

## 2020-08-07 PROCEDURE — 72125 CT NECK SPINE W/O DYE: CPT | Mod: 26,,, | Performed by: RADIOLOGY

## 2020-08-07 PROCEDURE — 70450 CT HEAD WITHOUT CONTRAST: ICD-10-PCS | Mod: 26,,, | Performed by: RADIOLOGY

## 2020-08-07 PROCEDURE — 72125 CT CERVICAL SPINE WITHOUT CONTRAST: ICD-10-PCS | Mod: 26,,, | Performed by: RADIOLOGY

## 2020-08-13 NOTE — PROGRESS NOTES
No chief complaint on file.       Grazyna Durand is a 65 y.o. female with a history of multiple medical diagnoses as listed below that is establishing care after a history of stroke in February 2020.  She presented to the hospital at that time with complaints of worsening of her baseline left-sided weakness.  Weakness was residual after basal ganglia hemorrhage in October 2019.  On the day of presentation she woke up in noticed tongue heaviness, left-sided weakness, and difficulty with speech that was reminiscent of her prior episode.  She went to the emergency room for evaluation where CT and CTA were done.  She was managed in the hospital.  MRI was unremarkable for any new events and her symptoms were felt to be due to recrudescence of prior infarction.  Since leaving the hospital she has been taking her medication as prescribed including amlodipine for hypertension, enteric-coated aspirin for secondary stroke prevention in addition to statin.  Blood pressures have been well controlled.  She is no longer drinking.  She still has some shoulder pain and restricted range of motion in the left arm, that feels like it is stagnant.  Gabapentin has been helpful with her pain.  She has been having decreased ability to sleep at night.  Although lying in the bed in a dark room she feels that she has been unable to fall asleep quickly or consistently.    Interval history  07/30/2020  CTs have difficulty with headaches.  She has also been having increasing difficulty with her sleeping which she feels has been a big trigger for her headaches.  Medications have been taking this point as directed without any complaints of side effects.  She has tried various medications to help , but none have offer significant improvement nor have they allowed her to go to sleep quickly stay asleep throughout the night..    PAST MEDICAL HISTORY:  Past Medical History:   Diagnosis Date    Chronic pain     left arm    High cholesterol      Hypertension     Stroke     Octopber 2019 left arm weakness with pain       PAST SURGICAL HISTORY:  Past Surgical History:   Procedure Laterality Date    BUNIONECTOMY      LEG TENDON SURGERY Right        SOCIAL HISTORY:  Social History     Socioeconomic History    Marital status: Single     Spouse name: Not on file    Number of children: Not on file    Years of education: Not on file    Highest education level: Not on file   Occupational History    Not on file   Social Needs    Financial resource strain: Not on file    Food insecurity     Worry: Not on file     Inability: Not on file    Transportation needs     Medical: Not on file     Non-medical: Not on file   Tobacco Use    Smoking status: Never Smoker    Smokeless tobacco: Never Used   Substance and Sexual Activity    Alcohol use: Not Currently     Comment: quit 10/19    Drug use: No    Sexual activity: Never   Lifestyle    Physical activity     Days per week: Not on file     Minutes per session: Not on file    Stress: Not on file   Relationships    Social connections     Talks on phone: Not on file     Gets together: Not on file     Attends Yazidi service: Not on file     Active member of club or organization: Not on file     Attends meetings of clubs or organizations: Not on file     Relationship status: Not on file   Other Topics Concern    Not on file   Social History Narrative    Not on file       FAMILY HISTORY:  Family History   Problem Relation Age of Onset    Cancer Mother     No Known Problems Father        ALLERGIES AND MEDICATIONS: updated and reviewed.  Review of patient's allergies indicates:   Allergen Reactions    Benadryl [diphenhydramine hcl]      cuase her bp to elevate    Prednisone Other (See Comments)     Reactions:  Increased blood pressure    Penicillins      Current Outpatient Medications   Medication Sig Dispense Refill    amLODIPine (NORVASC) 5 MG tablet Take 1 tablet (5 mg total) by mouth once daily. 30  tablet 11    aspirin (ECOTRIN) 81 MG EC tablet Take 1 tablet (81 mg total) by mouth once daily. 90 tablet 3    atorvastatin (LIPITOR) 40 MG tablet Take 1 tablet (40 mg total) by mouth once daily. 90 tablet 3    cetirizine (ZYRTEC) 10 MG tablet Take 1 tablet (10 mg total) by mouth once daily. 30 tablet 1    folic acid (FOLVITE) 1 MG tablet Take 1 mg by mouth once daily.      gabapentin (NEURONTIN) 300 MG capsule Take 2 capsules (600 mg total) by mouth 2 (two) times daily AND 3 capsules (900 mg total) every evening. 210 capsule 11    lemborexant (DAYVIGO) 5 mg Tab Take 5 mg by mouth nightly as needed (insomnia). 30 tablet 2    losartan (COZAAR) 50 MG tablet Take 50 mg by mouth once daily.      omeprazole (PRILOSEC) 10 MG capsule Take 1 capsule (10 mg total) by mouth every morning. 30 capsule 11    pantoprazole (PROTONIX) 40 MG tablet Take 1 tablet (40 mg total) by mouth once daily. 30 tablet 3    traMADol (ULTRAM) 50 mg tablet Take 1 tablet (50 mg total) by mouth every 6 (six) hours as needed. 20 tablet 0    traZODone (DESYREL) 50 MG tablet Take 2 tablets (100 mg total) by mouth every evening. 60 tablet 11     No current facility-administered medications for this visit.        Review of Systems   Constitutional: Negative for activity change, appetite change, fever and unexpected weight change.   HENT: Negative for trouble swallowing and voice change.    Eyes: Negative for photophobia and visual disturbance.   Respiratory: Negative for apnea and shortness of breath.    Cardiovascular: Negative for chest pain and leg swelling.   Gastrointestinal: Negative for constipation and nausea.   Genitourinary: Negative for difficulty urinating.   Musculoskeletal: Positive for arthralgias and myalgias. Negative for back pain, gait problem and neck pain.   Skin: Negative for color change and pallor.   Neurological: Positive for weakness. Negative for dizziness, seizures, syncope and numbness.   Hematological: Negative  for adenopathy.   Psychiatric/Behavioral: Negative for agitation, confusion and decreased concentration.       Neurologic Exam     Mental Status   Oriented to person, place, and time.   Registration: recalls 3 of 3 objects.   Attention: normal. Concentration: normal.   Speech: speech is normal   Level of consciousness: alert  Knowledge: good.     Cranial Nerves     CN II   Visual fields full to confrontation.   Right visual field deficit: none  Left visual field deficit: none     CN III, IV, VI   Extraocular motions are normal.   Right pupil: Size: 3 mm. Shape: regular. Accommodation: intact.   Left pupil: Size: 3 mm. Shape: regular. Accommodation: intact.   CN III: no CN III palsy  CN VI: no CN VI palsy  Nystagmus: none   Diplopia: none  Ophthalmoparesis: none  Upgaze: normal  Downgaze: normal  Conjugate gaze: present    CN V   Facial sensation intact.   Right facial sensation deficit: none  Left facial sensation deficit: none    CN VII   Facial expression full, symmetric.   Right facial weakness: none  Left facial weakness: none    CN VIII   CN VIII normal.     CN IX, X   CN IX normal.   CN X normal.   Palate: symmetric    CN XI   CN XI normal.   Right sternocleidomastoid strength: normal  Left sternocleidomastoid strength: normal  Right trapezius strength: normal  Left trapezius strength: normal    CN XII   CN XII normal.   Tongue deviation: none    Motor Exam   Muscle bulk: normal  Overall muscle tone: normal  Right arm tone: normal  Left arm tone: normal  Right leg tone: normal  Left leg tone: normal    Strength   Strength 5/5 throughout.     Gait, Coordination, and Reflexes     Gait  Gait: normal    Coordination   Finger to nose coordination: normal  Heel to shin coordination: normal    Tremor   Resting tremor: absent    Reflexes   Right brachioradialis: 2+  Left brachioradialis: 2+  Right biceps: 2+  Left biceps: 2+  Right triceps: 2+  Left triceps: 2+  Right patellar: 2+  Left patellar: 2+  Right achilles:  2+  Left achilles: 2+      Physical Exam  Vitals signs reviewed.   Constitutional:       Appearance: She is well-developed.   HENT:      Head: Normocephalic and atraumatic.   Eyes:      Extraocular Movements: EOM normal.   Neck:      Musculoskeletal: Normal range of motion.   Pulmonary:      Effort: Pulmonary effort is normal. No respiratory distress.   Musculoskeletal: Normal range of motion.   Neurological:      Mental Status: She is alert and oriented to person, place, and time.      Coordination: Finger-Nose-Finger Test and Heel to Shin Test normal.      Gait: Gait is intact.      Deep Tendon Reflexes: Strength normal.      Reflex Scores:       Tricep reflexes are 2+ on the right side and 2+ on the left side.       Bicep reflexes are 2+ on the right side and 2+ on the left side.       Brachioradialis reflexes are 2+ on the right side and 2+ on the left side.       Patellar reflexes are 2+ on the right side and 2+ on the left side.       Achilles reflexes are 2+ on the right side and 2+ on the left side.  Psychiatric:         Speech: Speech normal.         Behavior: Behavior normal.         Thought Content: Thought content normal.         There were no vitals filed for this visit.    Assessment & Plan:    Problem List Items Addressed This Visit     Hyperlipidemia    Overview     LDL goal less than 100.  Patient on Lipitor 40 mg daily.  Tolerating well.         Relevant Medications    atorvastatin (LIPITOR) 40 MG tablet    Cerebrovascular accident (CVA) - Primary    Overview     On aspirin and statin for secondary stroke prevention.         Relevant Medications    atorvastatin (LIPITOR) 40 MG tablet    Other Relevant Orders    CT Head Without Contrast (Completed)      Other Visit Diagnoses     Cervical radiculopathy        Relevant Orders    CT Cervical Spine Without Contrast (Completed)    Insomnia, unspecified type        Relevant Medications    lemborexant (DAYVIGO) 5 mg Tab    Other cerebral infarction         Relevant Orders    CT Head Without Contrast (Completed)          The patient location is: home  The chief complaint leading to consultation is: Hospital follow-up for stroke  Visit type: Virtual visit with synchronous audio and video  Total time spent with patient: 20  Each patient to whom he or she provides medical services by telemedicine is:  (1) informed of the relationship between the physician and patient and the respective role of any other health care provider with respect to management of the patient; and (2) notified that he or she may decline to receive medical services by telemedicine and may withdraw from such care at any time.    Notes:     Follow-up: Follow up in about 3 months (around 10/30/2020).    This note was done with the assistance of voice recognition software. Some errors may be present after proofreading.

## 2020-08-19 ENCOUNTER — OFFICE VISIT (OUTPATIENT)
Dept: NEUROLOGY | Facility: CLINIC | Age: 65
End: 2020-08-19
Payer: MEDICAID

## 2020-08-19 VITALS
WEIGHT: 158.5 LBS | HEART RATE: 76 BPM | SYSTOLIC BLOOD PRESSURE: 142 MMHG | BODY MASS INDEX: 29.92 KG/M2 | DIASTOLIC BLOOD PRESSURE: 91 MMHG | HEIGHT: 61 IN

## 2020-08-19 DIAGNOSIS — I63.9 CEREBROVASCULAR ACCIDENT (CVA), UNSPECIFIED MECHANISM: Primary | ICD-10-CM

## 2020-08-19 DIAGNOSIS — E78.5 HYPERLIPIDEMIA, UNSPECIFIED HYPERLIPIDEMIA TYPE: ICD-10-CM

## 2020-08-19 DIAGNOSIS — I10 ESSENTIAL HYPERTENSION: ICD-10-CM

## 2020-08-19 PROCEDURE — 99214 PR OFFICE/OUTPT VISIT, EST, LEVL IV, 30-39 MIN: ICD-10-PCS | Mod: S$PBB,,, | Performed by: NEUROLOGICAL SURGERY

## 2020-08-19 PROCEDURE — 99214 OFFICE O/P EST MOD 30 MIN: CPT | Mod: S$PBB,,, | Performed by: NEUROLOGICAL SURGERY

## 2020-08-19 PROCEDURE — 99213 OFFICE O/P EST LOW 20 MIN: CPT | Mod: PBBFAC | Performed by: NEUROLOGICAL SURGERY

## 2020-08-19 PROCEDURE — 99999 PR PBB SHADOW E&M-EST. PATIENT-LVL III: CPT | Mod: PBBFAC,,, | Performed by: NEUROLOGICAL SURGERY

## 2020-08-19 PROCEDURE — 99999 PR PBB SHADOW E&M-EST. PATIENT-LVL III: ICD-10-PCS | Mod: PBBFAC,,, | Performed by: NEUROLOGICAL SURGERY

## 2020-09-12 ENCOUNTER — HOSPITAL ENCOUNTER (EMERGENCY)
Facility: HOSPITAL | Age: 65
Discharge: HOME | End: 2020-09-12
Attending: EMERGENCY MEDICINE
Payer: MEDICAID

## 2020-09-12 VITALS
WEIGHT: 160 LBS | BODY MASS INDEX: 30.21 KG/M2 | HEIGHT: 61 IN | HEART RATE: 69 BPM | DIASTOLIC BLOOD PRESSURE: 92 MMHG | SYSTOLIC BLOOD PRESSURE: 139 MMHG | OXYGEN SATURATION: 99 % | TEMPERATURE: 99 F | RESPIRATION RATE: 16 BRPM

## 2020-09-12 DIAGNOSIS — M54.50 ACUTE BILATERAL LOW BACK PAIN WITHOUT SCIATICA: Primary | ICD-10-CM

## 2020-09-12 PROCEDURE — 99284 EMERGENCY DEPT VISIT MOD MDM: CPT | Mod: 25

## 2020-09-12 PROCEDURE — 25000003 PHARM REV CODE 250: Performed by: NURSE PRACTITIONER

## 2020-09-12 RX ORDER — TRAMADOL HYDROCHLORIDE 50 MG/1
50 TABLET ORAL EVERY 6 HOURS PRN
Qty: 5 TABLET | Refills: 0 | Status: SHIPPED | OUTPATIENT
Start: 2020-09-12 | End: 2022-11-05

## 2020-09-12 RX ORDER — LIDOCAINE 50 MG/G
1 PATCH TOPICAL DAILY
Qty: 15 PATCH | Refills: 0 | OUTPATIENT
Start: 2020-09-12 | End: 2022-04-01

## 2020-09-12 RX ORDER — NAPROXEN 500 MG/1
500 TABLET ORAL 2 TIMES DAILY WITH MEALS
Qty: 14 TABLET | Refills: 0 | Status: SHIPPED | OUTPATIENT
Start: 2020-09-12 | End: 2020-09-19

## 2020-09-12 RX ORDER — HYDROCODONE BITARTRATE AND ACETAMINOPHEN 5; 325 MG/1; MG/1
1 TABLET ORAL
Status: COMPLETED | OUTPATIENT
Start: 2020-09-12 | End: 2020-09-12

## 2020-09-12 RX ADMIN — HYDROCODONE BITARTRATE AND ACETAMINOPHEN 1 TABLET: 5; 325 TABLET ORAL at 12:09

## 2020-09-12 NOTE — ED TRIAGE NOTES
"Patient presents for lumbar back pain x 3 days. States she heard something pop while mopping and thinks she pulled a muscle. Denies bowel or bladder problems. However states her urine "smells strong" since this morning. Went to Urgent Care yesterday and was prescribed Flexeril, last taken at 7pm yesterday. Also took Tylenol at that time. Nothing taken this am except BP meds. BP is currently 168/100. All other vitals WNL.  "

## 2020-09-12 NOTE — ED PROVIDER NOTES
Encounter Date: 9/12/2020    SCRIBE #1 NOTE: I, Karyn Stroud, am scribing for, and in the presence of,  Tiffanie King NP. I have scribed the following portions of the note - Other sections scribed: HPI, ROS.       History     Chief Complaint   Patient presents with    Back Pain     Pt c/o bilateral lower back pain x3 days. States she thinks she pulled something while picking up a mop bucket. Pain worse on the right. Pain is 10/10     CC: Back pain    HPI: This is a 65 y.o. female with a history of hypertension and hyperlipidemia who presents today c/o 10/10 lower back pain that began suddenly after picking up a bucket three days ago. Patient reports no associated symptoms at this time and denies fever, chills, abdominal pain, dysuria, numbness, or weakness. Patient states she was evaluated at urgent care yesterday and was given Flexeril which she has been taking with no relief. She reports no prior history of similar symptoms or injuries. Patient is not a smoker and is allergic to Benadryl, Prednisone, and Penicillin.    The history is provided by the patient. No  was used.     Review of patient's allergies indicates:   Allergen Reactions    Benadryl [diphenhydramine hcl]      cuase her bp to elevate    Prednisone Other (See Comments)     Reactions:  Increased blood pressure    Penicillins      Past Medical History:   Diagnosis Date    Chronic pain     left arm    High cholesterol     Hypertension     Stroke     Octopber 2019 left arm weakness with pain     Past Surgical History:   Procedure Laterality Date    BUNIONECTOMY      LEG TENDON SURGERY Right      Family History   Problem Relation Age of Onset    Cancer Mother     No Known Problems Father      Social History     Tobacco Use    Smoking status: Never Smoker    Smokeless tobacco: Never Used   Substance Use Topics    Alcohol use: Not Currently     Comment: quit 10/19    Drug use: No     Review of Systems    Constitutional: Negative for chills and fever.   HENT: Negative for congestion, rhinorrhea and sore throat.    Eyes: Negative for visual disturbance.   Respiratory: Negative for cough and shortness of breath.    Cardiovascular: Negative for chest pain.   Gastrointestinal: Negative for abdominal pain, diarrhea, nausea and vomiting.   Genitourinary: Negative for dysuria, frequency and hematuria.   Musculoskeletal: Positive for back pain (lower).   Skin: Negative for rash.   Neurological: Negative for dizziness, weakness and headaches.       Physical Exam     Initial Vitals [09/12/20 1150]   BP Pulse Resp Temp SpO2   (!) 162/94 95 18 98.5 °F (36.9 °C) 99 %      MAP       --         Physical Exam    Constitutional: She appears well-developed and well-nourished. She is not diaphoretic. No distress.   HENT:   Head: Normocephalic and atraumatic.   Neck: Normal range of motion.   Cardiovascular: Normal rate, regular rhythm, normal heart sounds and intact distal pulses.   Pulmonary/Chest: Breath sounds normal. No respiratory distress.   Abdominal: Soft. Bowel sounds are normal. There is no hepatosplenomegaly. There is no abdominal tenderness. There is no rigidity, no rebound, no guarding, no CVA tenderness, no tenderness at McBurney's point and negative Ray's sign. No hernia.   Musculoskeletal: Normal range of motion.      Cervical back: Normal.      Thoracic back: Normal.      Lumbar back: She exhibits tenderness.        Back:       Comments: Patient is ambulatory without assistance or antalgic gait.  There is tenderness palpation of the paraspinal lumbar musculature.  No rash or skin lesion.  5/5 strength bilateral upper and lower extremities with sensation intact.   Neurological: She is alert and oriented to person, place, and time.   Skin: Skin is warm and dry.   Psychiatric: She has a normal mood and affect. Her behavior is normal.         ED Course   Procedures  Labs Reviewed - No data to display       Imaging  Results          X-Ray Lumbar Spine Ap And Lateral (Final result)  Result time 09/12/20 13:29:08    Final result by Jayro Mckeon MD (09/12/20 13:29:08)                 Impression:      1. Mild degenerative changes of the lumbar spine.  No acute radiographic abnormalities.      Electronically signed by: Jayro Mckeon MD  Date:    09/12/2020  Time:    13:29             Narrative:    EXAMINATION:  XR LUMBAR SPINE AP AND LATERAL    CLINICAL HISTORY:  Back pain or radiculopathy, trauma;    TECHNIQUE:  AP, lateral and spot images were performed of the lumbar spine.    COMPARISON:  None.    FINDINGS:  Lumbar spine alignment is within normal limits.  No spondylolisthesis.  No spondylolysis.  Vertebral body heights are well maintained without evidence for fracture.  Visualized pedicles, spinous processes and transverse processes appear intact.  There is mild intervertebral disc space narrowing at L4-L5 and L5-S1.  Facet joints are well aligned. Mild facet hypertrophic changes suspected at L3-L4 and L4-L5. SI joints are symmetric.  Sacrum demonstrates no significant abnormalities.  There is scattered gas within nondilated loops of bowel.                                 Medical Decision Making:   ED Management:    This is an evaluation of a 65 y.o. female that presents to the Emergency Department for back pain.  Denies fever, rash, night sweats, weight loss, dysuria, bowel/bladder incontinence, or IV\SQ drug use. The patient is a non-toxic, afebrile, and well appearing female. On physical exam, there is tenderness with palpation of the paraspinal lumbar musculature. Thereis no abdominal pain to palpation, CVA tenderness, saddle anesthesia. Reflexes and sensation are symmetric bilaterally.DP pulses are symmetrical.     Vital signs reassuring. RESULTS:   X-ray negative for acute process such as fracture dislocation.  There is mild degenerative changes noted.    Given the above findings, I do not think the patient has  acute vertebral fracture, subluxation, dislocation, epidural abscess, cauda equina, shingles, UTI.    The diagnosis, treatment plan, instructions for follow-up and reevaluation with PCP as well as ED return precautions were discussed and understanding was verbalized. All questions or concerns have been addressed.             Scribe Attestation:   Scribe #1: I performed the above scribed service and the documentation accurately describes the services I performed. I attest to the accuracy of the note.                      Clinical Impression:     1. Acute bilateral low back pain without sciatica            Disposition:   Disposition: Discharged  Condition: Stable     ED Disposition Condition    Discharge Stable        ED Prescriptions     Medication Sig Dispense Start Date End Date Auth. Provider    naproxen (NAPROSYN) 500 MG tablet Take 1 tablet (500 mg total) by mouth 2 (two) times daily with meals. for 7 days 14 tablet 9/12/2020 9/19/2020 Tiffanie King NP    traMADoL (ULTRAM) 50 mg tablet Take 1 tablet (50 mg total) by mouth every 6 (six) hours as needed for Pain. 5 tablet 9/12/2020  Tiffanie King NP    lidocaine (LIDODERM) 5 % Place 1 patch onto the skin once daily. Remove & Discard patch within 12 hours or as directed by MD 15 patch 9/12/2020  Tiffanie King NP        Follow-up Information     Follow up With Specialties Details Why Contact Info    Hugo Jurado MD Family Medicine Schedule an appointment as soon as possible for a visit  For follow-up [address] [facility] [address]  [phone]      [facility]  Schedule an appointment as soon as possible for a visit in 1 day For follow-up if you do not have a primary care doctor [address] [place] [address]  [phone]      [facility] Emergency Medicine Go to  If symptoms worsen [address]  [facility] [address] [phone]                      MARYANN PARMAR, personally  performed the services described in this documentation. All medical record entries made by the scribe were at my direction and in my presence. I have reviewed the chart and agree that the record reflects my personal performance and is accurate and complete.                   Tiffanie King NP  09/12/20 [id number]

## 2020-09-12 NOTE — ED NOTES
Pt ambulatory to discharge, pt verbalized understanding of medications and to f/u with pcp should symptoms persist. All personal belongings with the patient at the time of discharge. Pt ambulatory to discharge, NAD.

## 2020-09-12 NOTE — DISCHARGE INSTRUCTIONS
You have been prescribed TRAMADOL for pain. Please do not take this medication while working, drinking alcohol, swimming, or while driving/operating heavy machinery. This medication may cause drowsiness, impair judgment, and reduce physical capabilities.    You have been prescribed Naproxen for pain. This is an Non-Steroidal Anti-Inflammatory (NSAID) Medication. Please do not take any additional NSAIDs while you are taking this medication including (Advil, Aleve, Motrin, Ibuprofen, Mobic\meloxicam, Naprosyn, etc.). Please stop taking this medication if you experience: weakness, itching, yellow skin or eyes, joint pains, vomiting blood, blood or black stools, unusual weight gain, or swelling in your arms, legs, hands, or feet.     Please return to the Emergency Department for any new or worsening symptoms including: fever, chest pain, shortness of breath, loss of consciousness, dizziness, weakness, or any other concerns.     Please follow up with your Primary Care Provider within in the week. If you do not have one, you may contact the one listed on your discharge paperwork or you may also call the [facility] Appointment Desk at [phone] to schedule an appointment with one.     Please take all medication as prescribed.

## 2020-09-21 ENCOUNTER — TELEPHONE (OUTPATIENT)
Dept: NEUROLOGY | Facility: CLINIC | Age: 65
End: 2020-09-21

## 2020-09-21 DIAGNOSIS — M54.12 CERVICAL RADICULOPATHY: Primary | ICD-10-CM

## 2020-09-21 RX ORDER — PREGABALIN 150 MG/1
150 CAPSULE ORAL 2 TIMES DAILY
Qty: 60 CAPSULE | Refills: 2 | Status: SHIPPED | OUTPATIENT
Start: 2020-09-21 | End: 2022-01-13 | Stop reason: SDUPTHER

## 2021-01-08 ENCOUNTER — TELEPHONE (OUTPATIENT)
Dept: NEUROLOGY | Facility: CLINIC | Age: 66
End: 2021-01-08

## 2021-01-22 ENCOUNTER — TELEPHONE (OUTPATIENT)
Dept: NEUROLOGY | Facility: CLINIC | Age: 66
End: 2021-01-22

## 2021-02-02 NOTE — PROGRESS NOTES
Chief Complaint   Patient presents with    Stroke        Grazyna Durand is a 65 y.o. female with a history of multiple medical diagnoses as listed below that is establishing care after a history of stroke in February 2020.  She presented to the hospital at that time with complaints of worsening of her baseline left-sided weakness.  Weakness was residual after basal ganglia hemorrhage in October 2019.  On the day of presentation she woke up in noticed tongue heaviness, left-sided weakness, and difficulty with speech that was reminiscent of her prior episode.  She went to the emergency room for evaluation where CT and CTA were done.  She was managed in the hospital.  MRI was unremarkable for any new events and her symptoms were felt to be due to recrudescence of prior infarction.  Since leaving the hospital she has been taking her medication as prescribed including amlodipine for hypertension, enteric-coated aspirin for secondary stroke prevention in addition to statin.  Blood pressures have been well controlled.  She is no longer drinking.  She still has some shoulder pain and restricted range of motion in the left arm, that feels like it is stagnant.  Gabapentin has been helpful with her pain.  She has been having decreased ability to sleep at night.  Although lying in the bed in a dark room she feels that she has been unable to fall asleep quickly or consistently.    Interval history  07/30/2020  CTs have difficulty with headaches.  She has also been having increasing difficulty with her sleeping which she feels has been a big trigger for her headaches.  Medications have been taking this point as directed without any complaints of side effects.  She has tried various medications to help , but none have offer significant improvement nor have they allowed her to go to sleep quickly stay asleep throughout the night.    08/19/2020  Headaches have been slightly better compared to last visit.  Medications have been taking  PRESCRIPTION REFILL POLICY [facility] Statement to Patients April 1, 2014 In an effort to ensure the large volume of patient prescription refills is processed in the most efficient and expeditious manner, we are asking our patients to assist us by calling your Pharmacy for all prescription refills, this will include also your  Mail Order Pharmacy. The pharmacy will contact our office electronically to continue the refill process. Please do not wait until the last minute to call your pharmacy. We need at least 48 hours (2days) to fill prescriptions. We also encourage you to call your pharmacy before going to  your prescription to make sure it is ready. With regard to controlled substance prescription refill requests (narcotic refills) that need to be picked up at our office, we ask your cooperation by providing us with at least 72 hours (3days) notice that you will need a refill. We will not refill narcotic prescription refill requests after 4:00pm on any weekday, Monday through Thursday, or after 2:00pm on Fridays, or on the weekends. We encourage everyone to explore another way of getting your prescription refill request processed using [employer], our patient web portal through our electronic medical record system. [employer] is an efficient and effective way to communicate your medication request directly to the office and  downloadable as an joshua on your smart phone . [employer] also features a review functionality that allows you to view your medication list as well as leave messages for your physician. Are you ready to get connected? If so please review the attatched instructions or speak to any of our staff to get you set up right away! Thank you so much for your cooperation. Should you have any questions please contact our Practice Administrator. The Physicians and Staff,  [facility] directed complaints of side effects.  She feels that her blood pressures have been well controlled on her current medication regimen.    PAST MEDICAL HISTORY:  Past Medical History:   Diagnosis Date    Chronic pain     left arm    High cholesterol     Hypertension     Stroke     Octopber 2019 left arm weakness with pain       PAST SURGICAL HISTORY:  Past Surgical History:   Procedure Laterality Date    BUNIONECTOMY      LEG TENDON SURGERY Right        SOCIAL HISTORY:  Social History     Socioeconomic History    Marital status: Single     Spouse name: Not on file    Number of children: Not on file    Years of education: Not on file    Highest education level: Not on file   Occupational History    Not on file   Social Needs    Financial resource strain: Not on file    Food insecurity     Worry: Not on file     Inability: Not on file    Transportation needs     Medical: Not on file     Non-medical: Not on file   Tobacco Use    Smoking status: Never Smoker    Smokeless tobacco: Never Used   Substance and Sexual Activity    Alcohol use: Not Currently     Comment: quit 10/19    Drug use: No    Sexual activity: Never   Lifestyle    Physical activity     Days per week: Not on file     Minutes per session: Not on file    Stress: Not on file   Relationships    Social connections     Talks on phone: Not on file     Gets together: Not on file     Attends Denominational service: Not on file     Active member of club or organization: Not on file     Attends meetings of clubs or organizations: Not on file     Relationship status: Not on file   Other Topics Concern    Not on file   Social History Narrative    Not on file       FAMILY HISTORY:  Family History   Problem Relation Age of Onset    Cancer Mother     No Known Problems Father        ALLERGIES AND MEDICATIONS: updated and reviewed.  Review of patient's allergies indicates:   Allergen Reactions    Benadryl [diphenhydramine hcl]      cuase her bp to  elevate    Prednisone Other (See Comments)     Reactions:  Increased blood pressure    Penicillins      Current Outpatient Medications   Medication Sig Dispense Refill    amLODIPine (NORVASC) 5 MG tablet Take 1 tablet (5 mg total) by mouth once daily. 30 tablet 11    aspirin (ECOTRIN) 81 MG EC tablet Take 1 tablet (81 mg total) by mouth once daily. 90 tablet 3    atorvastatin (LIPITOR) 40 MG tablet Take 1 tablet (40 mg total) by mouth once daily. 90 tablet 3    cetirizine (ZYRTEC) 10 MG tablet Take 1 tablet (10 mg total) by mouth once daily. 30 tablet 1    folic acid (FOLVITE) 1 MG tablet Take 1 mg by mouth once daily.      gabapentin (NEURONTIN) 300 MG capsule Take 2 capsules (600 mg total) by mouth 2 (two) times daily AND 3 capsules (900 mg total) every evening. 210 capsule 11    lemborexant (DAYVIGO) 5 mg Tab Take 5 mg by mouth nightly as needed (insomnia). 30 tablet 2    losartan (COZAAR) 50 MG tablet Take 50 mg by mouth once daily.      omeprazole (PRILOSEC) 10 MG capsule Take 1 capsule (10 mg total) by mouth every morning. 30 capsule 11    pantoprazole (PROTONIX) 40 MG tablet Take 1 tablet (40 mg total) by mouth once daily. 30 tablet 3    traMADol (ULTRAM) 50 mg tablet Take 1 tablet (50 mg total) by mouth every 6 (six) hours as needed. 20 tablet 0    traZODone (DESYREL) 50 MG tablet Take 2 tablets (100 mg total) by mouth every evening. 60 tablet 11     No current facility-administered medications for this visit.        Review of Systems   Constitutional: Negative for activity change, appetite change, fever and unexpected weight change.   HENT: Negative for trouble swallowing and voice change.    Eyes: Negative for photophobia and visual disturbance.   Respiratory: Negative for apnea and shortness of breath.    Cardiovascular: Negative for chest pain and leg swelling.   Gastrointestinal: Negative for constipation and nausea.   Genitourinary: Negative for difficulty urinating.   Musculoskeletal:  Positive for arthralgias and myalgias. Negative for back pain, gait problem and neck pain.   Skin: Negative for color change and pallor.   Neurological: Positive for weakness. Negative for dizziness, seizures, syncope and numbness.   Hematological: Negative for adenopathy.   Psychiatric/Behavioral: Negative for agitation, confusion and decreased concentration.       Neurologic Exam     Mental Status   Oriented to person, place, and time.   Registration: recalls 3 of 3 objects.   Attention: normal. Concentration: normal.   Speech: speech is normal   Level of consciousness: alert  Knowledge: good.     Cranial Nerves     CN II   Visual fields full to confrontation.   Right visual field deficit: none  Left visual field deficit: none     CN III, IV, VI   Extraocular motions are normal.   Right pupil: Size: 3 mm. Shape: regular. Accommodation: intact.   Left pupil: Size: 3 mm. Shape: regular. Accommodation: intact.   CN III: no CN III palsy  CN VI: no CN VI palsy  Nystagmus: none   Diplopia: none  Ophthalmoparesis: none  Upgaze: normal  Downgaze: normal  Conjugate gaze: present    CN V   Facial sensation intact.   Right facial sensation deficit: none  Left facial sensation deficit: none    CN VII   Facial expression full, symmetric.   Right facial weakness: none  Left facial weakness: none    CN VIII   CN VIII normal.     CN IX, X   CN IX normal.   CN X normal.   Palate: symmetric    CN XI   CN XI normal.   Right sternocleidomastoid strength: normal  Left sternocleidomastoid strength: normal  Right trapezius strength: normal  Left trapezius strength: normal    CN XII   CN XII normal.   Tongue deviation: none    Motor Exam   Muscle bulk: normal  Overall muscle tone: normal  Right arm tone: normal  Left arm tone: normal  Right leg tone: normal  Left leg tone: normal    Strength   Strength 5/5 throughout.     Gait, Coordination, and Reflexes     Gait  Gait: normal    Coordination   Finger to nose coordination: normal  Heel  "to shin coordination: normal    Tremor   Resting tremor: absent    Reflexes   Right brachioradialis: 2+  Left brachioradialis: 2+  Right biceps: 2+  Left biceps: 2+  Right triceps: 2+  Left triceps: 2+  Right patellar: 2+  Left patellar: 2+  Right achilles: 2+  Left achilles: 2+      Physical Exam  Vitals signs reviewed.   Constitutional:       Appearance: She is well-developed.   HENT:      Head: Normocephalic and atraumatic.   Eyes:      Extraocular Movements: EOM normal.   Neck:      Musculoskeletal: Normal range of motion.   Pulmonary:      Effort: Pulmonary effort is normal. No respiratory distress.   Musculoskeletal: Normal range of motion.   Neurological:      Mental Status: She is alert and oriented to person, place, and time.      Coordination: Finger-Nose-Finger Test and Heel to Shin Test normal.      Gait: Gait is intact.      Deep Tendon Reflexes: Strength normal.      Reflex Scores:       Tricep reflexes are 2+ on the right side and 2+ on the left side.       Bicep reflexes are 2+ on the right side and 2+ on the left side.       Brachioradialis reflexes are 2+ on the right side and 2+ on the left side.       Patellar reflexes are 2+ on the right side and 2+ on the left side.       Achilles reflexes are 2+ on the right side and 2+ on the left side.  Psychiatric:         Speech: Speech normal.         Behavior: Behavior normal.         Thought Content: Thought content normal.         Vitals:    08/19/20 0848   BP: (!) 142/91   BP Location: Right arm   Patient Position: Sitting   BP Method: Large (Automatic)   Pulse: 76   Weight: 71.9 kg (158 lb 8.2 oz)   Height: 5' 1" (1.549 m)       Assessment & Plan:    Problem List Items Addressed This Visit     Essential hypertension    Overview     Blood pressure goal less than 140/90.  Patient has been meeting her goal on current dose of amlodipine 5 mg daily.         Hyperlipidemia    Overview     LDL goal less than 100.  Patient on Lipitor 40 mg daily.  Tolerating " well.         Cerebrovascular accident (CVA) - Primary    Overview     On aspirin and statin for secondary stroke prevention.             More than 50% of this 25 minute encounter was spent in counseling and coordinating care of history of stroke.    Follow-up: Follow up in about 3 months (around 11/19/2020).    This note was done with the assistance of voice recognition software. Some errors may be present after proofreading.

## 2021-02-10 ENCOUNTER — TELEPHONE (OUTPATIENT)
Dept: NEUROLOGY | Facility: CLINIC | Age: 66
End: 2021-02-10

## 2021-02-19 ENCOUNTER — HOSPITAL ENCOUNTER (EMERGENCY)
Facility: HOSPITAL | Age: 66
Discharge: HOME | End: 2021-02-19
Attending: EMERGENCY MEDICINE
Payer: MEDICAID

## 2021-02-19 VITALS
SYSTOLIC BLOOD PRESSURE: 124 MMHG | OXYGEN SATURATION: 96 % | WEIGHT: 165 LBS | DIASTOLIC BLOOD PRESSURE: 70 MMHG | BODY MASS INDEX: 31.15 KG/M2 | RESPIRATION RATE: 16 BRPM | HEIGHT: 61 IN | TEMPERATURE: 99 F | HEART RATE: 71 BPM

## 2021-02-19 DIAGNOSIS — R07.9 CHEST PAIN: ICD-10-CM

## 2021-02-19 LAB
ALBUMIN SERPL BCP-MCNC: 4.3 G/DL (ref 3.5–5.2)
ALP SERPL-CCNC: 93 U/L (ref 55–135)
ALT SERPL W/O P-5'-P-CCNC: 18 U/L (ref 10–44)
ANION GAP SERPL CALC-SCNC: 13 MMOL/L (ref 8–16)
AST SERPL-CCNC: 21 U/L (ref 10–40)
BASOPHILS # BLD AUTO: 0.02 K/UL (ref 0–0.2)
BASOPHILS NFR BLD: 0.3 % (ref 0–1.9)
BILIRUB SERPL-MCNC: 0.9 MG/DL (ref 0.1–1)
BNP SERPL-MCNC: <10 PG/ML (ref 0–99)
BUN SERPL-MCNC: 5 MG/DL (ref 8–23)
CALCIUM SERPL-MCNC: 9.5 MG/DL (ref 8.7–10.5)
CHLORIDE SERPL-SCNC: 104 MMOL/L (ref 95–110)
CO2 SERPL-SCNC: 21 MMOL/L (ref 23–29)
CREAT SERPL-MCNC: 0.8 MG/DL (ref 0.5–1.4)
D DIMER PPP IA.FEU-MCNC: 0.32 MG/L FEU
DIFFERENTIAL METHOD: NORMAL
EOSINOPHIL # BLD AUTO: 0 K/UL (ref 0–0.5)
EOSINOPHIL NFR BLD: 0.3 % (ref 0–8)
ERYTHROCYTE [DISTWIDTH] IN BLOOD BY AUTOMATED COUNT: 12.8 % (ref 11.5–14.5)
EST. GFR  (AFRICAN AMERICAN): >60 ML/MIN/1.73 M^2
EST. GFR  (NON AFRICAN AMERICAN): >60 ML/MIN/1.73 M^2
GLUCOSE SERPL-MCNC: 112 MG/DL (ref 70–110)
HCT VFR BLD AUTO: 42.5 % (ref 37–48.5)
HGB BLD-MCNC: 13.8 G/DL (ref 12–16)
IMM GRANULOCYTES # BLD AUTO: 0.02 K/UL (ref 0–0.04)
IMM GRANULOCYTES NFR BLD AUTO: 0.3 % (ref 0–0.5)
LYMPHOCYTES # BLD AUTO: 1.4 K/UL (ref 1–4.8)
LYMPHOCYTES NFR BLD: 21.3 % (ref 18–48)
MAGNESIUM SERPL-MCNC: 2 MG/DL (ref 1.6–2.6)
MCH RBC QN AUTO: 27.3 PG (ref 27–31)
MCHC RBC AUTO-ENTMCNC: 32.5 G/DL (ref 32–36)
MCV RBC AUTO: 84 FL (ref 82–98)
MONOCYTES # BLD AUTO: 0.5 K/UL (ref 0.3–1)
MONOCYTES NFR BLD: 7.8 % (ref 4–15)
NEUTROPHILS # BLD AUTO: 4.7 K/UL (ref 1.8–7.7)
NEUTROPHILS NFR BLD: 70 % (ref 38–73)
NRBC BLD-RTO: 0 /100 WBC
PLATELET # BLD AUTO: 270 K/UL (ref 150–350)
PMV BLD AUTO: 9.9 FL (ref 9.2–12.9)
POTASSIUM SERPL-SCNC: 3.8 MMOL/L (ref 3.5–5.1)
PROT SERPL-MCNC: 7.9 G/DL (ref 6–8.4)
RBC # BLD AUTO: 5.05 M/UL (ref 4–5.4)
SODIUM SERPL-SCNC: 138 MMOL/L (ref 136–145)
TROPONIN I SERPL DL<=0.01 NG/ML-MCNC: <0.006 NG/ML (ref 0–0.03)
TROPONIN I SERPL DL<=0.01 NG/ML-MCNC: <0.006 NG/ML (ref 0–0.03)
WBC # BLD AUTO: 6.67 K/UL (ref 3.9–12.7)

## 2021-02-19 PROCEDURE — 84484 ASSAY OF TROPONIN QUANT: CPT | Mod: 91

## 2021-02-19 PROCEDURE — 84484 ASSAY OF TROPONIN QUANT: CPT

## 2021-02-19 PROCEDURE — 93010 EKG 12-LEAD: ICD-10-PCS | Mod: ,,, | Performed by: INTERNAL MEDICINE

## 2021-02-19 PROCEDURE — 83880 ASSAY OF NATRIURETIC PEPTIDE: CPT

## 2021-02-19 PROCEDURE — 25000003 PHARM REV CODE 250: Performed by: PHYSICIAN ASSISTANT

## 2021-02-19 PROCEDURE — 85025 COMPLETE CBC W/AUTO DIFF WBC: CPT

## 2021-02-19 PROCEDURE — 99285 EMERGENCY DEPT VISIT HI MDM: CPT | Mod: 25

## 2021-02-19 PROCEDURE — 80053 COMPREHEN METABOLIC PANEL: CPT

## 2021-02-19 PROCEDURE — 93005 ELECTROCARDIOGRAM TRACING: CPT

## 2021-02-19 PROCEDURE — 83735 ASSAY OF MAGNESIUM: CPT

## 2021-02-19 PROCEDURE — 85379 FIBRIN DEGRADATION QUANT: CPT

## 2021-02-19 PROCEDURE — 93010 ELECTROCARDIOGRAM REPORT: CPT | Mod: ,,, | Performed by: INTERNAL MEDICINE

## 2021-02-19 PROCEDURE — 96374 THER/PROPH/DIAG INJ IV PUSH: CPT

## 2021-02-19 RX ORDER — FAMOTIDINE 10 MG/ML
20 INJECTION INTRAVENOUS
Status: COMPLETED | OUTPATIENT
Start: 2021-02-19 | End: 2021-02-19

## 2021-02-19 RX ORDER — ASPIRIN 325 MG
325 TABLET ORAL
Status: DISCONTINUED | OUTPATIENT
Start: 2021-02-19 | End: 2021-02-19

## 2021-02-19 RX ADMIN — LIDOCAINE HYDROCHLORIDE: 20 SOLUTION ORAL; TOPICAL at 07:02

## 2021-02-19 RX ADMIN — FAMOTIDINE 20 MG: 10 INJECTION INTRAVENOUS at 07:02

## 2021-03-01 ENCOUNTER — TELEPHONE (OUTPATIENT)
Dept: NEUROLOGY | Facility: CLINIC | Age: 66
End: 2021-03-01

## 2021-03-03 ENCOUNTER — LAB VISIT (OUTPATIENT)
Dept: LAB | Facility: HOSPITAL | Age: 66
End: 2021-03-03
Attending: NEUROLOGICAL SURGERY
Payer: MEDICAID

## 2021-03-03 ENCOUNTER — OFFICE VISIT (OUTPATIENT)
Dept: NEUROLOGY | Facility: CLINIC | Age: 66
End: 2021-03-03
Payer: MEDICAID

## 2021-03-03 VITALS
DIASTOLIC BLOOD PRESSURE: 79 MMHG | WEIGHT: 157.44 LBS | HEIGHT: 61 IN | SYSTOLIC BLOOD PRESSURE: 125 MMHG | HEART RATE: 79 BPM | BODY MASS INDEX: 29.72 KG/M2

## 2021-03-03 DIAGNOSIS — I63.9 CEREBROVASCULAR ACCIDENT (CVA), UNSPECIFIED MECHANISM: Primary | ICD-10-CM

## 2021-03-03 DIAGNOSIS — K13.0 CHEILOSIS: ICD-10-CM

## 2021-03-03 DIAGNOSIS — G47.00 INSOMNIA, UNSPECIFIED TYPE: ICD-10-CM

## 2021-03-03 DIAGNOSIS — F41.9 ANXIETY: ICD-10-CM

## 2021-03-03 DIAGNOSIS — R20.0 LEFT SIDED NUMBNESS: ICD-10-CM

## 2021-03-03 LAB — TSH SERPL DL<=0.005 MIU/L-ACNC: 2.12 UIU/ML (ref 0.4–4)

## 2021-03-03 PROCEDURE — 36415 COLL VENOUS BLD VENIPUNCTURE: CPT | Performed by: NEUROLOGICAL SURGERY

## 2021-03-03 PROCEDURE — 99214 OFFICE O/P EST MOD 30 MIN: CPT | Mod: S$PBB,,, | Performed by: NEUROLOGICAL SURGERY

## 2021-03-03 PROCEDURE — 99214 PR OFFICE/OUTPT VISIT, EST, LEVL IV, 30-39 MIN: ICD-10-PCS | Mod: S$PBB,,, | Performed by: NEUROLOGICAL SURGERY

## 2021-03-03 PROCEDURE — 86235 NUCLEAR ANTIGEN ANTIBODY: CPT | Performed by: NEUROLOGICAL SURGERY

## 2021-03-03 PROCEDURE — 99999 PR PBB SHADOW E&M-EST. PATIENT-LVL IV: ICD-10-PCS | Mod: PBBFAC,,, | Performed by: NEUROLOGICAL SURGERY

## 2021-03-03 PROCEDURE — 84443 ASSAY THYROID STIM HORMONE: CPT | Performed by: NEUROLOGICAL SURGERY

## 2021-03-03 PROCEDURE — 84252 ASSAY OF VITAMIN B-2: CPT | Performed by: NEUROLOGICAL SURGERY

## 2021-03-03 PROCEDURE — 84425 ASSAY OF VITAMIN B-1: CPT | Performed by: NEUROLOGICAL SURGERY

## 2021-03-03 PROCEDURE — 86235 NUCLEAR ANTIGEN ANTIBODY: CPT | Mod: 59 | Performed by: NEUROLOGICAL SURGERY

## 2021-03-03 PROCEDURE — 99214 OFFICE O/P EST MOD 30 MIN: CPT | Mod: PBBFAC | Performed by: NEUROLOGICAL SURGERY

## 2021-03-03 PROCEDURE — 99999 PR PBB SHADOW E&M-EST. PATIENT-LVL IV: CPT | Mod: PBBFAC,,, | Performed by: NEUROLOGICAL SURGERY

## 2021-03-03 RX ORDER — AMITRIPTYLINE HYDROCHLORIDE 25 MG/1
25 TABLET, FILM COATED ORAL NIGHTLY
Qty: 30 TABLET | Refills: 11 | Status: SHIPPED | OUTPATIENT
Start: 2021-03-03 | End: 2023-01-23

## 2021-03-04 ENCOUNTER — HOSPITAL ENCOUNTER (EMERGENCY)
Facility: HOSPITAL | Age: 66
Discharge: HOME | End: 2021-03-04
Attending: EMERGENCY MEDICINE
Payer: MEDICAID

## 2021-03-04 VITALS
RESPIRATION RATE: 17 BRPM | DIASTOLIC BLOOD PRESSURE: 72 MMHG | HEIGHT: 61 IN | WEIGHT: 156 LBS | SYSTOLIC BLOOD PRESSURE: 152 MMHG | OXYGEN SATURATION: 100 % | TEMPERATURE: 98 F | BODY MASS INDEX: 29.45 KG/M2 | HEART RATE: 78 BPM

## 2021-03-04 DIAGNOSIS — R68.2 DRY MOUTH: Primary | ICD-10-CM

## 2021-03-04 DIAGNOSIS — K13.0 CRACKED LIPS: ICD-10-CM

## 2021-03-04 PROCEDURE — 99283 EMERGENCY DEPT VISIT LOW MDM: CPT

## 2021-03-04 PROCEDURE — 25000003 PHARM REV CODE 250: Performed by: PHYSICIAN ASSISTANT

## 2021-03-04 RX ORDER — HYDROXYZINE HYDROCHLORIDE 25 MG/1
50 TABLET, FILM COATED ORAL
Status: COMPLETED | OUTPATIENT
Start: 2021-03-04 | End: 2021-03-04

## 2021-03-04 RX ORDER — HYDROXYZINE HYDROCHLORIDE 25 MG/1
50 TABLET, FILM COATED ORAL 3 TIMES DAILY PRN
Status: DISCONTINUED | OUTPATIENT
Start: 2021-03-04 | End: 2021-03-04

## 2021-03-04 RX ORDER — MUPIROCIN 20 MG/G
OINTMENT TOPICAL 3 TIMES DAILY
Qty: 15 G | Refills: 0 | Status: SHIPPED | OUTPATIENT
Start: 2021-03-04 | End: 2022-11-05

## 2021-03-04 RX ADMIN — HYDROXYZINE HYDROCHLORIDE 50 MG: 25 TABLET, FILM COATED ORAL at 09:03

## 2021-03-04 RX ADMIN — LIDOCAINE HYDROCHLORIDE: 20 SOLUTION ORAL; TOPICAL at 08:03

## 2021-03-05 LAB
ANTI-SSB ANTIBODY: 0.05 RATIO (ref 0–0.99)
ANTI-SSB INTERPRETATION: NEGATIVE

## 2021-03-06 LAB
ANTI-SSA ANTIBODY: 0.06 RATIO (ref 0–0.99)
ANTI-SSA INTERPRETATION: NEGATIVE

## 2021-03-08 ENCOUNTER — HOSPITAL ENCOUNTER (EMERGENCY)
Facility: HOSPITAL | Age: 66
Discharge: HOME | End: 2021-03-08
Attending: EMERGENCY MEDICINE
Payer: MEDICAID

## 2021-03-08 ENCOUNTER — TELEPHONE (OUTPATIENT)
Dept: NEUROLOGY | Facility: CLINIC | Age: 66
End: 2021-03-08

## 2021-03-08 VITALS
HEIGHT: 61 IN | BODY MASS INDEX: 29.45 KG/M2 | RESPIRATION RATE: 17 BRPM | WEIGHT: 156 LBS | DIASTOLIC BLOOD PRESSURE: 83 MMHG | TEMPERATURE: 98 F | OXYGEN SATURATION: 97 % | HEART RATE: 83 BPM | SYSTOLIC BLOOD PRESSURE: 140 MMHG

## 2021-03-08 DIAGNOSIS — K13.0 LIP PAIN: ICD-10-CM

## 2021-03-08 DIAGNOSIS — R68.2 MOUTH DRYNESS: Primary | ICD-10-CM

## 2021-03-08 PROCEDURE — 99281 EMR DPT VST MAYX REQ PHY/QHP: CPT

## 2021-03-09 ENCOUNTER — HOSPITAL ENCOUNTER (EMERGENCY)
Facility: HOSPITAL | Age: 66
Discharge: HOME | End: 2021-03-09
Attending: STUDENT IN AN ORGANIZED HEALTH CARE EDUCATION/TRAINING PROGRAM
Payer: MEDICAID

## 2021-03-09 VITALS
HEART RATE: 76 BPM | WEIGHT: 156 LBS | RESPIRATION RATE: 18 BRPM | TEMPERATURE: 98 F | SYSTOLIC BLOOD PRESSURE: 141 MMHG | OXYGEN SATURATION: 98 % | BODY MASS INDEX: 29.45 KG/M2 | HEIGHT: 61 IN | DIASTOLIC BLOOD PRESSURE: 84 MMHG

## 2021-03-09 DIAGNOSIS — B37.0 ORAL CANDIDIASIS: Primary | ICD-10-CM

## 2021-03-09 LAB
VIT B1 BLD-MCNC: 40 UG/L (ref 38–122)
VIT B2 SERPL-MCNC: 4 MCG/L (ref 1–19)

## 2021-03-09 PROCEDURE — 99283 EMERGENCY DEPT VISIT LOW MDM: CPT

## 2021-03-09 RX ORDER — NYSTATIN 100000 [USP'U]/ML
500000 SUSPENSION ORAL 4 TIMES DAILY
Qty: 200 ML | Refills: 0 | Status: SHIPPED | OUTPATIENT
Start: 2021-03-09 | End: 2021-03-19

## 2021-03-10 ENCOUNTER — TELEPHONE (OUTPATIENT)
Dept: NEUROLOGY | Facility: CLINIC | Age: 66
End: 2021-03-10

## 2021-03-12 ENCOUNTER — HOSPITAL ENCOUNTER (EMERGENCY)
Facility: HOSPITAL | Age: 66
Discharge: HOME | End: 2021-03-12
Attending: EMERGENCY MEDICINE
Payer: MEDICAID

## 2021-03-12 VITALS
TEMPERATURE: 99 F | DIASTOLIC BLOOD PRESSURE: 76 MMHG | RESPIRATION RATE: 18 BRPM | SYSTOLIC BLOOD PRESSURE: 130 MMHG | OXYGEN SATURATION: 99 % | HEART RATE: 68 BPM

## 2021-03-12 DIAGNOSIS — R49.9 CHANGE IN VOICE: ICD-10-CM

## 2021-03-12 DIAGNOSIS — R13.10 DYSPHAGIA, UNSPECIFIED TYPE: Primary | ICD-10-CM

## 2021-03-12 DIAGNOSIS — K21.9 GASTROESOPHAGEAL REFLUX DISEASE WITHOUT ESOPHAGITIS: ICD-10-CM

## 2021-03-12 DIAGNOSIS — R07.0 THROAT PAIN: ICD-10-CM

## 2021-03-12 LAB
ALBUMIN SERPL BCP-MCNC: 4.1 G/DL (ref 3.5–5.2)
ALP SERPL-CCNC: 78 U/L (ref 55–135)
ALT SERPL W/O P-5'-P-CCNC: 7 U/L (ref 10–44)
ANION GAP SERPL CALC-SCNC: 10 MMOL/L (ref 8–16)
AST SERPL-CCNC: 14 U/L (ref 10–40)
BASOPHILS # BLD AUTO: 0.01 K/UL (ref 0–0.2)
BASOPHILS NFR BLD: 0.2 % (ref 0–1.9)
BILIRUB SERPL-MCNC: 1.5 MG/DL (ref 0.1–1)
BUN SERPL-MCNC: 8 MG/DL (ref 8–23)
CALCIUM SERPL-MCNC: 9.1 MG/DL (ref 8.7–10.5)
CHLORIDE SERPL-SCNC: 104 MMOL/L (ref 95–110)
CO2 SERPL-SCNC: 25 MMOL/L (ref 23–29)
CREAT SERPL-MCNC: 0.8 MG/DL (ref 0.5–1.4)
DIFFERENTIAL METHOD: NORMAL
EOSINOPHIL # BLD AUTO: 0 K/UL (ref 0–0.5)
EOSINOPHIL NFR BLD: 0.3 % (ref 0–8)
ERYTHROCYTE [DISTWIDTH] IN BLOOD BY AUTOMATED COUNT: 13 % (ref 11.5–14.5)
EST. GFR  (AFRICAN AMERICAN): >60 ML/MIN/1.73 M^2
EST. GFR  (NON AFRICAN AMERICAN): >60 ML/MIN/1.73 M^2
GLUCOSE SERPL-MCNC: 96 MG/DL (ref 70–110)
HCT VFR BLD AUTO: 39.6 % (ref 37–48.5)
HGB BLD-MCNC: 12.9 G/DL (ref 12–16)
IMM GRANULOCYTES # BLD AUTO: 0.01 K/UL (ref 0–0.04)
IMM GRANULOCYTES NFR BLD AUTO: 0.2 % (ref 0–0.5)
LYMPHOCYTES # BLD AUTO: 1.9 K/UL (ref 1–4.8)
LYMPHOCYTES NFR BLD: 29.2 % (ref 18–48)
MCH RBC QN AUTO: 27.4 PG (ref 27–31)
MCHC RBC AUTO-ENTMCNC: 32.6 G/DL (ref 32–36)
MCV RBC AUTO: 84 FL (ref 82–98)
MONOCYTES # BLD AUTO: 0.5 K/UL (ref 0.3–1)
MONOCYTES NFR BLD: 8.2 % (ref 4–15)
NEUTROPHILS # BLD AUTO: 4.1 K/UL (ref 1.8–7.7)
NEUTROPHILS NFR BLD: 61.9 % (ref 38–73)
NRBC BLD-RTO: 0 /100 WBC
PLATELET # BLD AUTO: 235 K/UL (ref 150–350)
PMV BLD AUTO: 10 FL (ref 9.2–12.9)
POCT GLUCOSE: 112 MG/DL (ref 70–110)
POTASSIUM SERPL-SCNC: 3.2 MMOL/L (ref 3.5–5.1)
PROT SERPL-MCNC: 7.4 G/DL (ref 6–8.4)
RBC # BLD AUTO: 4.71 M/UL (ref 4–5.4)
SODIUM SERPL-SCNC: 139 MMOL/L (ref 136–145)
WBC # BLD AUTO: 6.58 K/UL (ref 3.9–12.7)

## 2021-03-12 PROCEDURE — 99285 EMERGENCY DEPT VISIT HI MDM: CPT | Mod: 25

## 2021-03-12 PROCEDURE — 25500020 PHARM REV CODE 255: Performed by: EMERGENCY MEDICINE

## 2021-03-12 PROCEDURE — 25000003 PHARM REV CODE 250: Performed by: EMERGENCY MEDICINE

## 2021-03-12 PROCEDURE — 80053 COMPREHEN METABOLIC PANEL: CPT | Performed by: EMERGENCY MEDICINE

## 2021-03-12 PROCEDURE — 85025 COMPLETE CBC W/AUTO DIFF WBC: CPT | Performed by: EMERGENCY MEDICINE

## 2021-03-12 RX ORDER — OMEPRAZOLE 40 MG/1
40 CAPSULE, DELAYED RELEASE ORAL
Qty: 60 CAPSULE | Refills: 11 | Status: SHIPPED | OUTPATIENT
Start: 2021-03-12 | End: 2022-11-07 | Stop reason: ALTCHOICE

## 2021-03-12 RX ORDER — SUCRALFATE 1 G/10ML
1 SUSPENSION ORAL
Status: COMPLETED | OUTPATIENT
Start: 2021-03-12 | End: 2021-03-12

## 2021-03-12 RX ORDER — FAMOTIDINE 20 MG/1
20 TABLET, FILM COATED ORAL 2 TIMES DAILY
Qty: 60 TABLET | Refills: 11 | Status: SHIPPED | OUTPATIENT
Start: 2021-03-12 | End: 2021-03-12 | Stop reason: SDUPTHER

## 2021-03-12 RX ORDER — OMEPRAZOLE 40 MG/1
40 CAPSULE, DELAYED RELEASE ORAL
Qty: 60 CAPSULE | Refills: 11 | Status: SHIPPED | OUTPATIENT
Start: 2021-03-12 | End: 2021-03-12 | Stop reason: SDUPTHER

## 2021-03-12 RX ORDER — FAMOTIDINE 20 MG/1
20 TABLET, FILM COATED ORAL 2 TIMES DAILY
Qty: 60 TABLET | Refills: 11 | OUTPATIENT
Start: 2021-03-12 | End: 2022-11-05

## 2021-03-12 RX ORDER — SUCRALFATE 1 G/1
1 TABLET ORAL EVERY 6 HOURS PRN
Qty: 30 TABLET | Refills: 11 | Status: SHIPPED | OUTPATIENT
Start: 2021-03-12 | End: 2021-03-12 | Stop reason: SDUPTHER

## 2021-03-12 RX ORDER — SUCRALFATE 1 G/1
1 TABLET ORAL EVERY 6 HOURS PRN
Qty: 30 TABLET | Refills: 11 | Status: SHIPPED | OUTPATIENT
Start: 2021-03-12 | End: 2022-11-05

## 2021-03-12 RX ADMIN — SUCRALFATE 1 G: 1 SUSPENSION ORAL at 11:03

## 2021-03-12 RX ADMIN — IOHEXOL 75 ML: 350 INJECTION, SOLUTION INTRAVENOUS at 09:03

## 2021-03-15 ENCOUNTER — PATIENT OUTREACH (OUTPATIENT)
Dept: EMERGENCY MEDICINE | Facility: HOSPITAL | Age: 66
End: 2021-03-15

## 2021-03-18 ENCOUNTER — HOSPITAL ENCOUNTER (EMERGENCY)
Facility: HOSPITAL | Age: 66
Discharge: HOME | End: 2021-03-18
Attending: EMERGENCY MEDICINE
Payer: MEDICAID

## 2021-03-18 VITALS
BODY MASS INDEX: 29.45 KG/M2 | TEMPERATURE: 99 F | SYSTOLIC BLOOD PRESSURE: 141 MMHG | HEIGHT: 61 IN | HEART RATE: 72 BPM | OXYGEN SATURATION: 98 % | RESPIRATION RATE: 17 BRPM | DIASTOLIC BLOOD PRESSURE: 88 MMHG | WEIGHT: 156 LBS

## 2021-03-18 DIAGNOSIS — R20.2 PARESTHESIA: Primary | ICD-10-CM

## 2021-03-18 DIAGNOSIS — R42 DISEQUILIBRIUM: ICD-10-CM

## 2021-03-18 LAB
ALBUMIN SERPL BCP-MCNC: 4.3 G/DL (ref 3.5–5.2)
ALP SERPL-CCNC: 82 U/L (ref 55–135)
ALT SERPL W/O P-5'-P-CCNC: 8 U/L (ref 10–44)
ANION GAP SERPL CALC-SCNC: 11 MMOL/L (ref 8–16)
AST SERPL-CCNC: 14 U/L (ref 10–40)
BASOPHILS # BLD AUTO: 0.02 K/UL (ref 0–0.2)
BASOPHILS NFR BLD: 0.4 % (ref 0–1.9)
BILIRUB SERPL-MCNC: 1.3 MG/DL (ref 0.1–1)
BUN SERPL-MCNC: 6 MG/DL (ref 8–23)
CALCIUM SERPL-MCNC: 9.8 MG/DL (ref 8.7–10.5)
CHLORIDE SERPL-SCNC: 105 MMOL/L (ref 95–110)
CO2 SERPL-SCNC: 27 MMOL/L (ref 23–29)
CREAT SERPL-MCNC: 0.8 MG/DL (ref 0.5–1.4)
DIFFERENTIAL METHOD: NORMAL
EOSINOPHIL # BLD AUTO: 0 K/UL (ref 0–0.5)
EOSINOPHIL NFR BLD: 0.8 % (ref 0–8)
ERYTHROCYTE [DISTWIDTH] IN BLOOD BY AUTOMATED COUNT: 12.7 % (ref 11.5–14.5)
EST. GFR  (AFRICAN AMERICAN): >60 ML/MIN/1.73 M^2
EST. GFR  (NON AFRICAN AMERICAN): >60 ML/MIN/1.73 M^2
GLUCOSE SERPL-MCNC: 87 MG/DL (ref 70–110)
HCT VFR BLD AUTO: 43.4 % (ref 37–48.5)
HGB BLD-MCNC: 14 G/DL (ref 12–16)
IMM GRANULOCYTES # BLD AUTO: 0.02 K/UL (ref 0–0.04)
IMM GRANULOCYTES NFR BLD AUTO: 0.4 % (ref 0–0.5)
INR PPP: 1.1 (ref 0.8–1.2)
LYMPHOCYTES # BLD AUTO: 1.6 K/UL (ref 1–4.8)
LYMPHOCYTES NFR BLD: 30.8 % (ref 18–48)
MCH RBC QN AUTO: 27.5 PG (ref 27–31)
MCHC RBC AUTO-ENTMCNC: 32.3 G/DL (ref 32–36)
MCV RBC AUTO: 85 FL (ref 82–98)
MONOCYTES # BLD AUTO: 0.4 K/UL (ref 0.3–1)
MONOCYTES NFR BLD: 7.9 % (ref 4–15)
NEUTROPHILS # BLD AUTO: 3.1 K/UL (ref 1.8–7.7)
NEUTROPHILS NFR BLD: 59.7 % (ref 38–73)
NRBC BLD-RTO: 0 /100 WBC
PLATELET # BLD AUTO: 265 K/UL (ref 150–350)
PMV BLD AUTO: 10.5 FL (ref 9.2–12.9)
POCT GLUCOSE: 106 MG/DL (ref 70–110)
POCT GLUCOSE: 97 MG/DL (ref 70–110)
POTASSIUM SERPL-SCNC: 2.9 MMOL/L (ref 3.5–5.1)
PROT SERPL-MCNC: 7.4 G/DL (ref 6–8.4)
PROTHROMBIN TIME: 11.1 SEC (ref 9–12.5)
RBC # BLD AUTO: 5.09 M/UL (ref 4–5.4)
SODIUM SERPL-SCNC: 143 MMOL/L (ref 136–145)
TROPONIN I SERPL DL<=0.01 NG/ML-MCNC: <0.006 NG/ML (ref 0–0.03)
TSH SERPL DL<=0.005 MIU/L-ACNC: 3.48 UIU/ML (ref 0.4–4)
WBC # BLD AUTO: 5.19 K/UL (ref 3.9–12.7)

## 2021-03-18 PROCEDURE — 99283 PR EMERGENCY DEPT VISIT,LEVEL III: ICD-10-PCS | Mod: ,,, | Performed by: PSYCHIATRY & NEUROLOGY

## 2021-03-18 PROCEDURE — 99213 PR OFFICE/OUTPT VISIT, EST, LEVL III, 20-29 MIN: ICD-10-PCS | Mod: 95,,, | Performed by: PSYCHIATRY & NEUROLOGY

## 2021-03-18 PROCEDURE — 93010 EKG 12-LEAD: ICD-10-PCS | Mod: ,,, | Performed by: INTERNAL MEDICINE

## 2021-03-18 PROCEDURE — 99285 EMERGENCY DEPT VISIT HI MDM: CPT | Mod: 25

## 2021-03-18 PROCEDURE — 82962 GLUCOSE BLOOD TEST: CPT

## 2021-03-18 PROCEDURE — 85025 COMPLETE CBC W/AUTO DIFF WBC: CPT | Performed by: EMERGENCY MEDICINE

## 2021-03-18 PROCEDURE — 99283 EMERGENCY DEPT VISIT LOW MDM: CPT | Mod: ,,, | Performed by: PSYCHIATRY & NEUROLOGY

## 2021-03-18 PROCEDURE — 85610 PROTHROMBIN TIME: CPT | Performed by: EMERGENCY MEDICINE

## 2021-03-18 PROCEDURE — 93005 ELECTROCARDIOGRAM TRACING: CPT

## 2021-03-18 PROCEDURE — 84443 ASSAY THYROID STIM HORMONE: CPT | Performed by: EMERGENCY MEDICINE

## 2021-03-18 PROCEDURE — 84484 ASSAY OF TROPONIN QUANT: CPT | Performed by: EMERGENCY MEDICINE

## 2021-03-18 PROCEDURE — 93010 ELECTROCARDIOGRAM REPORT: CPT | Mod: ,,, | Performed by: INTERNAL MEDICINE

## 2021-03-18 PROCEDURE — 80053 COMPREHEN METABOLIC PANEL: CPT | Performed by: EMERGENCY MEDICINE

## 2021-03-18 PROCEDURE — 99213 OFFICE O/P EST LOW 20 MIN: CPT | Mod: 95,,, | Performed by: PSYCHIATRY & NEUROLOGY

## 2021-03-18 PROCEDURE — 83701 LIPOPROTEIN BLD HR FRACTION: CPT | Performed by: EMERGENCY MEDICINE

## 2021-03-19 ENCOUNTER — TELEPHONE (OUTPATIENT)
Dept: OTOLARYNGOLOGY | Facility: CLINIC | Age: 66
End: 2021-03-19

## 2021-03-21 LAB — LDLC SERPL-MCNC: 139 MG/DL

## 2021-03-30 ENCOUNTER — OFFICE VISIT (OUTPATIENT)
Dept: NEUROLOGY | Facility: CLINIC | Age: 66
End: 2021-03-30
Payer: MEDICAID

## 2021-03-30 VITALS
HEIGHT: 61 IN | BODY MASS INDEX: 28.64 KG/M2 | WEIGHT: 151.69 LBS | SYSTOLIC BLOOD PRESSURE: 160 MMHG | HEART RATE: 58 BPM | DIASTOLIC BLOOD PRESSURE: 95 MMHG

## 2021-03-30 DIAGNOSIS — Z86.73 HISTORY OF STROKE: ICD-10-CM

## 2021-03-30 DIAGNOSIS — E78.5 HYPERLIPIDEMIA, UNSPECIFIED HYPERLIPIDEMIA TYPE: ICD-10-CM

## 2021-03-30 DIAGNOSIS — I10 ESSENTIAL HYPERTENSION: Primary | ICD-10-CM

## 2021-03-30 PROCEDURE — 99214 PR OFFICE/OUTPT VISIT, EST, LEVL IV, 30-39 MIN: ICD-10-PCS | Mod: S$PBB,,, | Performed by: NEUROLOGICAL SURGERY

## 2021-03-30 PROCEDURE — 99214 OFFICE O/P EST MOD 30 MIN: CPT | Mod: S$PBB,,, | Performed by: NEUROLOGICAL SURGERY

## 2021-03-30 PROCEDURE — 99999 PR PBB SHADOW E&M-EST. PATIENT-LVL III: ICD-10-PCS | Mod: PBBFAC,,, | Performed by: NEUROLOGICAL SURGERY

## 2021-03-30 PROCEDURE — 99213 OFFICE O/P EST LOW 20 MIN: CPT | Mod: PBBFAC | Performed by: NEUROLOGICAL SURGERY

## 2021-03-30 PROCEDURE — 99999 PR PBB SHADOW E&M-EST. PATIENT-LVL III: CPT | Mod: PBBFAC,,, | Performed by: NEUROLOGICAL SURGERY

## 2021-04-16 ENCOUNTER — PATIENT MESSAGE (OUTPATIENT)
Dept: RESEARCH | Facility: HOSPITAL | Age: 66
End: 2021-04-16

## 2021-07-26 DIAGNOSIS — R20.0 LEFT SIDED NUMBNESS: ICD-10-CM

## 2021-07-26 RX ORDER — GABAPENTIN 300 MG/1
CAPSULE ORAL
Qty: 210 CAPSULE | Refills: 11 | Status: SHIPPED | OUTPATIENT
Start: 2021-07-26 | End: 2022-05-11 | Stop reason: ALTCHOICE

## 2021-07-29 DIAGNOSIS — G47.00 INSOMNIA, UNSPECIFIED TYPE: ICD-10-CM

## 2021-07-30 RX ORDER — TRAZODONE HYDROCHLORIDE 50 MG/1
100 TABLET ORAL NIGHTLY
Qty: 60 TABLET | Refills: 11 | Status: SHIPPED | OUTPATIENT
Start: 2021-07-30 | End: 2022-11-05

## 2021-10-10 ENCOUNTER — HOSPITAL ENCOUNTER (EMERGENCY)
Facility: HOSPITAL | Age: 66
Discharge: HOME | End: 2021-10-10
Attending: EMERGENCY MEDICINE
Payer: MEDICARE

## 2021-10-10 VITALS
SYSTOLIC BLOOD PRESSURE: 140 MMHG | HEART RATE: 75 BPM | DIASTOLIC BLOOD PRESSURE: 78 MMHG | RESPIRATION RATE: 18 BRPM | WEIGHT: 148 LBS | OXYGEN SATURATION: 96 % | TEMPERATURE: 99 F | BODY MASS INDEX: 27.94 KG/M2 | HEIGHT: 61 IN

## 2021-10-10 DIAGNOSIS — U07.1 COVID-19 VIRUS INFECTION: Primary | ICD-10-CM

## 2021-10-10 DIAGNOSIS — R53.1 GENERAL WEAKNESS: ICD-10-CM

## 2021-10-10 LAB
ALBUMIN SERPL BCP-MCNC: 3.6 G/DL (ref 3.5–5.2)
ALP SERPL-CCNC: 50 U/L (ref 55–135)
ALT SERPL W/O P-5'-P-CCNC: 17 U/L (ref 10–44)
ANION GAP SERPL CALC-SCNC: 13 MMOL/L (ref 8–16)
AST SERPL-CCNC: 23 U/L (ref 10–40)
BACTERIA #/AREA URNS HPF: ABNORMAL /HPF
BASOPHILS # BLD AUTO: 0 K/UL (ref 0–0.2)
BASOPHILS NFR BLD: 0 % (ref 0–1.9)
BILIRUB SERPL-MCNC: 1.3 MG/DL (ref 0.1–1)
BILIRUB UR QL STRIP: NEGATIVE
BUN SERPL-MCNC: 12 MG/DL (ref 8–23)
CALCIUM SERPL-MCNC: 9.9 MG/DL (ref 8.7–10.5)
CHLORIDE SERPL-SCNC: 102 MMOL/L (ref 95–110)
CLARITY UR: ABNORMAL
CO2 SERPL-SCNC: 23 MMOL/L (ref 23–29)
COLOR UR: YELLOW
CREAT SERPL-MCNC: 0.8 MG/DL (ref 0.5–1.4)
CTP QC/QA: YES
DIFFERENTIAL METHOD: ABNORMAL
EOSINOPHIL # BLD AUTO: 0 K/UL (ref 0–0.5)
EOSINOPHIL NFR BLD: 0 % (ref 0–8)
ERYTHROCYTE [DISTWIDTH] IN BLOOD BY AUTOMATED COUNT: 13.6 % (ref 11.5–14.5)
EST. GFR  (AFRICAN AMERICAN): >60 ML/MIN/1.73 M^2
EST. GFR  (NON AFRICAN AMERICAN): >60 ML/MIN/1.73 M^2
GLUCOSE SERPL-MCNC: 100 MG/DL (ref 70–110)
GLUCOSE UR QL STRIP: NEGATIVE
HCT VFR BLD AUTO: 43.7 % (ref 37–48.5)
HGB BLD-MCNC: 14.2 G/DL (ref 12–16)
HGB UR QL STRIP: NEGATIVE
HYALINE CASTS #/AREA URNS LPF: 0 /LPF
IMM GRANULOCYTES # BLD AUTO: 0.01 K/UL (ref 0–0.04)
IMM GRANULOCYTES NFR BLD AUTO: 0.2 % (ref 0–0.5)
KETONES UR QL STRIP: NEGATIVE
LEUKOCYTE ESTERASE UR QL STRIP: ABNORMAL
LYMPHOCYTES # BLD AUTO: 0.9 K/UL (ref 1–4.8)
LYMPHOCYTES NFR BLD: 19 % (ref 18–48)
MCH RBC QN AUTO: 28 PG (ref 27–31)
MCHC RBC AUTO-ENTMCNC: 32.5 G/DL (ref 32–36)
MCV RBC AUTO: 86 FL (ref 82–98)
MICROSCOPIC COMMENT: ABNORMAL
MONOCYTES # BLD AUTO: 0.4 K/UL (ref 0.3–1)
MONOCYTES NFR BLD: 9.3 % (ref 4–15)
NEUTROPHILS # BLD AUTO: 3.2 K/UL (ref 1.8–7.7)
NEUTROPHILS NFR BLD: 71.5 % (ref 38–73)
NITRITE UR QL STRIP: NEGATIVE
NRBC BLD-RTO: 0 /100 WBC
PH UR STRIP: 6 [PH] (ref 5–8)
PLATELET # BLD AUTO: 193 K/UL (ref 150–450)
PMV BLD AUTO: 11.5 FL (ref 9.2–12.9)
POCT GLUCOSE: 97 MG/DL (ref 70–110)
POTASSIUM SERPL-SCNC: 3.1 MMOL/L (ref 3.5–5.1)
PROT SERPL-MCNC: 7.6 G/DL (ref 6–8.4)
PROT UR QL STRIP: ABNORMAL
RBC # BLD AUTO: 5.08 M/UL (ref 4–5.4)
RBC #/AREA URNS HPF: 3 /HPF (ref 0–4)
SARS-COV-2 RDRP RESP QL NAA+PROBE: POSITIVE
SODIUM SERPL-SCNC: 138 MMOL/L (ref 136–145)
SP GR UR STRIP: 1.02 (ref 1–1.03)
SQUAMOUS #/AREA URNS HPF: 12 /HPF
TROPONIN I SERPL DL<=0.01 NG/ML-MCNC: 0.01 NG/ML (ref 0–0.03)
URN SPEC COLLECT METH UR: ABNORMAL
UROBILINOGEN UR STRIP-ACNC: ABNORMAL EU/DL
WBC # BLD AUTO: 4.52 K/UL (ref 3.9–12.7)
WBC #/AREA URNS HPF: 12 /HPF (ref 0–5)

## 2021-10-10 PROCEDURE — 84484 ASSAY OF TROPONIN QUANT: CPT | Performed by: PHYSICIAN ASSISTANT

## 2021-10-10 PROCEDURE — U0002 COVID-19 LAB TEST NON-CDC: HCPCS | Performed by: PHYSICIAN ASSISTANT

## 2021-10-10 PROCEDURE — 80053 COMPREHEN METABOLIC PANEL: CPT | Performed by: PHYSICIAN ASSISTANT

## 2021-10-10 PROCEDURE — 93005 ELECTROCARDIOGRAM TRACING: CPT

## 2021-10-10 PROCEDURE — 99285 EMERGENCY DEPT VISIT HI MDM: CPT | Mod: 25

## 2021-10-10 PROCEDURE — 81000 URINALYSIS NONAUTO W/SCOPE: CPT | Performed by: PHYSICIAN ASSISTANT

## 2021-10-10 PROCEDURE — 87086 URINE CULTURE/COLONY COUNT: CPT | Performed by: PHYSICIAN ASSISTANT

## 2021-10-10 PROCEDURE — 85025 COMPLETE CBC W/AUTO DIFF WBC: CPT | Performed by: PHYSICIAN ASSISTANT

## 2021-10-10 PROCEDURE — 82962 GLUCOSE BLOOD TEST: CPT

## 2021-10-10 PROCEDURE — 93010 ELECTROCARDIOGRAM REPORT: CPT | Mod: ,,, | Performed by: INTERNAL MEDICINE

## 2021-10-10 PROCEDURE — 96360 HYDRATION IV INFUSION INIT: CPT

## 2021-10-10 PROCEDURE — 25000003 PHARM REV CODE 250: Performed by: PHYSICIAN ASSISTANT

## 2021-10-10 PROCEDURE — 93010 EKG 12-LEAD: ICD-10-PCS | Mod: ,,, | Performed by: INTERNAL MEDICINE

## 2021-10-10 RX ORDER — ACETAMINOPHEN 325 MG/1
650 TABLET ORAL
Status: COMPLETED | OUTPATIENT
Start: 2021-10-10 | End: 2021-10-10

## 2021-10-10 RX ORDER — DOXYCYCLINE 100 MG/1
100 CAPSULE ORAL 2 TIMES DAILY
Qty: 14 CAPSULE | Refills: 0 | Status: SHIPPED | OUTPATIENT
Start: 2021-10-10 | End: 2021-10-17

## 2021-10-10 RX ADMIN — ACETAMINOPHEN 650 MG: 325 TABLET ORAL at 12:10

## 2021-10-10 RX ADMIN — SODIUM CHLORIDE 1000 ML: 0.9 INJECTION, SOLUTION INTRAVENOUS at 12:10

## 2021-10-11 ENCOUNTER — INFUSION (OUTPATIENT)
Dept: INFECTIOUS DISEASES | Facility: HOSPITAL | Age: 66
End: 2021-10-11
Attending: EMERGENCY MEDICINE
Payer: MEDICARE

## 2021-10-11 VITALS
BODY MASS INDEX: 27.94 KG/M2 | SYSTOLIC BLOOD PRESSURE: 137 MMHG | RESPIRATION RATE: 22 BRPM | OXYGEN SATURATION: 97 % | HEIGHT: 61 IN | TEMPERATURE: 100 F | DIASTOLIC BLOOD PRESSURE: 78 MMHG | WEIGHT: 148 LBS | HEART RATE: 75 BPM

## 2021-10-11 DIAGNOSIS — U07.1 COVID-19 VIRUS INFECTION: ICD-10-CM

## 2021-10-11 PROCEDURE — 63600175 PHARM REV CODE 636 W HCPCS: Performed by: EMERGENCY MEDICINE

## 2021-10-11 PROCEDURE — 25000003 PHARM REV CODE 250: Performed by: EMERGENCY MEDICINE

## 2021-10-11 PROCEDURE — M0243 CASIRIVI AND IMDEVI INFUSION: HCPCS | Performed by: EMERGENCY MEDICINE

## 2021-10-11 RX ORDER — EPINEPHRINE 0.3 MG/.3ML
0.3 INJECTION SUBCUTANEOUS
Status: ACTIVE | OUTPATIENT
Start: 2021-10-11

## 2021-10-11 RX ORDER — DIPHENHYDRAMINE HYDROCHLORIDE 50 MG/ML
25 INJECTION INTRAMUSCULAR; INTRAVENOUS ONCE AS NEEDED
Status: DISCONTINUED | OUTPATIENT
Start: 2021-10-11 | End: 2022-11-05

## 2021-10-11 RX ORDER — ACETAMINOPHEN 325 MG/1
650 TABLET ORAL ONCE AS NEEDED
Status: ACTIVE | OUTPATIENT
Start: 2021-10-11 | End: 2033-03-09

## 2021-10-11 RX ORDER — ONDANSETRON 4 MG/1
4 TABLET, ORALLY DISINTEGRATING ORAL ONCE AS NEEDED
Status: DISCONTINUED | OUTPATIENT
Start: 2021-10-11 | End: 2022-11-05

## 2021-10-11 RX ORDER — SODIUM CHLORIDE 0.9 % (FLUSH) 0.9 %
10 SYRINGE (ML) INJECTION
Status: DISCONTINUED | OUTPATIENT
Start: 2021-10-11 | End: 2022-11-05

## 2021-10-11 RX ORDER — ALBUTEROL SULFATE 90 UG/1
2 AEROSOL, METERED RESPIRATORY (INHALATION)
Status: DISCONTINUED | OUTPATIENT
Start: 2021-10-11 | End: 2022-11-05

## 2021-10-11 RX ADMIN — CASIRIVIMAB AND IMDEVIMAB 600 MG: 600; 600 INJECTION, SOLUTION, CONCENTRATE INTRAVENOUS at 10:10

## 2021-10-12 LAB — BACTERIA UR CULT: NORMAL

## 2022-01-13 ENCOUNTER — OFFICE VISIT (OUTPATIENT)
Dept: NEUROLOGY | Facility: CLINIC | Age: 67
End: 2022-01-13
Payer: MEDICARE

## 2022-01-13 VITALS
DIASTOLIC BLOOD PRESSURE: 103 MMHG | WEIGHT: 146.38 LBS | SYSTOLIC BLOOD PRESSURE: 132 MMHG | BODY MASS INDEX: 27.64 KG/M2 | HEART RATE: 104 BPM | HEIGHT: 61 IN

## 2022-01-13 DIAGNOSIS — M54.12 CERVICAL RADICULOPATHY: ICD-10-CM

## 2022-01-13 PROCEDURE — 3075F SYST BP GE 130 - 139MM HG: CPT | Mod: CPTII,S$GLB,, | Performed by: NEUROLOGICAL SURGERY

## 2022-01-13 PROCEDURE — 1159F PR MEDICATION LIST DOCUMENTED IN MEDICAL RECORD: ICD-10-PCS | Mod: CPTII,S$GLB,, | Performed by: NEUROLOGICAL SURGERY

## 2022-01-13 PROCEDURE — 3008F BODY MASS INDEX DOCD: CPT | Mod: CPTII,S$GLB,, | Performed by: NEUROLOGICAL SURGERY

## 2022-01-13 PROCEDURE — 1101F PR PT FALLS ASSESS DOC 0-1 FALLS W/OUT INJ PAST YR: ICD-10-PCS | Mod: CPTII,S$GLB,, | Performed by: NEUROLOGICAL SURGERY

## 2022-01-13 PROCEDURE — 1159F MED LIST DOCD IN RCRD: CPT | Mod: CPTII,S$GLB,, | Performed by: NEUROLOGICAL SURGERY

## 2022-01-13 PROCEDURE — 3075F PR MOST RECENT SYSTOLIC BLOOD PRESS GE 130-139MM HG: ICD-10-PCS | Mod: CPTII,S$GLB,, | Performed by: NEUROLOGICAL SURGERY

## 2022-01-13 PROCEDURE — 3080F PR MOST RECENT DIASTOLIC BLOOD PRESSURE >= 90 MM HG: ICD-10-PCS | Mod: CPTII,S$GLB,, | Performed by: NEUROLOGICAL SURGERY

## 2022-01-13 PROCEDURE — 99999 PR PBB SHADOW E&M-EST. PATIENT-LVL III: CPT | Mod: PBBFAC,,, | Performed by: NEUROLOGICAL SURGERY

## 2022-01-13 PROCEDURE — 3080F DIAST BP >= 90 MM HG: CPT | Mod: CPTII,S$GLB,, | Performed by: NEUROLOGICAL SURGERY

## 2022-01-13 PROCEDURE — 3288F FALL RISK ASSESSMENT DOCD: CPT | Mod: CPTII,S$GLB,, | Performed by: NEUROLOGICAL SURGERY

## 2022-01-13 PROCEDURE — 1126F PR PAIN SEVERITY QUANTIFIED, NO PAIN PRESENT: ICD-10-PCS | Mod: CPTII,S$GLB,, | Performed by: NEUROLOGICAL SURGERY

## 2022-01-13 PROCEDURE — 99999 PR PBB SHADOW E&M-EST. PATIENT-LVL III: ICD-10-PCS | Mod: PBBFAC,,, | Performed by: NEUROLOGICAL SURGERY

## 2022-01-13 PROCEDURE — 99214 PR OFFICE/OUTPT VISIT, EST, LEVL IV, 30-39 MIN: ICD-10-PCS | Mod: S$GLB,,, | Performed by: NEUROLOGICAL SURGERY

## 2022-01-13 PROCEDURE — 1101F PT FALLS ASSESS-DOCD LE1/YR: CPT | Mod: CPTII,S$GLB,, | Performed by: NEUROLOGICAL SURGERY

## 2022-01-13 PROCEDURE — 1126F AMNT PAIN NOTED NONE PRSNT: CPT | Mod: CPTII,S$GLB,, | Performed by: NEUROLOGICAL SURGERY

## 2022-01-13 PROCEDURE — 3008F PR BODY MASS INDEX (BMI) DOCUMENTED: ICD-10-PCS | Mod: CPTII,S$GLB,, | Performed by: NEUROLOGICAL SURGERY

## 2022-01-13 PROCEDURE — 3288F PR FALLS RISK ASSESSMENT DOCUMENTED: ICD-10-PCS | Mod: CPTII,S$GLB,, | Performed by: NEUROLOGICAL SURGERY

## 2022-01-13 PROCEDURE — 99214 OFFICE O/P EST MOD 30 MIN: CPT | Mod: S$GLB,,, | Performed by: NEUROLOGICAL SURGERY

## 2022-01-13 RX ORDER — PREGABALIN 150 MG/1
150 CAPSULE ORAL 2 TIMES DAILY
Qty: 60 CAPSULE | Refills: 2 | Status: SHIPPED | OUTPATIENT
Start: 2022-01-13 | End: 2022-01-31

## 2022-01-13 NOTE — PROGRESS NOTES
Chief Complaint   Patient presents with    Stroke        Grazyna Durand is a 66 y.o. female with a history of multiple medical diagnoses as listed below that is establishing care after a history of stroke in February 2020.  She presented to the hospital at that time with complaints of worsening of her baseline left-sided weakness.  Weakness was residual after basal ganglia hemorrhage in October 2019.  On the day of presentation she woke up in noticed tongue heaviness, left-sided weakness, and difficulty with speech that was reminiscent of her prior episode.  She went to the emergency room for evaluation where CT and CTA were done.  She was managed in the hospital.  MRI was unremarkable for any new events and her symptoms were felt to be due to recrudescence of prior infarction.  Since leaving the hospital she has been taking her medication as prescribed including amlodipine for hypertension, enteric-coated aspirin for secondary stroke prevention in addition to statin.  Blood pressures have been well controlled.  She is no longer drinking.  She still has some shoulder pain and restricted range of motion in the left arm, that feels like it is stagnant.  Gabapentin has been helpful with her pain.  She has been having decreased ability to sleep at night.  Although lying in the bed in a dark room she feels that she has been unable to fall asleep quickly or consistently.    Interval history  07/30/2020  CTs have difficulty with headaches.  She has also been having increasing difficulty with her sleeping which she feels has been a big trigger for her headaches.  Medications have been taking this point as directed without any complaints of side effects.  She has tried various medications to help , but none have offer significant improvement nor have they allowed her to go to sleep quickly stay asleep throughout the night.    08/19/2020  Headaches have been slightly better compared to last visit.  Medications have been taking  directed complaints of side effects.  She feels that her blood pressures have been well controlled on her current medication regimen.    03/03/2021  She has been having multiple problems since she was last seen in clinic including difficulty with insomnia, irritability,  and pain around the lipsand tongue.  She has been seen by various providers and has had workup including blood work done been to when his was the etiology of her symptoms.  She says that she has been is a much pain with the always on the mouth that she can not eat any food.  She feels that this has been making symptoms.  She has been unable to sleep due to pain and also due to constant irritability.  She says she finds that she is always restless and anxious.    03/30/2021  She says that she has been doing well overall.  She was having headaches but says that since she began to have the symptoms admitted appointments symptoms have overall subsided.  Medications have been taking as directed without any complaints of side effect.  Blood pressure has been well controlled in the 120s over 70 systolic whenever she has checked at home.  She feels that she has been doing very well overall and is pleased with her current progress.    01/13/2022  Says that she continues to have pins and needles sensations in the left foot and she has been having sensations of ants crawling on her left hand.  Symptoms have been present throughout the entire day.  Nothing has made the symptoms any better nor has any been made the symptoms any worse..    PAST MEDICAL HISTORY:  Past Medical History:   Diagnosis Date    Chronic pain     left arm    Stroke     2019       PAST SURGICAL HISTORY:  Past Surgical History:   Procedure Laterality Date    BUNIONECTOMY      LEG TENDON SURGERY Right        SOCIAL HISTORY:  Social History     Socioeconomic History    Marital status: Single   Tobacco Use    Smoking status: Never Smoker    Smokeless tobacco: Never Used   Substance and  Sexual Activity    Alcohol use: Not Currently     Comment: quit 10/19    Drug use: No    Sexual activity: Never       FAMILY HISTORY:  Family History   Problem Relation Age of Onset    Cancer Mother     No Known Problems Father        ALLERGIES AND MEDICATIONS: updated and reviewed.  Review of patient's allergies indicates:   Allergen Reactions    Benadryl [diphenhydramine hcl]      cuase her bp to elevate    Prednisone Other (See Comments)     Reactions:  Increased blood pressure    Asa-calcium carb-mag-aluminum Other (See Comments)     Acid reflux  Acid reflux      Penicillins     Powder base no.200 Rash     Powder in gloves causes rash on hands, Denies any reaction to latex products     Current Outpatient Medications   Medication Sig Dispense Refill    amitriptyline (ELAVIL) 25 MG tablet Take 1 tablet (25 mg total) by mouth every evening. Begin with one half tablet nightly for one week. 30 tablet 11    amLODIPine (NORVASC) 5 MG tablet Take 1 tablet (5 mg total) by mouth once daily. (Patient taking differently: Take 10 mg by mouth once daily. ) 30 tablet 11    atorvastatin (LIPITOR) 40 MG tablet Take 1 tablet (40 mg total) by mouth once daily. 90 tablet 3    cetirizine (ZYRTEC) 10 MG tablet Take 1 tablet (10 mg total) by mouth once daily. 30 tablet 1    famotidine (PEPCID) 20 MG tablet Take 1 tablet (20 mg total) by mouth 2 (two) times daily. 60 tablet 11    gabapentin (NEURONTIN) 300 MG capsule Take 2 capsules (600 mg total) by mouth 2 (two) times daily AND 3 capsules (900 mg total) every evening. 210 capsule 11    lidocaine (LIDODERM) 5 % Place 1 patch onto the skin once daily. Remove & Discard patch within 12 hours or as directed by MD 15 patch 0    mupirocin (BACTROBAN) 2 % ointment Apply topically 3 (three) times daily. 15 g 0    omeprazole (PRILOSEC) 40 MG capsule Take 1 capsule (40 mg total) by mouth 2 (two) times daily before meals. 60 capsule 11    pregabalin (LYRICA) 150 MG capsule  Take 1 capsule (150 mg total) by mouth 2 (two) times daily. 60 capsule 2    sucralfate (CARAFATE) 1 gram tablet Take 1 tablet (1 g total) by mouth every 6 (six) hours as needed (heartburn). 30 tablet 11    traMADoL (ULTRAM) 50 mg tablet Take 1 tablet (50 mg total) by mouth every 6 (six) hours as needed for Pain. 5 tablet 0    traZODone (DESYREL) 50 MG tablet Take 2 tablets (100 mg total) by mouth every evening. 60 tablet 11     Current Facility-Administered Medications   Medication Dose Route Frequency Provider Last Rate Last Admin    acetaminophen tablet 650 mg  650 mg Oral Once PRN CARLOTTA Morales-JUAN DANIEL        albuterol inhaler 2 puff  2 puff Inhalation Q20 Min PRN Emerson Kaplan PA-C        diphenhydrAMINE injection 25 mg  25 mg Intravenous Once PRN CARLOTTA Morales-JUAN DANIEL        EPINEPHrine (EPIPEN) 0.3 mg/0.3 mL pen injection 0.3 mg  0.3 mg Intramuscular PRN Emerson Kaplan PA-C        methylPREDNISolone sodium succinate injection 40 mg  40 mg Intravenous Once PRN CARLOTTA Morales-C        ondansetron disintegrating tablet 4 mg  4 mg Oral Once PRN CARLOTTA Morales-JUAN DANIEL        sodium chloride 0.9% 500 mL flush bag   Intravenous PRN CARLOTTA Morales-JUAN DANIEL        sodium chloride 0.9% flush 10 mL  10 mL Intravenous PRN CARLOTTA Morales-C           Review of Systems   Constitutional: Negative for activity change, appetite change, fever and unexpected weight change.   HENT: Negative for trouble swallowing and voice change.    Eyes: Negative for photophobia and visual disturbance.   Respiratory: Negative for apnea and shortness of breath.    Cardiovascular: Negative for chest pain and leg swelling.   Gastrointestinal: Negative for constipation and nausea.   Genitourinary: Negative for difficulty urinating.   Musculoskeletal: Positive for arthralgias and myalgias. Negative for back pain, gait problem and neck pain.   Skin: Negative for color change and pallor.   Neurological: Positive for  weakness. Negative for dizziness, seizures, syncope and numbness.   Hematological: Negative for adenopathy.   Psychiatric/Behavioral: Negative for agitation, confusion and decreased concentration.       Neurologic Exam     Mental Status   Oriented to person, place, and time.   Registration: recalls 3 of 3 objects.   Attention: normal. Concentration: normal.   Speech: speech is normal   Level of consciousness: alert  Knowledge: good.     Cranial Nerves     CN II   Visual fields full to confrontation.   Right visual field deficit: none  Left visual field deficit: none     CN III, IV, VI   Extraocular motions are normal.   Right pupil: Size: 3 mm. Shape: regular. Accommodation: intact.   Left pupil: Size: 3 mm. Shape: regular. Accommodation: intact.   CN III: no CN III palsy  CN VI: no CN VI palsy  Nystagmus: none   Diplopia: none  Ophthalmoparesis: none  Upgaze: normal  Downgaze: normal  Conjugate gaze: present    CN V   Facial sensation intact.   Right facial sensation deficit: none  Left facial sensation deficit: none    CN VII   Facial expression full, symmetric.   Right facial weakness: none  Left facial weakness: none    CN VIII   CN VIII normal.     CN IX, X   CN IX normal.   CN X normal.   Palate: symmetric    CN XI   CN XI normal.   Right sternocleidomastoid strength: normal  Left sternocleidomastoid strength: normal  Right trapezius strength: normal  Left trapezius strength: normal    CN XII   CN XII normal.   Tongue deviation: none    Motor Exam   Muscle bulk: normal  Overall muscle tone: normal  Right arm tone: normal  Left arm tone: normal  Right leg tone: normal  Left leg tone: normal    Strength   Strength 5/5 throughout.     Gait, Coordination, and Reflexes     Gait  Gait: normal    Coordination   Finger to nose coordination: normal  Heel to shin coordination: normal    Tremor   Resting tremor: absent    Reflexes   Right brachioradialis: 2+  Left brachioradialis: 2+  Right biceps: 2+  Left biceps:  "2+  Right triceps: 2+  Left triceps: 2+  Right patellar: 2+  Left patellar: 2+  Right achilles: 2+  Left achilles: 2+      Physical Exam  Vitals reviewed.   Constitutional:       Appearance: She is well-developed.   HENT:      Head: Normocephalic and atraumatic.   Eyes:      Extraocular Movements: EOM normal.   Pulmonary:      Effort: Pulmonary effort is normal. No respiratory distress.   Musculoskeletal:         General: Normal range of motion.      Cervical back: Normal range of motion.   Neurological:      Mental Status: She is alert and oriented to person, place, and time.      Coordination: Finger-Nose-Finger Test and Heel to Shin Test normal.      Gait: Gait is intact.      Deep Tendon Reflexes: Strength normal.      Reflex Scores:       Tricep reflexes are 2+ on the right side and 2+ on the left side.       Bicep reflexes are 2+ on the right side and 2+ on the left side.       Brachioradialis reflexes are 2+ on the right side and 2+ on the left side.       Patellar reflexes are 2+ on the right side and 2+ on the left side.       Achilles reflexes are 2+ on the right side and 2+ on the left side.  Psychiatric:         Speech: Speech normal.         Behavior: Behavior normal.         Thought Content: Thought content normal.         Vitals:    01/13/22 1053   BP: (!) 132/103   Pulse: 104   Weight: 66.4 kg (146 lb 6.2 oz)   Height: 5' 1" (1.549 m)       Assessment & Plan:    Problem List Items Addressed This Visit    None     Visit Diagnoses     Cervical radiculopathy        Relevant Medications    pregabalin (LYRICA) 150 MG capsule          Follow-up: No follow-ups on file.    This note was done with the assistance of voice recognition software. Some errors may be present after proofreading.                "

## 2022-01-24 ENCOUNTER — TELEPHONE (OUTPATIENT)
Dept: NEUROLOGY | Facility: CLINIC | Age: 67
End: 2022-01-24
Payer: MEDICARE

## 2022-01-24 NOTE — TELEPHONE ENCOUNTER
----- Message from Reny Santiago sent at 1/24/2022  9:07 AM CST -----  Regarding: medication concerns  Name of Who is Calling: Grazyna           What is the request in detail: Patient is requesting a call back in regards to the Lyrica 150mg that she said is to strong for her.           Can the clinic reply by [facility]: No           What Number [facility] Back [facility] [phone]    The medication makes her extremely dizzy. She is unable to function and it makes her very jittery

## 2022-01-25 ENCOUNTER — TELEPHONE (OUTPATIENT)
Dept: NEUROLOGY | Facility: CLINIC | Age: 67
End: 2022-01-25
Payer: MEDICARE

## 2022-01-25 NOTE — TELEPHONE ENCOUNTER
----- Message from Ashly Malloy MA sent at 1/24/2022  4:40 PM CST -----  Message  Received: Today  Tawny Gonzalez Staff  Caller: Unspecified (Today,  4:24 PM)   Name of Who is Calling:     What is the request in detail:   patient request call back in reference to want to know if called different medication   / patient state  medication she was given is   to strong  Please contact to further discuss and advise       Can the clinic reply by [facility]:     What [facility]:  [phone]       She stopped the medication 3 days ago. Can you send something different in for her

## 2022-01-28 ENCOUNTER — TELEPHONE (OUTPATIENT)
Dept: NEUROLOGY | Facility: CLINIC | Age: 67
End: 2022-01-28
Payer: MEDICARE

## 2022-01-28 DIAGNOSIS — M54.12 CERVICAL RADICULOPATHY: Primary | ICD-10-CM

## 2022-01-28 RX ORDER — DULOXETIN HYDROCHLORIDE 30 MG/1
30 CAPSULE, DELAYED RELEASE ORAL DAILY
Qty: 30 CAPSULE | Refills: 11 | Status: SHIPPED | OUTPATIENT
Start: 2022-01-28 | End: 2022-05-11 | Stop reason: ALTCHOICE

## 2022-01-28 NOTE — TELEPHONE ENCOUNTER
----- Message from Mariela Cavazos sent at 1/28/2022  2:29 PM CST -----  Contact: Patient [phone]  Type: Patient Call Back    Who called: patient     What is the request in detail: Waiting on a call back in regards to medication. Patient states that pregabalin (LYRICA) 150 MG capsule was too strong and she was supposed to be prescribed something else. Patient states she's been waiting for 5 days and doesn't want to go the weekend without the medication. Need to speak to nurse. Please call.   .  [facility] - [id number] - [place], [place] - [address]  [address]  [facility] [address]  Phone: [phone] Fax: [phone]    Would the patient rather a call back or a response via [facility]? Call back    Best call back number: [phone]           Pt biba from home c/o pain to the lower back abdomen state that he was w/o an aide for 4 days hx paraplegic wheelchair bound

## 2022-01-31 ENCOUNTER — TELEPHONE (OUTPATIENT)
Dept: NEUROLOGY | Facility: CLINIC | Age: 67
End: 2022-01-31
Payer: MEDICARE

## 2022-01-31 DIAGNOSIS — M54.12 CERVICAL RADICULOPATHY: ICD-10-CM

## 2022-01-31 RX ORDER — PREGABALIN 50 MG/1
50 CAPSULE ORAL 3 TIMES DAILY
Qty: 60 CAPSULE | Refills: 2 | Status: SHIPPED | OUTPATIENT
Start: 2022-01-31 | End: 2022-05-11

## 2022-01-31 NOTE — TELEPHONE ENCOUNTER
----- Message from Enriqueta Gonzalez sent at 1/31/2022  9:17 AM CST -----  Regarding: medication  Name of Who is Calling: SHILO MOYA [[id number]]      What is the request in detail: Patient is requesting a call back concerning some medication she was given she states then her pcp lowered the dosage she needs to discuss it with someone       Can the clinic reply by [facility]: no      What [facility]: [phone]      She went to the ENT Dr and she gave her 50 mg pregabalin and it seems to be working but she only gave her 12 until she could speak to you. Can you send in a prescription of the 50 mg 3 times a day is how she's taking it now

## 2022-03-28 ENCOUNTER — TELEPHONE (OUTPATIENT)
Dept: NEUROLOGY | Facility: CLINIC | Age: 67
End: 2022-03-28
Payer: MEDICARE

## 2022-03-28 NOTE — TELEPHONE ENCOUNTER
----- Message from Salome Matthew sent at 3/28/2022 11:03 AM CDT -----  Regarding: self  .Type: Patient Call Back    Who called: self     What is the request in detail:pregabalin (LYRICA) 50 MG capsule states the patient has been taking as she should for a few months and it is not working. Please advise.     Can the clinic reply by [facility]?    Would the patient rather a call back or a response via [facility]? Call     Best call back number: .[phone]      Additional Information:     [facility] - [id number] - [place], [place] - [address]  [address]  [facility] [address]  Phone: [phone] Fax: [phone]

## 2022-03-29 NOTE — TELEPHONE ENCOUNTER
Called and spoke with patient.  Informed her to take 3 tablets twice a day oer Dr Curran to see if the increase would help.  Patient verbalized understanding.

## 2022-04-01 ENCOUNTER — TELEPHONE (OUTPATIENT)
Dept: NEUROLOGY | Facility: CLINIC | Age: 67
End: 2022-04-01
Payer: MEDICARE

## 2022-04-01 ENCOUNTER — HOSPITAL ENCOUNTER (EMERGENCY)
Facility: HOSPITAL | Age: 67
Discharge: HOME | End: 2022-04-01
Attending: EMERGENCY MEDICINE
Payer: MEDICARE

## 2022-04-01 VITALS
SYSTOLIC BLOOD PRESSURE: 152 MMHG | OXYGEN SATURATION: 97 % | HEIGHT: 61 IN | RESPIRATION RATE: 16 BRPM | DIASTOLIC BLOOD PRESSURE: 84 MMHG | HEART RATE: 72 BPM | WEIGHT: 148 LBS | BODY MASS INDEX: 27.94 KG/M2 | TEMPERATURE: 98 F

## 2022-04-01 DIAGNOSIS — M79.603 ARM PAIN: Primary | ICD-10-CM

## 2022-04-01 LAB
ALBUMIN SERPL BCP-MCNC: 4.1 G/DL (ref 3.5–5.2)
ALP SERPL-CCNC: 62 U/L (ref 55–135)
ALT SERPL W/O P-5'-P-CCNC: 22 U/L (ref 10–44)
ANION GAP SERPL CALC-SCNC: 7 MMOL/L (ref 8–16)
AST SERPL-CCNC: 33 U/L (ref 10–40)
BASOPHILS # BLD AUTO: 0.02 K/UL (ref 0–0.2)
BASOPHILS NFR BLD: 0.4 % (ref 0–1.9)
BILIRUB SERPL-MCNC: 1 MG/DL (ref 0.1–1)
BUN SERPL-MCNC: 8 MG/DL (ref 8–23)
CALCIUM SERPL-MCNC: 9.7 MG/DL (ref 8.7–10.5)
CHLORIDE SERPL-SCNC: 108 MMOL/L (ref 95–110)
CO2 SERPL-SCNC: 25 MMOL/L (ref 23–29)
CREAT SERPL-MCNC: 0.8 MG/DL (ref 0.5–1.4)
DIFFERENTIAL METHOD: ABNORMAL
EOSINOPHIL # BLD AUTO: 0 K/UL (ref 0–0.5)
EOSINOPHIL NFR BLD: 0.6 % (ref 0–8)
ERYTHROCYTE [DISTWIDTH] IN BLOOD BY AUTOMATED COUNT: 13.8 % (ref 11.5–14.5)
EST. GFR  (AFRICAN AMERICAN): >60 ML/MIN/1.73 M^2
EST. GFR  (NON AFRICAN AMERICAN): >60 ML/MIN/1.73 M^2
GLUCOSE SERPL-MCNC: 99 MG/DL (ref 70–110)
HCT VFR BLD AUTO: 43.5 % (ref 37–48.5)
HGB BLD-MCNC: 13.7 G/DL (ref 12–16)
IMM GRANULOCYTES # BLD AUTO: 0.02 K/UL (ref 0–0.04)
IMM GRANULOCYTES NFR BLD AUTO: 0.4 % (ref 0–0.5)
LYMPHOCYTES # BLD AUTO: 1.5 K/UL (ref 1–4.8)
LYMPHOCYTES NFR BLD: 31.7 % (ref 18–48)
MCH RBC QN AUTO: 28 PG (ref 27–31)
MCHC RBC AUTO-ENTMCNC: 31.5 G/DL (ref 32–36)
MCV RBC AUTO: 89 FL (ref 82–98)
MONOCYTES # BLD AUTO: 0.5 K/UL (ref 0.3–1)
MONOCYTES NFR BLD: 10 % (ref 4–15)
NEUTROPHILS # BLD AUTO: 2.7 K/UL (ref 1.8–7.7)
NEUTROPHILS NFR BLD: 56.9 % (ref 38–73)
NRBC BLD-RTO: 0 /100 WBC
PLATELET # BLD AUTO: 235 K/UL (ref 150–450)
PMV BLD AUTO: 10.3 FL (ref 9.2–12.9)
POTASSIUM SERPL-SCNC: 3.7 MMOL/L (ref 3.5–5.1)
PROT SERPL-MCNC: 7.2 G/DL (ref 6–8.4)
RBC # BLD AUTO: 4.89 M/UL (ref 4–5.4)
SODIUM SERPL-SCNC: 140 MMOL/L (ref 136–145)
TROPONIN I SERPL DL<=0.01 NG/ML-MCNC: <0.006 NG/ML (ref 0–0.03)
WBC # BLD AUTO: 4.7 K/UL (ref 3.9–12.7)

## 2022-04-01 PROCEDURE — 99285 EMERGENCY DEPT VISIT HI MDM: CPT | Mod: 25

## 2022-04-01 PROCEDURE — 93005 ELECTROCARDIOGRAM TRACING: CPT

## 2022-04-01 PROCEDURE — 84484 ASSAY OF TROPONIN QUANT: CPT | Performed by: PHYSICIAN ASSISTANT

## 2022-04-01 PROCEDURE — 93010 EKG 12-LEAD: ICD-10-PCS | Mod: ,,, | Performed by: INTERNAL MEDICINE

## 2022-04-01 PROCEDURE — 25000003 PHARM REV CODE 250: Performed by: PHYSICIAN ASSISTANT

## 2022-04-01 PROCEDURE — 93010 ELECTROCARDIOGRAM REPORT: CPT | Mod: ,,, | Performed by: INTERNAL MEDICINE

## 2022-04-01 PROCEDURE — 85025 COMPLETE CBC W/AUTO DIFF WBC: CPT | Performed by: PHYSICIAN ASSISTANT

## 2022-04-01 PROCEDURE — 80053 COMPREHEN METABOLIC PANEL: CPT | Performed by: PHYSICIAN ASSISTANT

## 2022-04-01 RX ORDER — METHOCARBAMOL 750 MG/1
750 TABLET, FILM COATED ORAL
Status: COMPLETED | OUTPATIENT
Start: 2022-04-01 | End: 2022-04-01

## 2022-04-01 RX ORDER — LIDOCAINE 50 MG/G
1 PATCH TOPICAL
Status: DISCONTINUED | OUTPATIENT
Start: 2022-04-01 | End: 2022-04-01 | Stop reason: HOSPADM

## 2022-04-01 RX ORDER — LIDOCAINE 50 MG/G
1 PATCH TOPICAL DAILY PRN
Qty: 300 PATCH | Refills: 0 | Status: SHIPPED | OUTPATIENT
Start: 2022-04-01 | End: 2023-01-23

## 2022-04-01 RX ORDER — METHOCARBAMOL 750 MG/1
750 TABLET, FILM COATED ORAL 3 TIMES DAILY PRN
Qty: 15 TABLET | Refills: 0 | Status: SHIPPED | OUTPATIENT
Start: 2022-04-01 | End: 2022-04-06

## 2022-04-01 RX ADMIN — METHOCARBAMOL 750 MG: 750 TABLET ORAL at 08:04

## 2022-04-01 RX ADMIN — LIDOCAINE 1 PATCH: 50 PATCH TOPICAL at 08:04

## 2022-04-01 NOTE — ED PROVIDER NOTES
Encounter Date: 4/1/2022       History     Chief Complaint   Patient presents with    Abdominal Pain    Shoulder Pain    Arm Pain     Patient reports right shoulder pain that extends down arm to left hand. Patient denies recent trauma, heavy lifting. Pain started 1 week ago, per patient.      Patient is a 66-year-old female with history of a previous stroke who presents to the emergency department with right arm pain.  Patient reports about a week ago she started with pain on the right side of her neck radiating down her right arm.  She states the pain has progressively worsened.  She states the pain is constant.  She describes the pain as a dull aching pain.  She denies any swelling, redness, or warmth.  She denies any rashes or wounds.  She denies any worsening or alleviating factors.  She denies associated chest pain or shortness of breath.  She denies any injury.  She denies fevers or chills.  She denies dizziness or weakness.    The history is provided by the patient.     Review of patient's allergies indicates:   Allergen Reactions    Benadryl [diphenhydramine hcl]      cuase her bp to elevate    Prednisone Other (See Comments)     Reactions:  Increased blood pressure    Asa-calcium carb-mag-aluminum Other (See Comments)     Acid reflux  Acid reflux      Ibuprofen Other (See Comments)     Hx of stroke per patient. Patient  states that she cannot take.    Penicillins     Powder base no.200 Rash     Powder in gloves causes rash on hands, Denies any reaction to latex products     Past Medical History:   Diagnosis Date    Chronic pain     left arm    Stroke     2019     Past Surgical History:   Procedure Laterality Date    BUNIONECTOMY      LEG TENDON SURGERY Right      Family History   Problem Relation Age of Onset    Cancer Mother     No Known Problems Father      Social History     Tobacco Use    Smoking status: Never Smoker    Smokeless tobacco: Never Used   Substance Use Topics    Alcohol  use: Not Currently     Comment: quit 10/19    Drug use: No     Review of Systems   Constitutional: Negative for activity change, appetite change, chills, fatigue and fever.   HENT: Negative for congestion, ear discharge, ear pain, postnasal drip, rhinorrhea, sore throat and trouble swallowing.    Respiratory: Negative for cough and shortness of breath.    Cardiovascular: Negative for chest pain.   Gastrointestinal: Negative for abdominal pain, blood in stool, constipation, diarrhea, nausea and vomiting.   Genitourinary: Negative for dysuria, flank pain and hematuria.   Musculoskeletal: Positive for neck pain. Negative for back pain and neck stiffness.        Arm pain   Skin: Negative for rash and wound.   Neurological: Negative for dizziness, weakness, light-headedness and headaches.       Physical Exam     Initial Vitals [04/01/22 0720]   BP Pulse Resp Temp SpO2   (!) 153/89 75 16 98.4 °F (36.9 °C) 100 %      MAP       --         Physical Exam    Nursing note and vitals reviewed.  Constitutional: She appears well-developed and well-nourished. She is not diaphoretic.  Non-toxic appearance. No distress.   HENT:   Head: Normocephalic.   Right Ear: Hearing and external ear normal.   Left Ear: Hearing and external ear normal.   Nose: Nose normal.   Mouth/Throat: Uvula is midline, oropharynx is clear and moist and mucous membranes are normal. No oropharyngeal exudate.   Eyes: Conjunctivae are normal. Pupils are equal, round, and reactive to light.   Neck: Neck supple.   Normal range of motion.   Full passive range of motion without pain.     Cardiovascular: Normal rate and regular rhythm.   Pulmonary/Chest: Breath sounds normal. No respiratory distress. She has no wheezes. She has no rhonchi. She has no rales. She exhibits no tenderness.   Abdominal: Abdomen is soft. Bowel sounds are normal. There is no abdominal tenderness.   Musculoskeletal:      Right shoulder: Normal.      Right upper arm: Normal.      Right elbow:  Normal.      Cervical back: Full passive range of motion without pain, normal range of motion and neck supple. No rigidity. Muscular tenderness (right paraspinal tenderness) present. No spinous process tenderness. Normal range of motion.     Lymphadenopathy:     She has no cervical adenopathy.   Neurological: She is alert and oriented to person, place, and time.   Skin: Skin is warm and dry. Capillary refill takes less than 2 seconds.   Psychiatric: She has a normal mood and affect.         ED Course   Procedures  Labs Reviewed   CBC W/ AUTO DIFFERENTIAL - Abnormal; Notable for the following components:       Result Value    MCHC 31.5 (*)     All other components within normal limits   COMPREHENSIVE METABOLIC PANEL - Abnormal; Notable for the following components:    Anion Gap 7 (*)     All other components within normal limits   TROPONIN I   TROPONIN I     EKG Readings: (Independently Interpreted)   Initial Reading: No STEMI. Rhythm: Sinus Bradycardia.       Imaging Results          X-Ray Chest PA And Lateral (Final result)  Result time 04/01/22 08:28:51    Final result by Chilango Manning MD (04/01/22 08:28:51)                 Impression:      No detrimental change or radiographic acute intrathoracic process seen.      Electronically signed by: Chilango Manning MD  Date:    04/01/2022  Time:    08:28             Narrative:    EXAMINATION:  XR CHEST PA AND LATERAL    CLINICAL HISTORY:  Pain in arm, unspecified    TECHNIQUE:  PA and lateral views of the chest were performed.    COMPARISON:  Chest radiograph 10/10/2021    FINDINGS:  Patient is slightly rotated.The lungs are well expanded and clear, with normal appearance of pulmonary vasculature and no pleural effusion or pneumothorax.    The cardiac silhouette is normal in size. Grossly similar tortuosity of the aorta.  Hilar contours are within normal limits.    Osseous structures appear stable without acute process seen.                                 Medications    LIDOcaine 5 % patch 1 patch (1 patch Transdermal Patch Applied 4/1/22 7073)   methocarbamoL tablet 750 mg (750 mg Oral Given 4/1/22 0839)     Medical Decision Making:   Initial Assessment:   Urgent evaluation of a 66-year-old female with history of previous stroke who presents to the emergency department with right arm pain.  Patient is afebrile, nontoxic appearing, hemodynamically stable.  No history of injury.  No midline tenderness of spine.  No evidence to suggest vertebral fracture, spinal cord compression, cauda equina syndrome, or spinal cord abscess.  I also do not suspect vertebral vascular etiology.  Patient's presentation is most consistent with radiculopathy type pain.  Will obtain EKG and cardiac labs to ensure no signs of ACS.  Patient's pain has been constant for a week, so I would suspect elevation in troponin if cardiac etiology.  Will give a muscle relaxer and lidocaine patch.  Patient reports she cannot have NSAIDs.  ED Management:  9:31 AM  EKG shows no acute ischemia.  Chest xray reveals no acute cardiopulmonary process.    10:05 AM  Troponin is undetectable.  Pt will be advised to follow up with cardiology outpatient with the nonspecific changes noted on EKG.  Will discharge with lidocaine patch and muscle relaxer.  Pt is advised to also follow up with PCP.  Advised to return to ED with any worsening symptoms or concerns.                      Clinical Impression:   Final diagnoses:  [M79.603] Arm pain (Primary)          ED Disposition Condition    Discharge Stable        ED Prescriptions     None        Follow-up Information    None          Gayla Acevedo PA-C  04/01/22 1007

## 2022-04-01 NOTE — TELEPHONE ENCOUNTER
----- Message from Opal López sent at 4/1/2022 12:18 PM CDT -----  Type:  Patient Returning Call    Who Called: self     Who Left Message for Patient: Ruma    Does the patient know what this is regarding?: yes     Would the patient rather a call back or a response via [facility]? Call back     Best Call Back Number: [phone]

## 2022-04-01 NOTE — TELEPHONE ENCOUNTER
----- Message from Kourtney Brian sent at 4/1/2022 10:41 AM CDT -----  Type: Sooner Appointment Request       Patient is requesting a sooner appointment. Patient declined first available appointment listed as well as another facility and provider. Patient will not accept being placed on the waitlist and is requesting a message be sent to doctor.       When is the first available appointment?  7/8/ nerve issue with hand     Would the patient rather a call back or a response via [facility]? Call Back       Best Call Back Number:  [phone]

## 2022-04-01 NOTE — TELEPHONE ENCOUNTER
Patient went to ED was given Robixin and :idoderm patch that is nt covered by her insurance. Patient wanted to know what you can prescribe anything else that can help with the arm pain.  Patient complaint of slight pain on side iof head.

## 2022-04-04 ENCOUNTER — OFFICE VISIT (OUTPATIENT)
Dept: CARDIOLOGY | Facility: CLINIC | Age: 67
End: 2022-04-04
Payer: MEDICARE

## 2022-04-04 VITALS
HEART RATE: 80 BPM | WEIGHT: 155.63 LBS | SYSTOLIC BLOOD PRESSURE: 150 MMHG | DIASTOLIC BLOOD PRESSURE: 88 MMHG | OXYGEN SATURATION: 99 % | HEIGHT: 61 IN | BODY MASS INDEX: 29.38 KG/M2 | RESPIRATION RATE: 15 BRPM

## 2022-04-04 DIAGNOSIS — K21.9 GASTROESOPHAGEAL REFLUX DISEASE WITHOUT ESOPHAGITIS: ICD-10-CM

## 2022-04-04 DIAGNOSIS — M79.603 ARM PAIN: ICD-10-CM

## 2022-04-04 DIAGNOSIS — I10 ESSENTIAL HYPERTENSION: ICD-10-CM

## 2022-04-04 DIAGNOSIS — Z86.73 HISTORY OF STROKE: ICD-10-CM

## 2022-04-04 DIAGNOSIS — E78.5 HYPERLIPIDEMIA, UNSPECIFIED HYPERLIPIDEMIA TYPE: ICD-10-CM

## 2022-04-04 DIAGNOSIS — G89.29 CHRONIC LEFT SHOULDER PAIN: ICD-10-CM

## 2022-04-04 DIAGNOSIS — R06.02 SOB (SHORTNESS OF BREATH): ICD-10-CM

## 2022-04-04 DIAGNOSIS — R06.09 DOE (DYSPNEA ON EXERTION): Primary | ICD-10-CM

## 2022-04-04 DIAGNOSIS — M25.512 CHRONIC LEFT SHOULDER PAIN: ICD-10-CM

## 2022-04-04 PROCEDURE — 99204 OFFICE O/P NEW MOD 45 MIN: CPT | Mod: S$GLB,,, | Performed by: INTERNAL MEDICINE

## 2022-04-04 PROCEDURE — 3288F FALL RISK ASSESSMENT DOCD: CPT | Mod: CPTII,S$GLB,, | Performed by: INTERNAL MEDICINE

## 2022-04-04 PROCEDURE — 99999 PR PBB SHADOW E&M-EST. PATIENT-LVL V: ICD-10-PCS | Mod: PBBFAC,,, | Performed by: INTERNAL MEDICINE

## 2022-04-04 PROCEDURE — 99204 PR OFFICE/OUTPT VISIT, NEW, LEVL IV, 45-59 MIN: ICD-10-PCS | Mod: S$GLB,,, | Performed by: INTERNAL MEDICINE

## 2022-04-04 PROCEDURE — 1160F PR REVIEW ALL MEDS BY PRESCRIBER/CLIN PHARMACIST DOCUMENTED: ICD-10-PCS | Mod: CPTII,S$GLB,, | Performed by: INTERNAL MEDICINE

## 2022-04-04 PROCEDURE — 3008F BODY MASS INDEX DOCD: CPT | Mod: CPTII,S$GLB,, | Performed by: INTERNAL MEDICINE

## 2022-04-04 PROCEDURE — 99999 PR PBB SHADOW E&M-EST. PATIENT-LVL V: CPT | Mod: PBBFAC,,, | Performed by: INTERNAL MEDICINE

## 2022-04-04 PROCEDURE — 3288F PR FALLS RISK ASSESSMENT DOCUMENTED: ICD-10-PCS | Mod: CPTII,S$GLB,, | Performed by: INTERNAL MEDICINE

## 2022-04-04 PROCEDURE — 1101F PT FALLS ASSESS-DOCD LE1/YR: CPT | Mod: CPTII,S$GLB,, | Performed by: INTERNAL MEDICINE

## 2022-04-04 PROCEDURE — 3077F PR MOST RECENT SYSTOLIC BLOOD PRESSURE >= 140 MM HG: ICD-10-PCS | Mod: CPTII,S$GLB,, | Performed by: INTERNAL MEDICINE

## 2022-04-04 PROCEDURE — 1159F MED LIST DOCD IN RCRD: CPT | Mod: CPTII,S$GLB,, | Performed by: INTERNAL MEDICINE

## 2022-04-04 PROCEDURE — 3008F PR BODY MASS INDEX (BMI) DOCUMENTED: ICD-10-PCS | Mod: CPTII,S$GLB,, | Performed by: INTERNAL MEDICINE

## 2022-04-04 PROCEDURE — 1125F PR PAIN SEVERITY QUANTIFIED, PAIN PRESENT: ICD-10-PCS | Mod: CPTII,S$GLB,, | Performed by: INTERNAL MEDICINE

## 2022-04-04 PROCEDURE — 3079F PR MOST RECENT DIASTOLIC BLOOD PRESSURE 80-89 MM HG: ICD-10-PCS | Mod: CPTII,S$GLB,, | Performed by: INTERNAL MEDICINE

## 2022-04-04 PROCEDURE — 3077F SYST BP >= 140 MM HG: CPT | Mod: CPTII,S$GLB,, | Performed by: INTERNAL MEDICINE

## 2022-04-04 PROCEDURE — 1160F RVW MEDS BY RX/DR IN RCRD: CPT | Mod: CPTII,S$GLB,, | Performed by: INTERNAL MEDICINE

## 2022-04-04 PROCEDURE — 3079F DIAST BP 80-89 MM HG: CPT | Mod: CPTII,S$GLB,, | Performed by: INTERNAL MEDICINE

## 2022-04-04 PROCEDURE — 1159F PR MEDICATION LIST DOCUMENTED IN MEDICAL RECORD: ICD-10-PCS | Mod: CPTII,S$GLB,, | Performed by: INTERNAL MEDICINE

## 2022-04-04 PROCEDURE — 1101F PR PT FALLS ASSESS DOC 0-1 FALLS W/OUT INJ PAST YR: ICD-10-PCS | Mod: CPTII,S$GLB,, | Performed by: INTERNAL MEDICINE

## 2022-04-04 PROCEDURE — 1125F AMNT PAIN NOTED PAIN PRSNT: CPT | Mod: CPTII,S$GLB,, | Performed by: INTERNAL MEDICINE

## 2022-04-04 NOTE — PROGRESS NOTES
CARDIOVASCULAR CONSULTATION    REASON FOR CONSULT:   Grazyna Durand is a 66 y.o. female who presents for      HISTORY OF PRESENT ILLNESS:     Patient is a pleasant 66-year-old lady is [place] to the ER complaining of right arm pain.  Had a stroke a few years ago since then has had left-sided leg weakness.  States that just walking from the parking to the clinic made her short of breath.  Denies any chest pains orthopnea, PND.  But gets short of breath on walking short distances.  EKG done during her ER visit demonstrates sinus rhythm with heart rate of 59 beats per minute.  Patient denies any dizziness or syncopal episodes.      PAST MEDICAL HISTORY:     Past Medical History:   Diagnosis Date    Chronic pain     left arm    Stroke     2019       PAST SURGICAL HISTORY:     Past Surgical History:   Procedure Laterality Date    BUNIONECTOMY      LEG TENDON SURGERY Right        ALLERGIES AND MEDICATION:     Review of patient's allergies indicates:   Allergen Reactions    Benadryl [diphenhydramine hcl]      cuase her bp to elevate    Prednisone Other (See Comments)     Reactions:  Increased blood pressure    Asa-calcium carb-mag-aluminum Other (See Comments)     Acid reflux  Acid reflux      Ibuprofen Other (See Comments)     Hx of stroke per patient. Patient  states that she cannot take.    Penicillins     Powder base no.200 Rash     Powder in gloves causes rash on hands, Denies any reaction to latex products        Medication List          Accurate as of April 4, 2022  2:54 PM. If you have any questions, ask your nurse or doctor.            CHANGE how you take these medications    amLODIPine 5 MG tablet  Commonly known as: NORVASC  Take 1 tablet (5 mg total) by mouth once daily.  What changed: how much to take        CONTINUE taking these medications    amitriptyline 25 MG tablet  Commonly known as: ELAVIL  Take 1 tablet (25 mg total) by mouth every evening. Begin with one half tablet nightly for one week.      atorvastatin 40 MG tablet  Commonly known as: LIPITOR  Take 1 tablet (40 mg total) by mouth once daily.     cetirizine 10 MG tablet  Commonly known as: ZYRTEC  Take 1 tablet (10 mg total) by mouth once daily.     DULoxetine 30 MG capsule  Commonly known as: CYMBALTA  Take 1 capsule (30 mg total) by mouth once daily.     famotidine 20 MG tablet  Commonly known as: PEPCID  Take 1 tablet (20 mg total) by mouth 2 (two) times daily.     gabapentin 300 MG capsule  Commonly known as: NEURONTIN  Take 2 capsules (600 mg total) by mouth 2 (two) times daily AND 3 capsules (900 mg total) every evening.     LIDOcaine 5 %  Commonly known as: LIDODERM  Place 1 patch onto the skin daily as needed (pain). Remove & Discard patch within 12 hours or as directed by MD     methocarbamoL 750 MG Tab  Commonly known as: ROBAXIN  Take 1 tablet (750 mg total) by mouth 3 (three) times daily as needed (muscle pain).     mupirocin 2 % ointment  Commonly known as: BACTROBAN  Apply topically 3 (three) times daily.     omeprazole 40 MG capsule  Commonly known as: PRILOSEC  Take 1 capsule (40 mg total) by mouth 2 (two) times daily before meals.     pregabalin 50 MG capsule  Commonly known as: LYRICA  Take 1 capsule (50 mg total) by mouth 3 (three) times daily.     sucralfate 1 gram tablet  Commonly known as: CARAFATE  Take 1 tablet (1 g total) by mouth every 6 (six) hours as needed (heartburn).     traMADoL 50 mg tablet  Commonly known as: ULTRAM  Take 1 tablet (50 mg total) by mouth every 6 (six) hours as needed for Pain.     traZODone 50 MG tablet  Commonly known as: DESYREL  Take 2 tablets (100 mg total) by mouth every evening.            SOCIAL HISTORY:     Social History     Socioeconomic History    Marital status: Single   Tobacco Use    Smoking status: Never Smoker    Smokeless tobacco: Never Used   Substance and Sexual Activity    Alcohol use: Not Currently     Comment: quit 10/19    Drug use: No    Sexual activity: Never  "      FAMILY HISTORY:     Family History   Problem Relation Age of Onset    Cancer Mother     No Known Problems Father        REVIEW OF SYSTEMS:   Review of Systems   Constitutional: Negative.   HENT: Negative.    Eyes: Negative.    Cardiovascular: Positive for dyspnea on exertion.   Respiratory: Negative.    Endocrine: Negative.    Hematologic/Lymphatic: Negative.    Skin: Negative.    Musculoskeletal: Negative.    Gastrointestinal: Negative.    Genitourinary: Negative.    Neurological: Positive for focal weakness.   Psychiatric/Behavioral: Negative.    Allergic/Immunologic: Negative.        A 10 point review of systems was performed and all the pertinent positives have been mentioned. Rest of review of systems was negative.        PHYSICAL EXAM:     Vitals:    04/04/22 1436   BP: (!) 150/88   Pulse: 80   Resp: 15    Body mass index is 29.41 kg/m².  Weight: 70.6 kg (155 lb 10.3 oz)   Height: 5' 1" (154.9 cm)     Physical Exam  Constitutional:       Appearance: Normal appearance. She is well-developed.   HENT:      Head: Normocephalic.   Eyes:      Pupils: Pupils are equal, round, and reactive to light.   Cardiovascular:      Rate and Rhythm: Normal rate and regular rhythm.   Pulmonary:      Effort: Pulmonary effort is normal.      Breath sounds: Normal breath sounds.   Abdominal:      General: Bowel sounds are normal.      Palpations: Abdomen is soft.      Tenderness: There is no abdominal tenderness.   Musculoskeletal:         General: Normal range of motion.      Cervical back: Normal range of motion and neck supple.   Skin:     General: Skin is warm.   Neurological:      Mental Status: She is alert and oriented to person, place, and time.           DATA:     Laboratory:  CBC:  Recent Labs   Lab 03/18/21  1027 10/10/21  1229 04/01/22  0835   WBC 5.19 4.52 4.70   Hemoglobin 14.0 14.2 13.7   Hematocrit 43.4 43.7 43.5   Platelets 265 193 235       CHEMISTRIES:  Recent Labs   Lab 02/11/20  0328 02/19/21  1850 " 03/12/21  2045 03/18/21  1248 10/10/21  1229 04/01/22  0835   Glucose 92 112 H   < > 87 100 99   Sodium 141 138   < > 143 138 140   Potassium 3.8 3.8   < > 2.9 L 3.1 L 3.7   BUN 9 5 L   < > 6 L 12 8   Creatinine 0.7 0.8   < > 0.8 0.8 0.8   eGFR if African American >60 >60   < > >60 >60 >60   eGFR if non African American >60 >60   < > >60 >60 >60   Calcium 10.2 9.5   < > 9.8 9.9 9.7   Magnesium 2.0 2.0  --   --   --   --     < > = values in this interval not displayed.       CARDIAC BIOMARKERS:  Recent Labs   Lab 03/18/21  1248 10/10/21  1229 04/01/22  0835   Troponin I <0.006 0.010 <0.006       COAGS:  Recent Labs   Lab 02/10/20  0945 03/18/21  1248   INR 0.9 1.1       LIPIDS/LFTS:  Recent Labs   Lab 02/10/20  0945 02/19/21  1850 03/18/21  1248 10/10/21  1229 04/01/22  0835   Cholesterol 159  --   --   --   --    Triglycerides 120  --   --   --   --    HDL 45  --   --   --   --    LDL Cholesterol 90.0  --   --   --   --    Non-HDL Cholesterol 114  --   --   --   --    AST 15   < > 14 23 33   ALT 16   < > 8 L 17 22    < > = values in this interval not displayed.       Hemoglobin A1C   Date Value Ref Range Status   03/30/2016 5.4 4.5 - 6.2 % Final       TSH  Recent Labs   Lab 02/10/20  0945 03/03/21  1359 03/18/21  1027   TSH 4.578 H 2.121 3.479       The 10-year ASCVD risk score (Samantha LEO Jr., et al., 2013) is: 9.7%    Values used to calculate the score:      Age: 66 years      Sex: Female      Is Non- : Yes      Diabetic: No      Tobacco smoker: No      Systolic Blood Pressure: 150 mmHg      Is BP treated: No      HDL Cholesterol: 45 mg/dL      Total Cholesterol: 159 mg/dL             ASSESSMENT AND PLAN     Patient Active Problem List   Diagnosis    Essential hypertension    Gastroesophageal reflux disease without esophagitis    Hyperlipidemia    Numbness and tingling of right face    History of stroke    Left shoulder pain       Dyspnea on exertion in a patient with multiple risk  factors for coronary artery disease including stroke.  Will not be able to run on a treadmill because left leg weakness from the stroke.  Further evaluation with the help of a echo, stress test.  Follow-up after testing          Thank you very much for involving me in the care of your patient.  Please do not hesitate to contact me if there are any questions.      Darline Cole MD, FACC, [facility]  Interventional Cardiologist, [facility].           This note was dictated with the help of speech recognition software.  There might be un-intended errors and/or substitutions.

## 2022-05-11 ENCOUNTER — OFFICE VISIT (OUTPATIENT)
Dept: NEUROLOGY | Facility: CLINIC | Age: 67
End: 2022-05-11
Payer: MEDICARE

## 2022-05-11 VITALS
BODY MASS INDEX: 28.26 KG/M2 | WEIGHT: 149.69 LBS | DIASTOLIC BLOOD PRESSURE: 84 MMHG | HEIGHT: 61 IN | HEART RATE: 68 BPM | SYSTOLIC BLOOD PRESSURE: 141 MMHG

## 2022-05-11 DIAGNOSIS — M54.12 CERVICAL RADICULOPATHY: ICD-10-CM

## 2022-05-11 PROCEDURE — 3008F PR BODY MASS INDEX (BMI) DOCUMENTED: ICD-10-PCS | Mod: CPTII,S$GLB,, | Performed by: NEUROLOGICAL SURGERY

## 2022-05-11 PROCEDURE — 99214 PR OFFICE/OUTPT VISIT, EST, LEVL IV, 30-39 MIN: ICD-10-PCS | Mod: S$GLB,,, | Performed by: NEUROLOGICAL SURGERY

## 2022-05-11 PROCEDURE — 1159F PR MEDICATION LIST DOCUMENTED IN MEDICAL RECORD: ICD-10-PCS | Mod: CPTII,S$GLB,, | Performed by: NEUROLOGICAL SURGERY

## 2022-05-11 PROCEDURE — 1101F PR PT FALLS ASSESS DOC 0-1 FALLS W/OUT INJ PAST YR: ICD-10-PCS | Mod: CPTII,S$GLB,, | Performed by: NEUROLOGICAL SURGERY

## 2022-05-11 PROCEDURE — 1126F PR PAIN SEVERITY QUANTIFIED, NO PAIN PRESENT: ICD-10-PCS | Mod: CPTII,S$GLB,, | Performed by: NEUROLOGICAL SURGERY

## 2022-05-11 PROCEDURE — 3008F BODY MASS INDEX DOCD: CPT | Mod: CPTII,S$GLB,, | Performed by: NEUROLOGICAL SURGERY

## 2022-05-11 PROCEDURE — 3077F PR MOST RECENT SYSTOLIC BLOOD PRESSURE >= 140 MM HG: ICD-10-PCS | Mod: CPTII,S$GLB,, | Performed by: NEUROLOGICAL SURGERY

## 2022-05-11 PROCEDURE — 99214 OFFICE O/P EST MOD 30 MIN: CPT | Mod: S$GLB,,, | Performed by: NEUROLOGICAL SURGERY

## 2022-05-11 PROCEDURE — 1159F MED LIST DOCD IN RCRD: CPT | Mod: CPTII,S$GLB,, | Performed by: NEUROLOGICAL SURGERY

## 2022-05-11 PROCEDURE — 3079F PR MOST RECENT DIASTOLIC BLOOD PRESSURE 80-89 MM HG: ICD-10-PCS | Mod: CPTII,S$GLB,, | Performed by: NEUROLOGICAL SURGERY

## 2022-05-11 PROCEDURE — 3079F DIAST BP 80-89 MM HG: CPT | Mod: CPTII,S$GLB,, | Performed by: NEUROLOGICAL SURGERY

## 2022-05-11 PROCEDURE — 1126F AMNT PAIN NOTED NONE PRSNT: CPT | Mod: CPTII,S$GLB,, | Performed by: NEUROLOGICAL SURGERY

## 2022-05-11 PROCEDURE — 3288F PR FALLS RISK ASSESSMENT DOCUMENTED: ICD-10-PCS | Mod: CPTII,S$GLB,, | Performed by: NEUROLOGICAL SURGERY

## 2022-05-11 PROCEDURE — 3288F FALL RISK ASSESSMENT DOCD: CPT | Mod: CPTII,S$GLB,, | Performed by: NEUROLOGICAL SURGERY

## 2022-05-11 PROCEDURE — 1101F PT FALLS ASSESS-DOCD LE1/YR: CPT | Mod: CPTII,S$GLB,, | Performed by: NEUROLOGICAL SURGERY

## 2022-05-11 PROCEDURE — 3077F SYST BP >= 140 MM HG: CPT | Mod: CPTII,S$GLB,, | Performed by: NEUROLOGICAL SURGERY

## 2022-05-11 PROCEDURE — 99999 PR PBB SHADOW E&M-EST. PATIENT-LVL V: CPT | Mod: PBBFAC,,, | Performed by: NEUROLOGICAL SURGERY

## 2022-05-11 PROCEDURE — 99999 PR PBB SHADOW E&M-EST. PATIENT-LVL V: ICD-10-PCS | Mod: PBBFAC,,, | Performed by: NEUROLOGICAL SURGERY

## 2022-05-11 RX ORDER — PREGABALIN 300 MG/1
300 CAPSULE ORAL 2 TIMES DAILY
Qty: 60 CAPSULE | Refills: 2 | Status: SHIPPED | OUTPATIENT
Start: 2022-05-11 | End: 2022-09-13

## 2022-05-11 RX ORDER — VENLAFAXINE HYDROCHLORIDE 37.5 MG/1
37.5 CAPSULE, EXTENDED RELEASE ORAL DAILY
Qty: 30 CAPSULE | Refills: 11 | Status: SHIPPED | OUTPATIENT
Start: 2022-05-11 | End: 2022-11-05

## 2022-05-11 NOTE — PROGRESS NOTES
Chief Complaint   Patient presents with    Numbness        Grazyna Durand is a 66 y.o. female with a history of multiple medical diagnoses as listed below that is establishing care after a history of stroke in February 2020.  She presented to the hospital at that time with complaints of worsening of her baseline left-sided weakness.  Weakness was residual after basal ganglia hemorrhage in October 2019.  On the day of presentation she woke up in noticed tongue heaviness, left-sided weakness, and difficulty with speech that was reminiscent of her prior episode.  She went to the emergency room for evaluation where CT and CTA were done.  She was managed in the hospital.  MRI was unremarkable for any new events and her symptoms were felt to be due to recrudescence of prior infarction.  Since leaving the hospital she has been taking her medication as prescribed including amlodipine for hypertension, enteric-coated aspirin for secondary stroke prevention in addition to statin.  Blood pressures have been well controlled.  She is no longer drinking.  She still has some shoulder pain and restricted range of motion in the left arm, that feels like it is stagnant.  Gabapentin has been helpful with her pain.  She has been having decreased ability to sleep at night.  Although lying in the bed in a dark room she feels that she has been unable to fall asleep quickly or consistently.    Interval history  07/30/2020  CTs have difficulty with headaches.  She has also been having increasing difficulty with her sleeping which she feels has been a big trigger for her headaches.  Medications have been taking this point as directed without any complaints of side effects.  She has tried various medications to help , but none have offer significant improvement nor have they allowed her to go to sleep quickly stay asleep throughout the night.    08/19/2020  Headaches have been slightly better compared to last visit.  Medications have been  taking directed complaints of side effects.  She feels that her blood pressures have been well controlled on her current medication regimen.    03/03/2021  She has been having multiple problems since she was last seen in clinic including difficulty with insomnia, irritability,  and pain around the lipsand tongue.  She has been seen by various providers and has had workup including blood work done been to when his was the etiology of her symptoms.  She says that she has been is a much pain with the always on the mouth that she can not eat any food.  She feels that this has been making symptoms.  She has been unable to sleep due to pain and also due to constant irritability.  She says she finds that she is always restless and anxious.    03/30/2021  She says that she has been doing well overall.  She was having headaches but says that since she began to have the symptoms admitted appointments symptoms have overall subsided.  Medications have been taking as directed without any complaints of side effect.  Blood pressure has been well controlled in the 120s over 70 systolic whenever she has checked at home.  She feels that she has been doing very well overall and is pleased with her current progress.    01/13/2022  Says that she continues to have pins and needles sensations in the left foot and she has been having sensations of ants crawling on her left hand.  Symptoms have been present throughout the entire day.  Nothing has made the symptoms any better nor has any been made the symptoms any worse.    05/25/2022  Despite taking medications as directed, she has been unable to find much if any relief from the pain. Pins and needles sensations have been in the arm travelling from the neck. She not not felt like there has been any noticeable change in her strength.    PAST MEDICAL HISTORY:  Past Medical History:   Diagnosis Date    Chronic pain     left arm    Stroke     2019       PAST SURGICAL HISTORY:  Past Surgical  History:   Procedure Laterality Date    BUNIONECTOMY      LEG TENDON SURGERY Right        SOCIAL HISTORY:  Social History     Socioeconomic History    Marital status: Single   Tobacco Use    Smoking status: Never Smoker    Smokeless tobacco: Never Used   Substance and Sexual Activity    Alcohol use: Not Currently     Comment: quit 10/19    Drug use: No    Sexual activity: Never       FAMILY HISTORY:  Family History   Problem Relation Age of Onset    Cancer Mother     No Known Problems Father        ALLERGIES AND MEDICATIONS: updated and reviewed.  Review of patient's allergies indicates:   Allergen Reactions    Benadryl [diphenhydramine hcl]      cuase her bp to elevate    Prednisone Other (See Comments)     Reactions:  Increased blood pressure    Asa-calcium carb-mag-aluminum Other (See Comments)     Acid reflux  Acid reflux      Ibuprofen Other (See Comments)     Hx of stroke per patient. Patient  states that she cannot take.    Penicillins     Powder base no.200 Rash     Powder in gloves causes rash on hands, Denies any reaction to latex products     Current Outpatient Medications   Medication Sig Dispense Refill    amitriptyline (ELAVIL) 25 MG tablet Take 1 tablet (25 mg total) by mouth every evening. Begin with one half tablet nightly for one week. 30 tablet 11    amLODIPine (NORVASC) 5 MG tablet Take 1 tablet (5 mg total) by mouth once daily. (Patient taking differently: Take 10 mg by mouth once daily.) 30 tablet 11    atorvastatin (LIPITOR) 40 MG tablet Take 1 tablet (40 mg total) by mouth once daily. 90 tablet 3    cetirizine (ZYRTEC) 10 MG tablet Take 1 tablet (10 mg total) by mouth once daily. 30 tablet 1    famotidine (PEPCID) 20 MG tablet Take 1 tablet (20 mg total) by mouth 2 (two) times daily. 60 tablet 11    LIDOcaine (LIDODERM) 5 % Place 1 patch onto the skin daily as needed (pain). Remove & Discard patch within 12 hours or as directed by  patch 0    mupirocin (BACTROBAN)  2 % ointment Apply topically 3 (three) times daily. 15 g 0    omeprazole (PRILOSEC) 40 MG capsule Take 1 capsule (40 mg total) by mouth 2 (two) times daily before meals. 60 capsule 11    pregabalin (LYRICA) 300 MG Cap Take 1 capsule (300 mg total) by mouth 2 (two) times daily. 60 capsule 2    sucralfate (CARAFATE) 1 gram tablet Take 1 tablet (1 g total) by mouth every 6 (six) hours as needed (heartburn). 30 tablet 11    traMADoL (ULTRAM) 50 mg tablet Take 1 tablet (50 mg total) by mouth every 6 (six) hours as needed for Pain. 5 tablet 0    traZODone (DESYREL) 50 MG tablet Take 2 tablets (100 mg total) by mouth every evening. 60 tablet 11    venlafaxine (EFFEXOR-XR) 37.5 MG 24 hr capsule Take 1 capsule (37.5 mg total) by mouth once daily. 30 capsule 11     Current Facility-Administered Medications   Medication Dose Route Frequency Provider Last Rate Last Admin    acetaminophen tablet 650 mg  650 mg Oral Once PRN Emerson Kaplan PA-C        albuterol inhaler 2 puff  2 puff Inhalation Q20 Min PRN Emerson Kaplan PA-C        diphenhydrAMINE injection 25 mg  25 mg Intravenous Once PRN Emerson Kaplan PA-C        EPINEPHrine (EPIPEN) 0.3 mg/0.3 mL pen injection 0.3 mg  0.3 mg Intramuscular PRN Emerson Kaplan PA-C        methylPREDNISolone sodium succinate injection 40 mg  40 mg Intravenous Once PRN Emerson Kaplan PA-C        ondansetron disintegrating tablet 4 mg  4 mg Oral Once PRN Emerson Kaplan PA-C        sodium chloride 0.9% 500 mL flush bag   Intravenous PRN Emerson Kaplan PA-C        sodium chloride 0.9% flush 10 mL  10 mL Intravenous PRN Emerson Kaplan PA-C           Review of Systems   Constitutional: Negative for activity change, appetite change, fever and unexpected weight change.   HENT: Negative for trouble swallowing and voice change.    Eyes: Negative for photophobia and visual disturbance.   Respiratory: Negative for apnea and shortness of breath.    Cardiovascular:  Negative for chest pain and leg swelling.   Gastrointestinal: Negative for constipation and nausea.   Genitourinary: Negative for difficulty urinating.   Musculoskeletal: Positive for arthralgias and myalgias. Negative for back pain, gait problem and neck pain.   Skin: Negative for color change and pallor.   Neurological: Positive for weakness. Negative for dizziness, seizures, syncope and numbness.   Hematological: Negative for adenopathy.   Psychiatric/Behavioral: Negative for agitation, confusion and decreased concentration.       Neurologic Exam     Mental Status   Oriented to person, place, and time.   Registration: recalls 3 of 3 objects.   Attention: normal. Concentration: normal.   Speech: speech is normal   Level of consciousness: alert  Knowledge: good.     Cranial Nerves     CN II   Visual fields full to confrontation.   Right visual field deficit: none  Left visual field deficit: none     CN III, IV, VI   Extraocular motions are normal.   Right pupil: Size: 3 mm. Shape: regular. Accommodation: intact.   Left pupil: Size: 3 mm. Shape: regular. Accommodation: intact.   CN III: no CN III palsy  CN VI: no CN VI palsy  Nystagmus: none   Diplopia: none  Ophthalmoparesis: none  Upgaze: normal  Downgaze: normal  Conjugate gaze: present    CN V   Facial sensation intact.   Right facial sensation deficit: none  Left facial sensation deficit: none    CN VII   Facial expression full, symmetric.   Right facial weakness: none  Left facial weakness: none    CN VIII   CN VIII normal.     CN IX, X   CN IX normal.   CN X normal.   Palate: symmetric    CN XI   CN XI normal.   Right sternocleidomastoid strength: normal  Left sternocleidomastoid strength: normal  Right trapezius strength: normal  Left trapezius strength: normal    CN XII   CN XII normal.   Tongue deviation: none    Motor Exam   Muscle bulk: normal  Overall muscle tone: normal  Right arm tone: normal  Left arm tone: normal  Right leg tone: normal  Left leg  "tone: normal    Strength   Strength 5/5 throughout.     Gait, Coordination, and Reflexes     Gait  Gait: normal    Coordination   Finger to nose coordination: normal  Heel to shin coordination: normal    Tremor   Resting tremor: absent    Reflexes   Right brachioradialis: 2+  Left brachioradialis: 2+  Right biceps: 2+  Left biceps: 2+  Right triceps: 2+  Left triceps: 2+  Right patellar: 2+  Left patellar: 2+  Right achilles: 2+  Left achilles: 2+      Physical Exam  Vitals reviewed.   Constitutional:       Appearance: She is well-developed.   HENT:      Head: Normocephalic and atraumatic.   Eyes:      Extraocular Movements: EOM normal.   Pulmonary:      Effort: Pulmonary effort is normal. No respiratory distress.   Musculoskeletal:         General: Normal range of motion.      Cervical back: Normal range of motion.   Neurological:      Mental Status: She is alert and oriented to person, place, and time.      Coordination: Finger-Nose-Finger Test and Heel to Shin Test normal.      Gait: Gait is intact.      Deep Tendon Reflexes: Strength normal.      Reflex Scores:       Tricep reflexes are 2+ on the right side and 2+ on the left side.       Bicep reflexes are 2+ on the right side and 2+ on the left side.       Brachioradialis reflexes are 2+ on the right side and 2+ on the left side.       Patellar reflexes are 2+ on the right side and 2+ on the left side.       Achilles reflexes are 2+ on the right side and 2+ on the left side.  Psychiatric:         Speech: Speech normal.         Behavior: Behavior normal.         Thought Content: Thought content normal.         Vitals:    05/11/22 1048   BP: (!) 141/84   Pulse: 68   Weight: 67.9 kg (149 lb 11.1 oz)   Height: 5' 1" (1.549 m)       Assessment & Plan:    Problem List Items Addressed This Visit    None     Visit Diagnoses     Cervical radiculopathy        Relevant Medications    pregabalin (LYRICA) 300 MG Cap    venlafaxine (EFFEXOR-XR) 37.5 MG 24 hr capsule    Other " Relevant Orders    Ambulatory referral/consult to Pain Clinic    MRI Cervical Spine Without Contrast (Completed)          Follow-up: No follow-ups on file.    This note was done with the assistance of voice recognition software. Some errors may be present after proofreading.

## 2022-05-14 ENCOUNTER — HOSPITAL ENCOUNTER (OUTPATIENT)
Dept: RADIOLOGY | Facility: HOSPITAL | Age: 67
Discharge: HOME | End: 2022-05-14
Attending: NEUROLOGICAL SURGERY
Payer: MEDICARE

## 2022-05-14 DIAGNOSIS — M54.12 CERVICAL RADICULOPATHY: ICD-10-CM

## 2022-05-14 PROCEDURE — 72141 MRI NECK SPINE W/O DYE: CPT | Mod: TC

## 2022-05-14 PROCEDURE — 72141 MRI NECK SPINE W/O DYE: CPT | Mod: 26,,, | Performed by: RADIOLOGY

## 2022-05-14 PROCEDURE — 72141 MRI CERVICAL SPINE WITHOUT CONTRAST: ICD-10-PCS | Mod: 26,,, | Performed by: RADIOLOGY

## 2022-06-08 ENCOUNTER — TELEPHONE (OUTPATIENT)
Dept: NEUROLOGY | Facility: CLINIC | Age: 67
End: 2022-06-08
Payer: MEDICARE

## 2022-06-08 NOTE — TELEPHONE ENCOUNTER
----- Message from Missael Roberts sent at 6/8/2022  3:31 PM CDT -----  Type:  Test Results    Who Called: self    Name of Test (Lab/Mammo/Etc): MRI    Date of Test: 5/14    Ordering Provider: Dr. Curran    Where the test was performed: [facility]    Would the patient rather a call back or a response via My [facility]? call    Best Call Back Number:[phone]    Additional Information:      For Clinical Team:Has the provider reviewed the results?

## 2022-09-09 ENCOUNTER — HOSPITAL ENCOUNTER (EMERGENCY)
Facility: HOSPITAL | Age: 67
Discharge: HOME | End: 2022-09-09
Attending: EMERGENCY MEDICINE
Payer: MEDICARE

## 2022-09-09 VITALS
OXYGEN SATURATION: 96 % | BODY MASS INDEX: 29.83 KG/M2 | RESPIRATION RATE: 26 BRPM | TEMPERATURE: 98 F | SYSTOLIC BLOOD PRESSURE: 136 MMHG | DIASTOLIC BLOOD PRESSURE: 80 MMHG | HEIGHT: 61 IN | HEART RATE: 71 BPM | WEIGHT: 158 LBS

## 2022-09-09 DIAGNOSIS — R07.9 CHEST PAIN: ICD-10-CM

## 2022-09-09 LAB
ALBUMIN SERPL BCP-MCNC: 4.3 G/DL (ref 3.5–5.2)
ALP SERPL-CCNC: 63 U/L (ref 55–135)
ALT SERPL W/O P-5'-P-CCNC: 7 U/L (ref 10–44)
ANION GAP SERPL CALC-SCNC: 9 MMOL/L (ref 8–16)
ASCENDING AORTA: 2.53 CM
AST SERPL-CCNC: 14 U/L (ref 10–40)
AV INDEX (PROSTH): 0.82
AV MEAN GRADIENT: 5 MMHG
AV PEAK GRADIENT: 9 MMHG
AV VALVE AREA: 2.48 CM2
AV VELOCITY RATIO: 0.78
BASOPHILS # BLD AUTO: 0.02 K/UL (ref 0–0.2)
BASOPHILS NFR BLD: 0.3 % (ref 0–1.9)
BILIRUB SERPL-MCNC: 1.3 MG/DL (ref 0.1–1)
BNP SERPL-MCNC: <10 PG/ML (ref 0–99)
BSA FOR ECHO PROCEDURE: 1.76 M2
BUN SERPL-MCNC: 7 MG/DL (ref 8–23)
CALCIUM SERPL-MCNC: 10.3 MG/DL (ref 8.7–10.5)
CHLORIDE SERPL-SCNC: 104 MMOL/L (ref 95–110)
CO2 SERPL-SCNC: 27 MMOL/L (ref 23–29)
CREAT SERPL-MCNC: 0.9 MG/DL (ref 0.5–1.4)
CV ECHO LV RWT: 0.43 CM
CV STRESS BASE HR: 77 BPM
DIASTOLIC BLOOD PRESSURE: 83 MMHG
DIFFERENTIAL METHOD: ABNORMAL
DOP CALC AO PEAK VEL: 1.49 M/S
DOP CALC AO VTI: 26.19 CM
DOP CALC LVOT AREA: 3 CM2
DOP CALC LVOT DIAMETER: 1.96 CM
DOP CALC LVOT PEAK VEL: 1.16 M/S
DOP CALC LVOT STROKE VOLUME: 65.02 CM3
DOP CALCLVOT PEAK VEL VTI: 21.56 CM
E WAVE DECELERATION TIME: 152.86 MSEC
E/A RATIO: 0.85
E/E' RATIO: 13.38 M/S
ECHO LV POSTERIOR WALL: 0.98 CM (ref 0.6–1.1)
EJECTION FRACTION: 65 %
EOSINOPHIL # BLD AUTO: 0 K/UL (ref 0–0.5)
EOSINOPHIL NFR BLD: 0.5 % (ref 0–8)
ERYTHROCYTE [DISTWIDTH] IN BLOOD BY AUTOMATED COUNT: 13.7 % (ref 11.5–14.5)
EST. GFR  (NO RACE VARIABLE): >60 ML/MIN/1.73 M^2
FRACTIONAL SHORTENING: 36 % (ref 28–44)
GLUCOSE SERPL-MCNC: 100 MG/DL (ref 70–110)
HCT VFR BLD AUTO: 42 % (ref 37–48.5)
HGB BLD-MCNC: 13.4 G/DL (ref 12–16)
IMM GRANULOCYTES # BLD AUTO: 0.02 K/UL (ref 0–0.04)
IMM GRANULOCYTES NFR BLD AUTO: 0.3 % (ref 0–0.5)
INTERVENTRICULAR SEPTUM: 0.92 CM (ref 0.6–1.1)
IVRT: 83.04 MSEC
LA MAJOR: 4.49 CM
LA MINOR: 4.69 CM
LA WIDTH: 3.9 CM
LEFT ATRIUM SIZE: 3.25 CM
LEFT ATRIUM VOLUME INDEX: 28.9 ML/M2
LEFT ATRIUM VOLUME: 49.43 CM3
LEFT INTERNAL DIMENSION IN SYSTOLE: 2.9 CM (ref 2.1–4)
LEFT VENTRICLE DIASTOLIC VOLUME INDEX: 54.25 ML/M2
LEFT VENTRICLE DIASTOLIC VOLUME: 92.76 ML
LEFT VENTRICLE MASS INDEX: 84 G/M2
LEFT VENTRICLE SYSTOLIC VOLUME INDEX: 18.8 ML/M2
LEFT VENTRICLE SYSTOLIC VOLUME: 32.09 ML
LEFT VENTRICULAR INTERNAL DIMENSION IN DIASTOLE: 4.51 CM (ref 3.5–6)
LEFT VENTRICULAR MASS: 143.41 G
LV LATERAL E/E' RATIO: 12.43 M/S
LV SEPTAL E/E' RATIO: 14.5 M/S
LYMPHOCYTES # BLD AUTO: 1.7 K/UL (ref 1–4.8)
LYMPHOCYTES NFR BLD: 27.2 % (ref 18–48)
MCH RBC QN AUTO: 27.1 PG (ref 27–31)
MCHC RBC AUTO-ENTMCNC: 31.9 G/DL (ref 32–36)
MCV RBC AUTO: 85 FL (ref 82–98)
MONOCYTES # BLD AUTO: 0.5 K/UL (ref 0.3–1)
MONOCYTES NFR BLD: 8.1 % (ref 4–15)
MV PEAK A VEL: 1.02 M/S
MV PEAK E VEL: 0.87 M/S
NEUTROPHILS # BLD AUTO: 3.9 K/UL (ref 1.8–7.7)
NEUTROPHILS NFR BLD: 63.6 % (ref 38–73)
NRBC BLD-RTO: 0 /100 WBC
NUC STRESS EJECTION FRACTION: 74 %
OHS CV CPX 85 PERCENT MAX PREDICTED HEART RATE MALE: 126
OHS CV CPX MAX PREDICTED HEART RATE: 148
OHS CV CPX PATIENT IS FEMALE: 1
OHS CV CPX PATIENT IS MALE: 0
OHS CV CPX PEAK DIASTOLIC BLOOD PRESSURE: 71 MMHG
OHS CV CPX PEAK HEAR RATE: 111 BPM
OHS CV CPX PEAK RATE PRESSURE PRODUCT: NORMAL
OHS CV CPX PEAK SYSTOLIC BLOOD PRESSURE: 137 MMHG
OHS CV CPX PERCENT MAX PREDICTED HEART RATE ACHIEVED: 75
OHS CV CPX RATE PRESSURE PRODUCT PRESENTING: 8393
PISA TR MAX VEL: 2.15 M/S
PLATELET # BLD AUTO: 234 K/UL (ref 150–450)
PMV BLD AUTO: 10.1 FL (ref 9.2–12.9)
POTASSIUM SERPL-SCNC: 3.8 MMOL/L (ref 3.5–5.1)
PROT SERPL-MCNC: 7.7 G/DL (ref 6–8.4)
PV PEAK VELOCITY: 1.11 CM/S
RA MAJOR: 4.22 CM
RA PRESSURE: 3 MMHG
RA WIDTH: 2.4 CM
RBC # BLD AUTO: 4.95 M/UL (ref 4–5.4)
RIGHT VENTRICULAR END-DIASTOLIC DIMENSION: 2.82 CM
RV TISSUE DOPPLER FREE WALL SYSTOLIC VELOCITY 1 (APICAL 4 CHAMBER VIEW): 18.46 CM/S
SINUS: 3.23 CM
SODIUM SERPL-SCNC: 140 MMOL/L (ref 136–145)
STJ: 1.93 CM
SYSTOLIC BLOOD PRESSURE: 109 MMHG
TDI LATERAL: 0.07 M/S
TDI SEPTAL: 0.06 M/S
TDI: 0.07 M/S
TR MAX PG: 18 MMHG
TRICUSPID ANNULAR PLANE SYSTOLIC EXCURSION: 2.43 CM
TROPONIN I SERPL DL<=0.01 NG/ML-MCNC: <0.006 NG/ML (ref 0–0.03)
TROPONIN I SERPL DL<=0.01 NG/ML-MCNC: <0.006 NG/ML (ref 0–0.03)
TV REST PULMONARY ARTERY PRESSURE: 21 MMHG
WBC # BLD AUTO: 6.17 K/UL (ref 3.9–12.7)

## 2022-09-09 PROCEDURE — 93005 ELECTROCARDIOGRAM TRACING: CPT | Mod: 59

## 2022-09-09 PROCEDURE — 84484 ASSAY OF TROPONIN QUANT: CPT | Mod: 91 | Performed by: EMERGENCY MEDICINE

## 2022-09-09 PROCEDURE — 93010 EKG 12-LEAD: ICD-10-PCS | Mod: ,,, | Performed by: INTERNAL MEDICINE

## 2022-09-09 PROCEDURE — 99284 EMERGENCY DEPT VISIT MOD MDM: CPT | Mod: 25,,, | Performed by: INTERNAL MEDICINE

## 2022-09-09 PROCEDURE — 85025 COMPLETE CBC W/AUTO DIFF WBC: CPT | Performed by: EMERGENCY MEDICINE

## 2022-09-09 PROCEDURE — 25000003 PHARM REV CODE 250: Performed by: EMERGENCY MEDICINE

## 2022-09-09 PROCEDURE — 83880 ASSAY OF NATRIURETIC PEPTIDE: CPT | Performed by: EMERGENCY MEDICINE

## 2022-09-09 PROCEDURE — 99284 PR EMERGENCY DEPT VISIT,LEVEL IV: ICD-10-PCS | Mod: 25,,, | Performed by: INTERNAL MEDICINE

## 2022-09-09 PROCEDURE — 99285 EMERGENCY DEPT VISIT HI MDM: CPT | Mod: 25

## 2022-09-09 PROCEDURE — 63600175 PHARM REV CODE 636 W HCPCS: Performed by: INTERNAL MEDICINE

## 2022-09-09 PROCEDURE — 93010 ELECTROCARDIOGRAM REPORT: CPT | Mod: ,,, | Performed by: INTERNAL MEDICINE

## 2022-09-09 PROCEDURE — 80053 COMPREHEN METABOLIC PANEL: CPT | Performed by: EMERGENCY MEDICINE

## 2022-09-09 RX ORDER — MAG HYDROX/ALUMINUM HYD/SIMETH 200-200-20
30 SUSPENSION, ORAL (FINAL DOSE FORM) ORAL ONCE
Status: COMPLETED | OUTPATIENT
Start: 2022-09-09 | End: 2022-09-09

## 2022-09-09 RX ORDER — DICYCLOMINE HYDROCHLORIDE 20 MG/1
20 TABLET ORAL 3 TIMES DAILY PRN
Qty: 20 TABLET | Refills: 0 | Status: SHIPPED | OUTPATIENT
Start: 2022-09-09 | End: 2022-09-16

## 2022-09-09 RX ORDER — LIDOCAINE HYDROCHLORIDE 20 MG/ML
15 SOLUTION OROPHARYNGEAL ONCE
Status: COMPLETED | OUTPATIENT
Start: 2022-09-09 | End: 2022-09-09

## 2022-09-09 RX ORDER — REGADENOSON 0.08 MG/ML
0.4 INJECTION, SOLUTION INTRAVENOUS ONCE
Status: COMPLETED | OUTPATIENT
Start: 2022-09-09 | End: 2022-09-09

## 2022-09-09 RX ORDER — DICYCLOMINE HYDROCHLORIDE 10 MG/1
20 CAPSULE ORAL
Status: COMPLETED | OUTPATIENT
Start: 2022-09-09 | End: 2022-09-09

## 2022-09-09 RX ADMIN — LIDOCAINE HYDROCHLORIDE 15 ML: 20 SOLUTION ORAL at 10:09

## 2022-09-09 RX ADMIN — DICYCLOMINE HYDROCHLORIDE 20 MG: 10 CAPSULE ORAL at 10:09

## 2022-09-09 RX ADMIN — REGADENOSON 0.4 MG: 0.08 INJECTION, SOLUTION INTRAVENOUS at 01:09

## 2022-09-09 RX ADMIN — ALUMINUM HYDROXIDE, MAGNESIUM HYDROXIDE, AND SIMETHICONE 30 ML: 200; 200; 20 SUSPENSION ORAL at 10:09

## 2022-09-09 NOTE — ED PROVIDER NOTES
"Encounter Date: 9/9/2022       History     Chief Complaint   Patient presents with    Chest Pain     Pt reports epigastric pain that radiates to the middle of her back that started 3 days ago along with diarrhea and nausea that started this morning.     67-year-old female with a history of prior stroke presenting with epigastric chest pressure, "heaviness", starting approximately 3 days ago and constant.  Denies exacerbating or relieving factors.  Patient states she feels like she needs to continually belch is unable to.  Notes minimal nausea.  Denies shortness of breath, lower extremity edema, cough, hemoptysis, fevers, chills.  She reports her primary care provider tested her for COVID and prescribed her acid reflux medication without improvement.  Denies history of similar.  Reports stress test approximately 2 years ago.  Previously in usual state of health.    Review of patient's allergies indicates:   Allergen Reactions    Benadryl [diphenhydramine hcl]      cuase her bp to elevate    Prednisone Other (See Comments)     Reactions:  Increased blood pressure    Asa-calcium carb-mag-aluminum Other (See Comments)     Acid reflux  Acid reflux      Ibuprofen Other (See Comments)     Hx of stroke per patient. Patient  states that she cannot take.    Penicillins     Powder base no.200 Rash     Powder in gloves causes rash on hands, Denies any reaction to latex products     Past Medical History:   Diagnosis Date    Chronic pain     left arm    Stroke     2019     Past Surgical History:   Procedure Laterality Date    BUNIONECTOMY      LEG TENDON SURGERY Right      Family History   Problem Relation Age of Onset    Cancer Mother     No Known Problems Father      Social History     Tobacco Use    Smoking status: Never    Smokeless tobacco: Never   Substance Use Topics    Alcohol use: Not Currently     Comment: quit 10/19    Drug use: No     Review of Systems   Constitutional:  Negative for chills and fever.   HENT:  " Negative for sore throat.    Respiratory:  Positive for chest tightness. Negative for shortness of breath.    Cardiovascular:  Positive for chest pain.   Gastrointestinal:  Negative for nausea and vomiting.   Genitourinary:  Negative for dysuria.   Musculoskeletal:  Negative for back pain.   Skin:  Negative for rash.   Neurological:  Negative for weakness.   Psychiatric/Behavioral:  Negative for confusion.      Physical Exam     Initial Vitals [09/09/22 0840]   BP Pulse Resp Temp SpO2   139/81 88 18 98.4 °F (36.9 °C) 99 %      MAP       --         Physical Exam    Nursing note and vitals reviewed.  Constitutional: She appears well-developed and well-nourished. She is not diaphoretic. No distress.   HENT:   Head: Normocephalic and atraumatic.   Nose: Nose normal.   Eyes: EOM are normal. Pupils are equal, round, and reactive to light. No scleral icterus.   Neck: Neck supple.   Normal range of motion.  Cardiovascular:  Normal rate, regular rhythm, normal heart sounds and intact distal pulses.     Exam reveals no gallop and no friction rub.       No murmur heard.  Pulmonary/Chest: Breath sounds normal. No stridor. No respiratory distress. She has no wheezes. She has no rhonchi. She has no rales.   Abdominal: Abdomen is soft. Bowel sounds are normal. She exhibits no distension. There is no abdominal tenderness. There is no rebound and no guarding.   Musculoskeletal:         General: No tenderness or edema. Normal range of motion.      Cervical back: Normal range of motion and neck supple.     Neurological: She is alert and oriented to person, place, and time. No cranial nerve deficit. GCS score is 15. GCS eye subscore is 4. GCS verbal subscore is 5. GCS motor subscore is 6.   Skin: Skin is warm and dry. No rash noted.   Psychiatric: She has a normal mood and affect. Her behavior is normal.       ED Course   Procedures  Labs Reviewed   CBC W/ AUTO DIFFERENTIAL - Abnormal; Notable for the following components:       Result  Value    MCHC 31.9 (*)     All other components within normal limits   COMPREHENSIVE METABOLIC PANEL - Abnormal; Notable for the following components:    BUN 7 (*)     Total Bilirubin 1.3 (*)     ALT 7 (*)     All other components within normal limits   TROPONIN I   B-TYPE NATRIURETIC PEPTIDE   TROPONIN I     EKG Readings: (Independently Interpreted)   Initial Reading: No STEMI. Previous EKG: Compared with most recent EKG Rhythm: Normal Sinus Rhythm. Heart Rate: 95.   No ST segment elevation or depression concerning for acute ischemia.      ECG Results              EKG 12-lead (Final result)  Result time 09/09/22 15:45:59      Final result by Interface, Lab In [facility] (09/09/22 15:45:59)                   Narrative:    Test Reason : R07.9,    Vent. Rate : 095 BPM     Atrial Rate : 095 BPM     P-R Int : 170 ms          QRS Dur : 086 ms      QT Int : 340 ms       P-R-T Axes : 076 082 -44 degrees     QTc Int : 427 ms    Normal sinus rhythm  Anterior infarct (cited on or before 10-OCT-2021)  T wave abnormality, consider inferolateral ischemia  Abnormal ECG  When compared with ECG of 01-APR-2022 09:08,  Significant changes have occurred  Confirmed by Jesse Pardo MD ([id number]) on 9/9/2022 3:45:48 PM    Referred By: [email or web address]   SELF           Confirmed By:Jesse Pardo MD                                  Imaging Results              X-Ray Chest AP Portable (Final result)  Result time 09/09/22 09:37:53      Final result by Derrell Reed MD (09/09/22 09:37:53)                   Impression:      No convincing evidence of acute detrimental change relative to prior radiograph performed 04/01/2022.      Electronically signed by: Derrell Reed  Date:    09/09/2022  Time:    09:37               Narrative:    EXAMINATION:  XR CHEST AP PORTABLE    CLINICAL HISTORY:  Chest Pain;    TECHNIQUE:  Single frontal view of the chest was performed.    COMPARISON:  Chest radiograph performed 04/01/2022    FINDINGS:  Monitoring leads  overlie the chest.  Cardiomediastinal contours appear grossly unchanged.  Chronic interstitial coarsening, similar to prior study performed 04/01/2022.    No definite pneumothorax or large volume pleural effusion.  No acute findings identified in the visualized abdomen.  Osseous and soft tissue structures appear without definite acute abnormality.                                       Medications   aluminum-magnesium hydroxide-simethicone 200-200-20 mg/5 mL suspension 30 mL (30 mLs Oral Given 9/9/22 1025)     And   LIDOcaine HCl 2% oral solution 15 mL (15 mLs Oral Given 9/9/22 1025)   dicyclomine capsule 20 mg (20 mg Oral Given 9/9/22 1024)   regadenoson injection 0.4 mg (0.4 mg Intravenous Given 9/9/22 1315)     Medical Decision Making:   Initial Assessment:   67 year old patient with hx of stroke presenting secondary to chest pain. Patient is at medium risk of ACS due to risk factors and HPI with a heart score of 3-4. Patient has received an aspirin. Troponins, Cxr, ekg, and labs ordered which showed no acute findings.    [email or web address]    Also considered but less likely:     PE: normal rate, no sob/recent immovilization/surgery/travel/family history  Pneumonia: chest xray negative. No fever or leukoscytosis. No cough and lungs non consistent with pna  Tamponade: unlikely due to chest xray and ekg  STEMI: No STEMI on ekg  Dissection: equal pulses bilaterally and no ripping chest pain to the back  Esophageal rupture: no dysphagia or vomiting and chest xray negative for mediastinal air  Arrhythmia: no arrhythmia on ekg  CHF: no fluid overload on Cxr and physical exam  Pneumothorax: bilateral breath sounds and no signs of pneumothorax on chest xray     Discussed patient with Dr Pardo who recommends stress test and echo. If negative, okay for DC home.    ED Management:  Patient seen by Dr. Pardo after consult placed.  He has recommended a echo and stress test.  If negative he  has cleared her for discharge home.     Results of echo and stress test reassuring.  Patient okay for discharge home.  Discussed return precautions.                    Clinical Impression:   Final diagnoses:  [R07.9] Chest pain        ED Disposition Condition    Discharge Stable          ED Prescriptions       Medication Sig Dispense Start Date End Date Auth. Provider    dicyclomine (BENTYL) 20 mg tablet Take 1 tablet (20 mg total) by mouth 3 (three) times daily as needed (abdominal cramping). 20 tablet 9/9/2022 9/16/2022 Slava Gordon MD          Follow-up Information    None          Slava Gordon MD  09/09/22 4005

## 2022-09-09 NOTE — ED NOTES
Pt. Pressed the call light to inform me that she is feeling numbness to her left hand and left foot. Pt. Also states that she got a shot in her left foot from a bone and joint doctor and she was wondering if that had something to do with it. Dr. Gordon was informed.

## 2022-09-09 NOTE — SUBJECTIVE & OBJECTIVE
Past Medical History:   Diagnosis Date    Chronic pain     left arm    Stroke     2019       Past Surgical History:   Procedure Laterality Date    BUNIONECTOMY      LEG TENDON SURGERY Right        Review of patient's allergies indicates:   Allergen Reactions    Benadryl [diphenhydramine hcl]      cuase her bp to elevate    Prednisone Other (See Comments)     Reactions:  Increased blood pressure    Asa-calcium carb-mag-aluminum Other (See Comments)     Acid reflux  Acid reflux      Ibuprofen Other (See Comments)     Hx of stroke per patient. Patient  states that she cannot take.    Penicillins     Powder base no.200 Rash     Powder in gloves causes rash on hands, Denies any reaction to latex products       Current Facility-Administered Medications on File Prior to Encounter   Medication    acetaminophen tablet 650 mg    albuterol inhaler 2 puff    diphenhydrAMINE injection 25 mg    EPINEPHrine (EPIPEN) 0.3 mg/0.3 mL pen injection 0.3 mg    methylPREDNISolone sodium succinate injection 40 mg    ondansetron disintegrating tablet 4 mg    sodium chloride 0.9% 500 mL flush bag    sodium chloride 0.9% flush 10 mL     Current Outpatient Medications on File Prior to Encounter   Medication Sig    amitriptyline (ELAVIL) 25 MG tablet Take 1 tablet (25 mg total) by mouth every evening. Begin with one half tablet nightly for one week.    amLODIPine (NORVASC) 5 MG tablet Take 1 tablet (5 mg total) by mouth once daily. (Patient taking differently: Take 10 mg by mouth once daily.)    atorvastatin (LIPITOR) 40 MG tablet Take 1 tablet (40 mg total) by mouth once daily.    cetirizine (ZYRTEC) 10 MG tablet Take 1 tablet (10 mg total) by mouth once daily.    famotidine (PEPCID) 20 MG tablet Take 1 tablet (20 mg total) by mouth 2 (two) times daily.    LIDOcaine (LIDODERM) 5 % Place 1 patch onto the skin daily as needed (pain). Remove & Discard patch within 12 hours or as directed by MD    mupirocin (BACTROBAN) 2 % ointment Apply topically  3 (three) times daily.    omeprazole (PRILOSEC) 40 MG capsule Take 1 capsule (40 mg total) by mouth 2 (two) times daily before meals.    pregabalin (LYRICA) 50 MG capsule TAKE ONE CAPSULE BY MOUTH THREE TIMES DAILY    sucralfate (CARAFATE) 1 gram tablet Take 1 tablet (1 g total) by mouth every 6 (six) hours as needed (heartburn).    traMADoL (ULTRAM) 50 mg tablet Take 1 tablet (50 mg total) by mouth every 6 (six) hours as needed for Pain.    traZODone (DESYREL) 50 MG tablet Take 2 tablets (100 mg total) by mouth every evening.    venlafaxine (EFFEXOR-XR) 37.5 MG 24 hr capsule Take 1 capsule (37.5 mg total) by mouth once daily.    [DISCONTINUED] aspirin (ECOTRIN) 81 MG EC tablet Take 1 tablet (81 mg total) by mouth once daily.    [DISCONTINUED] folic acid (FOLVITE) 1 MG tablet Take 1 mg by mouth once daily.    [DISCONTINUED] pantoprazole (PROTONIX) 40 MG tablet Take 1 tablet (40 mg total) by mouth once daily.     Family History       Problem Relation (Age of Onset)    Cancer Mother    No Known Problems Father          Tobacco Use    Smoking status: Never    Smokeless tobacco: Never   Substance and Sexual Activity    Alcohol use: Not Currently     Comment: quit 10/19    Drug use: No    Sexual activity: Never     Review of Systems   Constitutional: Negative for decreased appetite.   HENT:  Negative for ear discharge.    Eyes:  Negative for blurred vision.   Respiratory:  Negative for hemoptysis.    Endocrine: Negative for polyphagia.   Hematologic/Lymphatic: Negative for adenopathy.   Skin:  Negative for color change.   Musculoskeletal:  Negative for joint swelling.   Genitourinary:  Negative for bladder incontinence.   Neurological:  Negative for brief paralysis.   Psychiatric/Behavioral:  Negative for hallucinations.    Allergic/Immunologic: Negative for hives.   Objective:     Vital Signs (Most Recent):  Temp: 98.4 °F (36.9 °C) (09/09/22 0840)  Pulse: 88 (09/09/22 0840)  Resp: 18 (09/09/22 0840)  BP: 139/81  (09/09/22 0840)  SpO2: 99 % (09/09/22 0840) Vital Signs (24h Range):  Temp:  [98.4 °F (36.9 °C)] 98.4 °F (36.9 °C)  Pulse:  [88] 88  Resp:  [18] 18  SpO2:  [99 %] 99 %  BP: (139)/(81) 139/81     Weight: 71.7 kg (158 lb)  Body mass index is 29.85 kg/m².    SpO2: 99 %  O2 Device (Oxygen Therapy): room air    No intake or output data in the 24 hours ending 09/09/22 1137    Lines/Drains/Airways       Peripheral Intravenous Line  Duration                  Peripheral IV - Single Lumen 09/09/22 0920 20 G Right Antecubital <1 day                    Physical Exam  Constitutional:       Appearance: She is well-developed.   HENT:      Head: Normocephalic and atraumatic.   Eyes:      Conjunctiva/sclera: Conjunctivae normal.      Pupils: Pupils are equal, round, and reactive to light.   Cardiovascular:      Rate and Rhythm: Normal rate.      Pulses: Intact distal pulses.      Heart sounds: Normal heart sounds.   Pulmonary:      Effort: Pulmonary effort is normal.      Breath sounds: Normal breath sounds.   Abdominal:      General: Bowel sounds are normal.      Palpations: Abdomen is soft.   Musculoskeletal:         General: Normal range of motion.      Cervical back: Normal range of motion and neck supple.   Skin:     General: Skin is warm and dry.   Neurological:      Mental Status: She is alert and oriented to person, place, and time.       Significant Labs: All pertinent lab results from the last 24 hours have been reviewed.    Significant Imaging: Echocardiogram: Transthoracic echo (TTE) complete (Cupid Only):   Results for orders placed or performed during the hospital encounter of 02/10/20   Echo Color Flow Doppler? Yes   Result Value Ref Range    BSA 1.78 m2    LA WIDTH 3.53 cm    AORTIC VALVE CUSP SEPERATION 1.79 cm    PV PEAK VELOCITY 0.70 cm/s    LVIDd 4.38 3.5 - 6.0 cm    IVS 0.82 0.6 - 1.1 cm    Posterior Wall 1.06 0.6 - 1.1 cm    Ao root annulus 2.64 cm    LVIDs 3.12 2.1 - 4.0 cm    FS 29 28 - 44 %    LA volume  34.55 cm3    Sinus 3.04 cm    STJ 2.61 cm    Ascending aorta 2.64 cm    LV mass 134.79 g    LA size 2.93 cm    RVDD 2.58 cm    TAPSE 2.34 cm    RV S' 12.79 cm/s    Left Ventricle Relative Wall Thickness 0.48 cm    AV mean gradient 3 mmHg    AV valve area 2.34 cm2    AV Velocity Ratio 0.76     AV index (prosthetic) 0.66     E/A ratio 0.80     E wave deceleration time 203.97 msec    IVRT 0.11 msec    Pulm vein S/D ratio 1.60     LVOT diameter 2.12 cm    LVOT area 3.5 cm2    LVOT peak parvez 0.94 m/s    LVOT peak VTI 17.16 cm    Ao peak parvez 1.23 m/s    Ao VTI 25.89 cm    LVOT stroke volume 60.54 cm3    AV peak gradient 6 mmHg    MV Peak E Parvez 0.74 m/s    TR Max Parvez 1.92 m/s    MV Peak A Parvez 0.92 m/s    PV Peak S Parvez 0.64 m/s    PV Peak D Parvez 0.40 m/s    LV Systolic Volume 38.53 mL    LV Systolic Volume Index 22.3 mL/m2    LV Diastolic Volume 86.78 mL    LV Diastolic Volume Index 50.24 mL/m2    LA Volume Index 20.0 mL/m2    LV Mass Index 78 g/m2    RA Major Axis 3.57 cm    Left Atrium Minor Axis 3.91 cm    Left Atrium Major Axis 3.95 cm    Triscuspid Valve Regurgitation Peak Gradient 15 mmHg    RA Width 2.88 cm    Right Atrial Pressure (from IVC) 3 mmHg    TV rest pulmonary artery pressure 18 mmHg    Narrative    · Normal left ventricular systolic function. The estimated ejection   fraction is 65%.  · Normal LV diastolic function.  · Normal right ventricular systolic function.  · Moderate left atrial enlargement.  · Mild mitral regurgitation.  · The estimated PA systolic pressure is 18 mmHg.  · Normal central venous pressure (3 mmHg).

## 2022-09-09 NOTE — HPI
"   67-year-old female with a history of prior stroke presenting with epigastric chest pressure, "heaviness", starting approximately 3 days ago and constant.  Denies exacerbating or relieving factors.  Patient states she feels like she needs to continually belch is unable to.  Notes minimal nausea.  Denies shortness of breath, lower extremity edema, cough, hemoptysis, fevers, chills.  She reports her primary care provider tested her for COVID and prescribed her acid reflux medication without improvement.  Denies history of similar.  Reports stress test approximately 2 years ago.  Previously in usual state of health.    CP improved after GI cocktail - described as a central tightness  EKG NSR Mild diffuse TWI - more pronounced than on prior EKG  Troponin negative   BNP < 10    Followed by Dr Cole    Echo 2/11/20  Normal left ventricular systolic function. The estimated ejection fraction is 65%.  Normal LV diastolic function.  Normal right ventricular systolic function.  Moderate left atrial enlargement.  Mild mitral regurgitation.  The estimated PA systolic pressure is 18 mmHg.  Normal central venous pressure (3 mmHg).    Stress test 2016  Impression: NORMAL MYOCARDIAL PERFUSION   1. The perfusion scan is free of evidence for myocardial ischemia or injury.   2. Resting wall motion is physiologic.   3. Visually estimated LV function is normal.   "

## 2022-09-09 NOTE — ASSESSMENT & PLAN NOTE
Mostly atypical. Troponin negative - improved with GI cocktail. EKG with new diffuse mild TWI. Echo and stress test today - ok for d/c today if negative for ischemia

## 2022-09-09 NOTE — CONSULTS
"[facility] - [place] - Emergency Dept  Cardiology  Consult Note    Patient Name: Grazyna Durand  MRN: [id number]  Admission Date: 9/9/2022  Hospital Length of Stay: 0 days  Code Status: Prior   Attending Provider: Slava Gordon MD   Consulting Provider: Jesse Pardo MD  Primary Care Physician: Yvon Good MD  Principal Problem:<principal problem not specified>    Patient information was obtained from patient and ER records.     Consults  Subjective:     Chief Complaint:  CP     HPI:      67-year-old female with a history of prior stroke presenting with epigastric chest pressure, "heaviness", starting approximately 3 days ago and constant.  Denies exacerbating or relieving factors.  Patient states she feels like she needs to continually belch is unable to.  Notes minimal nausea.  Denies shortness of breath, lower extremity edema, cough, hemoptysis, fevers, chills.  She reports her primary care provider tested her for COVID and prescribed her acid reflux medication without improvement.  Denies history of similar.  Reports stress test approximately 2 years ago.  Previously in usual state of health.    CP improved after GI cocktail - described as a central tightness  EKG NSR Mild diffuse TWI - more pronounced than on prior EKG  Troponin negative   BNP < 10    Followed by Dr Cole    Echo 2/11/20   Normal left ventricular systolic function. The estimated ejection fraction is 65%.   Normal LV diastolic function.   Normal right ventricular systolic function.   Moderate left atrial enlargement.   Mild mitral regurgitation.   The estimated PA systolic pressure is 18 mmHg.   Normal central venous pressure (3 mmHg).    Stress test 2016  Impression: NORMAL MYOCARDIAL PERFUSION   1. The perfusion scan is free of evidence for myocardial ischemia or injury.   2. Resting wall motion is physiologic.   3. Visually estimated LV function is normal.       Past Medical History:   Diagnosis Date    Chronic pain     left arm    " Stroke     2019       Past Surgical History:   Procedure Laterality Date    BUNIONECTOMY      LEG TENDON SURGERY Right        Review of patient's allergies indicates:   Allergen Reactions    Benadryl [diphenhydramine hcl]      cuase her bp to elevate    Prednisone Other (See Comments)     Reactions:  Increased blood pressure    Asa-calcium carb-mag-aluminum Other (See Comments)     Acid reflux  Acid reflux      Ibuprofen Other (See Comments)     Hx of stroke per patient. Patient  states that she cannot take.    Penicillins     Powder base no.200 Rash     Powder in gloves causes rash on hands, Denies any reaction to latex products       Current Facility-Administered Medications on File Prior to Encounter   Medication    acetaminophen tablet 650 mg    albuterol inhaler 2 puff    diphenhydrAMINE injection 25 mg    EPINEPHrine (EPIPEN) 0.3 mg/0.3 mL pen injection 0.3 mg    methylPREDNISolone sodium succinate injection 40 mg    ondansetron disintegrating tablet 4 mg    sodium chloride 0.9% 500 mL flush bag    sodium chloride 0.9% flush 10 mL     Current Outpatient Medications on File Prior to Encounter   Medication Sig    amitriptyline (ELAVIL) 25 MG tablet Take 1 tablet (25 mg total) by mouth every evening. Begin with one half tablet nightly for one week.    amLODIPine (NORVASC) 5 MG tablet Take 1 tablet (5 mg total) by mouth once daily. (Patient taking differently: Take 10 mg by mouth once daily.)    atorvastatin (LIPITOR) 40 MG tablet Take 1 tablet (40 mg total) by mouth once daily.    cetirizine (ZYRTEC) 10 MG tablet Take 1 tablet (10 mg total) by mouth once daily.    famotidine (PEPCID) 20 MG tablet Take 1 tablet (20 mg total) by mouth 2 (two) times daily.    LIDOcaine (LIDODERM) 5 % Place 1 patch onto the skin daily as needed (pain). Remove & Discard patch within 12 hours or as directed by MD    mupirocin (BACTROBAN) 2 % ointment Apply topically 3 (three) times daily.    omeprazole  (PRILOSEC) 40 MG capsule Take 1 capsule (40 mg total) by mouth 2 (two) times daily before meals.    pregabalin (LYRICA) 50 MG capsule TAKE ONE CAPSULE BY MOUTH THREE TIMES DAILY    sucralfate (CARAFATE) 1 gram tablet Take 1 tablet (1 g total) by mouth every 6 (six) hours as needed (heartburn).    traMADoL (ULTRAM) 50 mg tablet Take 1 tablet (50 mg total) by mouth every 6 (six) hours as needed for Pain.    traZODone (DESYREL) 50 MG tablet Take 2 tablets (100 mg total) by mouth every evening.    venlafaxine (EFFEXOR-XR) 37.5 MG 24 hr capsule Take 1 capsule (37.5 mg total) by mouth once daily.    [DISCONTINUED] aspirin (ECOTRIN) 81 MG EC tablet Take 1 tablet (81 mg total) by mouth once daily.    [DISCONTINUED] folic acid (FOLVITE) 1 MG tablet Take 1 mg by mouth once daily.    [DISCONTINUED] pantoprazole (PROTONIX) 40 MG tablet Take 1 tablet (40 mg total) by mouth once daily.     Family History       Problem Relation (Age of Onset)    Cancer Mother    No Known Problems Father          Tobacco Use    Smoking status: Never    Smokeless tobacco: Never   Substance and Sexual Activity    Alcohol use: Not Currently     Comment: quit 10/19    Drug use: No    Sexual activity: Never     Review of Systems   Constitutional: Negative for decreased appetite.   HENT:  Negative for ear discharge.    Eyes:  Negative for blurred vision.   Respiratory:  Negative for hemoptysis.    Endocrine: Negative for polyphagia.   Hematologic/Lymphatic: Negative for adenopathy.   Skin:  Negative for color change.   Musculoskeletal:  Negative for joint swelling.   Genitourinary:  Negative for bladder incontinence.   Neurological:  Negative for brief paralysis.   Psychiatric/Behavioral:  Negative for hallucinations.    Allergic/Immunologic: Negative for hives.   Objective:     Vital Signs (Most Recent):  Temp: 98.4 °F (36.9 °C) (09/09/22 0840)  Pulse: 88 (09/09/22 0840)  Resp: 18 (09/09/22 0840)  BP: 139/81 (09/09/22 0840)  SpO2: 99 %  (09/09/22 0840) Vital Signs (24h Range):  Temp:  [98.4 °F (36.9 °C)] 98.4 °F (36.9 °C)  Pulse:  [88] 88  Resp:  [18] 18  SpO2:  [99 %] 99 %  BP: (139)/(81) 139/81     Weight: 71.7 kg (158 lb)  Body mass index is 29.85 kg/m².    SpO2: 99 %  O2 Device (Oxygen Therapy): room air    No intake or output data in the 24 hours ending 09/09/22 1137    Lines/Drains/Airways       Peripheral Intravenous Line  Duration                  Peripheral IV - Single Lumen 09/09/22 0920 20 G Right Antecubital <1 day                    Physical Exam  Constitutional:       Appearance: She is well-developed.   HENT:      Head: Normocephalic and atraumatic.   Eyes:      Conjunctiva/sclera: Conjunctivae normal.      Pupils: Pupils are equal, round, and reactive to light.   Cardiovascular:      Rate and Rhythm: Normal rate.      Pulses: Intact distal pulses.      Heart sounds: Normal heart sounds.   Pulmonary:      Effort: Pulmonary effort is normal.      Breath sounds: Normal breath sounds.   Abdominal:      General: Bowel sounds are normal.      Palpations: Abdomen is soft.   Musculoskeletal:         General: Normal range of motion.      Cervical back: Normal range of motion and neck supple.   Skin:     General: Skin is warm and dry.   Neurological:      Mental Status: She is alert and oriented to person, place, and time.       Significant Labs: All pertinent lab results from the last 24 hours have been reviewed.    Significant Imaging: Echocardiogram: Transthoracic echo (TTE) complete (Cupid Only):   Results for orders placed or performed during the hospital encounter of 02/10/20   Echo Color Flow Doppler? Yes   Result Value Ref Range    BSA 1.78 m2    LA WIDTH 3.53 cm    AORTIC VALVE CUSP SEPERATION 1.79 cm    PV PEAK VELOCITY 0.70 cm/s    LVIDd 4.38 3.5 - 6.0 cm    IVS 0.82 0.6 - 1.1 cm    Posterior Wall 1.06 0.6 - 1.1 cm    Ao root annulus 2.64 cm    LVIDs 3.12 2.1 - 4.0 cm    FS 29 28 - 44 %    LA volume 34.55 cm3    Sinus 3.04 cm     STJ 2.61 cm    Ascending aorta 2.64 cm    LV mass 134.79 g    LA size 2.93 cm    RVDD 2.58 cm    TAPSE 2.34 cm    RV S' 12.79 cm/s    Left Ventricle Relative Wall Thickness 0.48 cm    AV mean gradient 3 mmHg    AV valve area 2.34 cm2    AV Velocity Ratio 0.76     AV index (prosthetic) 0.66     E/A ratio 0.80     E wave deceleration time 203.97 msec    IVRT 0.11 msec    Pulm vein S/D ratio 1.60     LVOT diameter 2.12 cm    LVOT area 3.5 cm2    LVOT peak parvez 0.94 m/s    LVOT peak VTI 17.16 cm    Ao peak parvez 1.23 m/s    Ao VTI 25.89 cm    LVOT stroke volume 60.54 cm3    AV peak gradient 6 mmHg    MV Peak E Parvez 0.74 m/s    TR Max Parvez 1.92 m/s    MV Peak A Parvez 0.92 m/s    PV Peak S Parvez 0.64 m/s    PV Peak D Parvez 0.40 m/s    LV Systolic Volume 38.53 mL    LV Systolic Volume Index 22.3 mL/m2    LV Diastolic Volume 86.78 mL    LV Diastolic Volume Index 50.24 mL/m2    LA Volume Index 20.0 mL/m2    LV Mass Index 78 g/m2    RA Major Axis 3.57 cm    Left Atrium Minor Axis 3.91 cm    Left Atrium Major Axis 3.95 cm    Triscuspid Valve Regurgitation Peak Gradient 15 mmHg    RA Width 2.88 cm    Right Atrial Pressure (from IVC) 3 mmHg    TV rest pulmonary artery pressure 18 mmHg    Narrative    · Normal left ventricular systolic function. The estimated ejection   fraction is 65%.  · Normal LV diastolic function.  · Normal right ventricular systolic function.  · Moderate left atrial enlargement.  · Mild mitral regurgitation.  · The estimated PA systolic pressure is 18 mmHg.  · Normal central venous pressure (3 mmHg).        Assessment and Plan:     Chest pain, atypical  Mostly atypical. Troponin negative - improved with GI cocktail. EKG with new diffuse mild TWI. Echo and stress test today - ok for d/c today if negative for ischemia    Gastroesophageal reflux disease without esophagitis  Per primary    Essential hypertension  stable        VTE Risk Mitigation (From admission, onward)    None          Thank you for your consult. I will  follow-up with patient. Please contact us if you have any additional questions.    Jesse Pardo MD  Cardiology   [facility] - [place] - Emergency Dept

## 2022-09-09 NOTE — ED TRIAGE NOTES
Pt. Complains of feeling light headed and feeling like she has acid reflux, but over the counter medications are not working. Pt. Describes her pain as an indigestion feeling located in her epigastric area that radiates to her back. Pt. Also feels nauseated. Pt. Rates her pain at a 10/10 on the pain scale. Pt. Also complains of left handed numbness that has been going on for a while, but feels worse today. Pt. Has a hx of a stroke with left sided weakness that occurred in October 2020.

## 2022-09-23 NOTE — TELEPHONE ENCOUNTER
Spoke with patient she stated that the gabapentin is not working she is having pain and numbness on her left side shoulder to hand. What else can she do?              ----- Message from Jody Troncoso sent at 9/21/2020  8:30 AM CDT -----  Regarding: discuss medication  Type: Patient Call Back    Who called:Self  What is the request in detail:Patient would like to discuss medication. She stated it isn't working.    Can the clinic reply by [facility]? no    Would the patient rather a call back or a response via [facility]? Callback    Best call back [phone]        
no

## 2022-11-05 ENCOUNTER — HOSPITAL ENCOUNTER (EMERGENCY)
Facility: HOSPITAL | Age: 67
Discharge: HOME | End: 2022-11-05
Attending: STUDENT IN AN ORGANIZED HEALTH CARE EDUCATION/TRAINING PROGRAM
Payer: MEDICARE

## 2022-11-05 VITALS
OXYGEN SATURATION: 96 % | HEART RATE: 78 BPM | SYSTOLIC BLOOD PRESSURE: 130 MMHG | HEIGHT: 61 IN | DIASTOLIC BLOOD PRESSURE: 85 MMHG | BODY MASS INDEX: 30.21 KG/M2 | WEIGHT: 160 LBS | TEMPERATURE: 99 F | RESPIRATION RATE: 16 BRPM

## 2022-11-05 DIAGNOSIS — D21.9 FIBROIDS: ICD-10-CM

## 2022-11-05 DIAGNOSIS — K57.90 DIVERTICULOSIS: Primary | ICD-10-CM

## 2022-11-05 DIAGNOSIS — R10.9 ABDOMINAL PAIN, UNSPECIFIED ABDOMINAL LOCATION: ICD-10-CM

## 2022-11-05 LAB
ALBUMIN SERPL BCP-MCNC: 4.4 G/DL (ref 3.5–5.2)
ALP SERPL-CCNC: 69 U/L (ref 55–135)
ALT SERPL W/O P-5'-P-CCNC: 11 U/L (ref 10–44)
ANION GAP SERPL CALC-SCNC: 11 MMOL/L (ref 8–16)
ANION GAP SERPL CALC-SCNC: 15 MMOL/L (ref 8–16)
AST SERPL-CCNC: 17 U/L (ref 10–40)
BASOPHILS # BLD AUTO: 0.01 K/UL (ref 0–0.2)
BASOPHILS NFR BLD: 0.1 % (ref 0–1.9)
BILIRUB SERPL-MCNC: 1.8 MG/DL (ref 0.1–1)
BILIRUB UR QL STRIP: NEGATIVE
BUN SERPL-MCNC: 10 MG/DL (ref 8–23)
BUN SERPL-MCNC: 4 MG/DL (ref 6–30)
CALCIUM SERPL-MCNC: 10.1 MG/DL (ref 8.7–10.5)
CHLORIDE SERPL-SCNC: 105 MMOL/L (ref 95–110)
CHLORIDE SERPL-SCNC: 106 MMOL/L (ref 95–110)
CLARITY UR: CLEAR
CO2 SERPL-SCNC: 23 MMOL/L (ref 23–29)
COLOR UR: COLORLESS
CREAT SERPL-MCNC: 0.7 MG/DL (ref 0.5–1.4)
CREAT SERPL-MCNC: 0.8 MG/DL (ref 0.5–1.4)
CTP QC/QA: YES
DIFFERENTIAL METHOD: ABNORMAL
EOSINOPHIL # BLD AUTO: 0 K/UL (ref 0–0.5)
EOSINOPHIL NFR BLD: 0.1 % (ref 0–8)
ERYTHROCYTE [DISTWIDTH] IN BLOOD BY AUTOMATED COUNT: 13.8 % (ref 11.5–14.5)
EST. GFR  (NO RACE VARIABLE): >60 ML/MIN/1.73 M^2
GLUCOSE SERPL-MCNC: 107 MG/DL (ref 70–110)
GLUCOSE SERPL-MCNC: 108 MG/DL (ref 70–110)
GLUCOSE UR QL STRIP: NEGATIVE
HCT VFR BLD AUTO: 42.2 % (ref 37–48.5)
HCT VFR BLD CALC: 40 %PCV (ref 36–54)
HGB BLD-MCNC: 14 G/DL (ref 12–16)
HGB UR QL STRIP: NEGATIVE
IMM GRANULOCYTES # BLD AUTO: 0.01 K/UL (ref 0–0.04)
IMM GRANULOCYTES NFR BLD AUTO: 0.1 % (ref 0–0.5)
KETONES UR QL STRIP: NEGATIVE
LEUKOCYTE ESTERASE UR QL STRIP: NEGATIVE
LIPASE SERPL-CCNC: 22 U/L (ref 4–60)
LYMPHOCYTES # BLD AUTO: 1.3 K/UL (ref 1–4.8)
LYMPHOCYTES NFR BLD: 17.7 % (ref 18–48)
MCH RBC QN AUTO: 27.7 PG (ref 27–31)
MCHC RBC AUTO-ENTMCNC: 33.2 G/DL (ref 32–36)
MCV RBC AUTO: 84 FL (ref 82–98)
MONOCYTES # BLD AUTO: 0.6 K/UL (ref 0.3–1)
MONOCYTES NFR BLD: 7.8 % (ref 4–15)
NEUTROPHILS # BLD AUTO: 5.6 K/UL (ref 1.8–7.7)
NEUTROPHILS NFR BLD: 74.2 % (ref 38–73)
NITRITE UR QL STRIP: NEGATIVE
NRBC BLD-RTO: 0 /100 WBC
PH UR STRIP: 7 [PH] (ref 5–8)
PLATELET # BLD AUTO: 246 K/UL (ref 150–450)
PMV BLD AUTO: 9.8 FL (ref 9.2–12.9)
POC IONIZED CALCIUM: 1.27 MMOL/L (ref 1.06–1.42)
POC MOLECULAR INFLUENZA A AGN: NEGATIVE
POC MOLECULAR INFLUENZA B AGN: NEGATIVE
POC TCO2 (MEASURED): 23 MMOL/L (ref 23–29)
POCT GLUCOSE: 127 MG/DL (ref 70–110)
POTASSIUM BLD-SCNC: 3 MMOL/L (ref 3.5–5.1)
POTASSIUM SERPL-SCNC: 3.4 MMOL/L (ref 3.5–5.1)
PROT SERPL-MCNC: 7.9 G/DL (ref 6–8.4)
PROT UR QL STRIP: NEGATIVE
RBC # BLD AUTO: 5.05 M/UL (ref 4–5.4)
SAMPLE: ABNORMAL
SODIUM BLD-SCNC: 139 MMOL/L (ref 136–145)
SODIUM SERPL-SCNC: 140 MMOL/L (ref 136–145)
SP GR UR STRIP: 1 (ref 1–1.03)
URN SPEC COLLECT METH UR: ABNORMAL
UROBILINOGEN UR STRIP-ACNC: NEGATIVE EU/DL
WBC # BLD AUTO: 7.56 K/UL (ref 3.9–12.7)

## 2022-11-05 PROCEDURE — 83690 ASSAY OF LIPASE: CPT | Performed by: STUDENT IN AN ORGANIZED HEALTH CARE EDUCATION/TRAINING PROGRAM

## 2022-11-05 PROCEDURE — 96361 HYDRATE IV INFUSION ADD-ON: CPT

## 2022-11-05 PROCEDURE — 82565 ASSAY OF CREATININE: CPT | Mod: 91

## 2022-11-05 PROCEDURE — 96374 THER/PROPH/DIAG INJ IV PUSH: CPT

## 2022-11-05 PROCEDURE — 80053 COMPREHEN METABOLIC PANEL: CPT | Performed by: STUDENT IN AN ORGANIZED HEALTH CARE EDUCATION/TRAINING PROGRAM

## 2022-11-05 PROCEDURE — 84132 ASSAY OF SERUM POTASSIUM: CPT

## 2022-11-05 PROCEDURE — 85014 HEMATOCRIT: CPT | Mod: 91

## 2022-11-05 PROCEDURE — 63600175 PHARM REV CODE 636 W HCPCS: Performed by: STUDENT IN AN ORGANIZED HEALTH CARE EDUCATION/TRAINING PROGRAM

## 2022-11-05 PROCEDURE — 82962 GLUCOSE BLOOD TEST: CPT

## 2022-11-05 PROCEDURE — 81003 URINALYSIS AUTO W/O SCOPE: CPT | Performed by: STUDENT IN AN ORGANIZED HEALTH CARE EDUCATION/TRAINING PROGRAM

## 2022-11-05 PROCEDURE — 84295 ASSAY OF SERUM SODIUM: CPT | Mod: 91

## 2022-11-05 PROCEDURE — 25000003 PHARM REV CODE 250: Performed by: STUDENT IN AN ORGANIZED HEALTH CARE EDUCATION/TRAINING PROGRAM

## 2022-11-05 PROCEDURE — 96375 TX/PRO/DX INJ NEW DRUG ADDON: CPT

## 2022-11-05 PROCEDURE — 99900035 HC TECH TIME PER 15 MIN (STAT)

## 2022-11-05 PROCEDURE — 96376 TX/PRO/DX INJ SAME DRUG ADON: CPT

## 2022-11-05 PROCEDURE — 82330 ASSAY OF CALCIUM: CPT

## 2022-11-05 PROCEDURE — 80047 BASIC METABLC PNL IONIZED CA: CPT | Mod: 59

## 2022-11-05 PROCEDURE — 87502 INFLUENZA DNA AMP PROBE: CPT

## 2022-11-05 PROCEDURE — 85025 COMPLETE CBC W/AUTO DIFF WBC: CPT | Performed by: STUDENT IN AN ORGANIZED HEALTH CARE EDUCATION/TRAINING PROGRAM

## 2022-11-05 PROCEDURE — 99285 EMERGENCY DEPT VISIT HI MDM: CPT | Mod: 25

## 2022-11-05 PROCEDURE — 25500020 PHARM REV CODE 255: Performed by: STUDENT IN AN ORGANIZED HEALTH CARE EDUCATION/TRAINING PROGRAM

## 2022-11-05 RX ORDER — ONDANSETRON 2 MG/ML
4 INJECTION INTRAMUSCULAR; INTRAVENOUS
Status: COMPLETED | OUTPATIENT
Start: 2022-11-05 | End: 2022-11-05

## 2022-11-05 RX ORDER — ONDANSETRON 4 MG/1
4 TABLET, FILM COATED ORAL EVERY 6 HOURS
Qty: 12 TABLET | Refills: 0 | Status: SHIPPED | OUTPATIENT
Start: 2022-11-05

## 2022-11-05 RX ORDER — MORPHINE SULFATE 4 MG/ML
4 INJECTION, SOLUTION INTRAMUSCULAR; INTRAVENOUS
Status: COMPLETED | OUTPATIENT
Start: 2022-11-05 | End: 2022-11-05

## 2022-11-05 RX ORDER — FAMOTIDINE 20 MG/1
20 TABLET, FILM COATED ORAL 2 TIMES DAILY
Qty: 20 TABLET | Refills: 0 | Status: SHIPPED | OUTPATIENT
Start: 2022-11-05 | End: 2022-11-07 | Stop reason: ALTCHOICE

## 2022-11-05 RX ORDER — MAG HYDROX/ALUMINUM HYD/SIMETH 200-200-20
30 SUSPENSION, ORAL (FINAL DOSE FORM) ORAL ONCE
Status: DISCONTINUED | OUTPATIENT
Start: 2022-11-05 | End: 2022-11-05

## 2022-11-05 RX ORDER — MAG HYDROX/ALUMINUM HYD/SIMETH 200-200-20
30 SUSPENSION, ORAL (FINAL DOSE FORM) ORAL ONCE
Status: COMPLETED | OUTPATIENT
Start: 2022-11-05 | End: 2022-11-05

## 2022-11-05 RX ORDER — FAMOTIDINE 10 MG/ML
20 INJECTION INTRAVENOUS
Status: COMPLETED | OUTPATIENT
Start: 2022-11-05 | End: 2022-11-05

## 2022-11-05 RX ORDER — LIDOCAINE HYDROCHLORIDE 20 MG/ML
SOLUTION OROPHARYNGEAL EVERY 6 HOURS
Qty: 100 ML | Refills: 0 | Status: SHIPPED | OUTPATIENT
Start: 2022-11-05 | End: 2023-01-23

## 2022-11-05 RX ADMIN — ONDANSETRON 4 MG: 2 INJECTION INTRAMUSCULAR; INTRAVENOUS at 05:11

## 2022-11-05 RX ADMIN — IOHEXOL 100 ML: 350 INJECTION, SOLUTION INTRAVENOUS at 04:11

## 2022-11-05 RX ADMIN — ONDANSETRON 4 MG: 2 INJECTION INTRAMUSCULAR; INTRAVENOUS at 03:11

## 2022-11-05 RX ADMIN — SODIUM CHLORIDE 1000 ML: 0.9 INJECTION, SOLUTION INTRAVENOUS at 04:11

## 2022-11-05 RX ADMIN — FAMOTIDINE 20 MG: 10 INJECTION INTRAVENOUS at 05:11

## 2022-11-05 RX ADMIN — ALUMINUM HYDROXIDE, MAGNESIUM HYDROXIDE, AND DIMETHICONE 30 ML: 200; 20; 200 SUSPENSION ORAL at 05:11

## 2022-11-05 RX ADMIN — MORPHINE SULFATE 4 MG: 4 INJECTION INTRAVENOUS at 03:11

## 2022-11-05 NOTE — DISCHARGE INSTRUCTIONS
Return if your pain worsens, if you cannot keep any food down.  Take Zofran for nausea as directed.  Follow-up with gynecology for uterine fibroids.  Follow-up with gastroenterology for your diverticulosis.    Thank you for coming to our Emergency Department today. It is important to remember that some problems or medical conditions are difficult to diagnose and may not be found during your Emergency Department visit.     Be sure to follow up with your primary care doctor and review all labs/imaging/tests that were performed during your ER visit with them. Some labs/tests may be outside of the normal range and require non-emergent follow-up and further investigation to help diagnose/exclude/prevent complications or other potentially serious medical conditions that were not addressed during your ER visit.    If you do not have a primary care doctor, you may contact the one listed on your discharge paperwork or you may also call the [facility] Appointment Desk at [phone] to schedule an appointment and establish care with one. Another resources for finding primary care physicians: [email or web address] It is important to your health that you have a primary care doctor.    Please take all medications as directed. All medications may potentially have side-effects and it is impossible to predict which medications may give you side-effects or what side-effects (if any) they will give you. If you feel that you are having a negative effect or side-effect of any medication you should immediately stop taking them and seek medical attention. If you feel that you are having a life-threatening reaction call 911.    Return to the ER with any questions/concerns, new/concerning symptoms, worsening or failure to improve.     Do not drive, swim, climb to height, take a bath, operate heavy machinery, drink alcohol or take potentially sedating medications, sign any legal documents or make any important decisions for 24 hours if  you have received any pain medications, sedatives or mood altering drugs during your ER visit or within 24 hours of taking them if they have been prescribed to you.     You can find additional resources for Dentists, hearing aids, durable medical equipment, low cost pharmacies and other resources at [email or web address]

## 2022-11-05 NOTE — ED PROVIDER NOTES
Encounter Date: 11/5/2022       History     Chief Complaint   Patient presents with    Abdominal Pain     Pt c/o abdominal pain accompanied by nausea x 2 days. Pt also reports more noticeable numbness and weakness to left upper and lower extremities. History of previous stroke. Denies cp, sob, v/d. Stroke r/o in triage.    Numbness    Extremity Weakness     67-year-old female presents for evaluation of 3 day history of midepigastric abdominal pain, constant, nothing makes it better or worse associated with nausea and intermittent vomiting.  She is only tolerating clear liquids intermittently but cannot tolerate food.  She had diarrhea yesterday.  She denies dysuria hematuria urgency or frequency.  She has no history of prior abdominal surgeries.  She denies cough, fevers or chills.  She denies chest pain.    Review of patient's allergies indicates:   Allergen Reactions    Benadryl [diphenhydramine hcl]      cuase her bp to elevate    Prednisone Other (See Comments)     Reactions:  Increased blood pressure    Asa-calcium carb-mag-aluminum Other (See Comments)     Acid reflux  Acid reflux      Ibuprofen Other (See Comments)     Hx of stroke per patient. Patient  states that she cannot take.    Penicillins     Powder base no.200 Rash     Powder in gloves causes rash on hands, Denies any reaction to latex products     Past Medical History:   Diagnosis Date    Chronic pain     left arm    Stroke     2019     Past Surgical History:   Procedure Laterality Date    BUNIONECTOMY      LEG TENDON SURGERY Right      Family History   Problem Relation Age of Onset    Cancer Mother     No Known Problems Father      Social History     Tobacco Use    Smoking status: Never    Smokeless tobacco: Never   Substance Use Topics    Alcohol use: Not Currently     Comment: quit 10/19    Drug use: No     Review of Systems   Constitutional:  Negative for activity change, appetite change, chills, fever and unexpected weight change.   HENT:   Negative for dental problem and drooling.    Eyes:  Negative for discharge and itching.   Respiratory:  Negative for cough, chest tightness, shortness of breath, wheezing and stridor.    Cardiovascular:  Negative for chest pain, palpitations and leg swelling.   Gastrointestinal:  Positive for abdominal pain, nausea and vomiting. Negative for abdominal distention and diarrhea.   Genitourinary:  Negative for difficulty urinating, dysuria, frequency and urgency.   Musculoskeletal:  Negative for back pain, gait problem and joint swelling.   Neurological:  Negative for dizziness, syncope, numbness and headaches.   Psychiatric/Behavioral:  Negative for agitation, behavioral problems and confusion.      Physical Exam     Initial Vitals [11/05/22 1419]   BP Pulse Resp Temp SpO2   (!) 166/98 92 16 99.1 °F (37.3 °C) 97 %      MAP       --         Physical Exam    Nursing note and vitals reviewed.  Constitutional: She appears well-developed and well-nourished. She is not diaphoretic.   HENT:   Head: Normocephalic and atraumatic.   Mouth/Throat: Oropharynx is clear and moist.   Eyes: EOM are normal. Pupils are equal, round, and reactive to light. Right eye exhibits no discharge. Left eye exhibits no discharge.   Neck: No tracheal deviation present.   Normal range of motion.  Cardiovascular:  Normal rate, regular rhythm and intact distal pulses.           Pulmonary/Chest: No respiratory distress. She has no wheezes. She exhibits no tenderness.   Abdominal: Abdomen is soft. She exhibits no distension. There is abdominal tenderness.   Midepigastric tenderness without rebound guarding or masses.   Musculoskeletal:         General: No tenderness or edema. Normal range of motion.      Cervical back: Normal range of motion.     Neurological: She is alert and oriented to person, place, and time. She has normal strength. No cranial nerve deficit or sensory deficit. GCS eye subscore is 4. GCS verbal subscore is 5. GCS motor subscore is  6.   Skin: Skin is warm and dry. No rash noted.   Psychiatric: She has a normal mood and affect. Her behavior is normal. Thought content normal.       ED Course   Procedures  Labs Reviewed   CBC W/ AUTO DIFFERENTIAL - Abnormal; Notable for the following components:       Result Value    Gran % 74.2 (*)     Lymph % 17.7 (*)     All other components within normal limits   COMPREHENSIVE METABOLIC PANEL - Abnormal; Notable for the following components:    Potassium 3.4 (*)     Total Bilirubin 1.8 (*)     All other components within normal limits   URINALYSIS, REFLEX TO URINE CULTURE - Abnormal; Notable for the following components:    Color, UA Colorless (*)     All other components within normal limits    Narrative:     Specimen Source->Urine   POCT GLUCOSE - Abnormal; Notable for the following components:    POCT Glucose 127 (*)     All other components within normal limits   ISTAT PROCEDURE - Abnormal; Notable for the following components:    POC BUN 4 (*)     POC Potassium 3.0 (*)     All other components within normal limits   LIPASE   POCT INFLUENZA A/B MOLECULAR   ISTAT CHEM8          Imaging Results              CT Abdomen Pelvis With Contrast (Final result)  Result time 11/05/22 17:28:08      Final result by Kike Casey MD (11/05/22 17:28:08)                   Impression:      No infectious source in the abdomen or pelvis.    Diverticulosis coli without evidence of acute diverticulitis.    Enlarged uterus with multiple fibroids.    Additional findings as above.      Electronically signed by: Kike Casey MD  Date:    11/05/2022  Time:    17:28               Narrative:    EXAMINATION:  CT ABDOMEN PELVIS WITH CONTRAST    CLINICAL HISTORY:  Abdominal abscess/infection suspected;    TECHNIQUE:  Low dose axial images, sagittal and coronal reformations were obtained from the lung bases to the pubic symphysis following the IV administration of 100 mL of Omnipaque 350 .  Oral contrast was not  given.    COMPARISON:  None.    FINDINGS:  There are no pleural effusions.  No airspace opacity is present.  No pulmonary nodules identified.  No airspace opacity is present.    The heart is unremarkable.  There is normal tapering of the abdominal aorta.  The celiac trunk, SMA, and KOBE are within normal limits.  The portal veins and mesenteric veins are within normal limits.  The IVC and the remainder of the venous structures are within normal limits.    There is no evidence of lymphadenopathy in the abdomen or pelvis.    The esophagus, stomach, and duodenum are within normal limits.  The small bowel loops are unremarkable.  The appendix is within normal limits.  There is colonic diverticula without evidence of acute diverticulitis.  There is no CT evidence of bowel obstruction.    There is hypodensity in the right hepatic lobe.  This is too small complete characterization.  The liver is otherwise unremarkable.  The gallbladder is unremarkable.  The biliary tree is within normal limits.  The spleen is unremarkable.  The pancreas is within normal limits.  The adrenal glands are unremarkable.    The kidneys are unremarkable.  The ureters and urinary bladder are within normal limits.  The uterus is enlarged with multiple uterine fibroids.  The adnexal structures are unremarkable.    There is no evidence of free fluid in the abdomen or pelvis.  There is no evidence of free air.  There is no evidence of pneumatosis.  No portal venous air is identified.    The psoas margins are unremarkable.  The abdominal wall is within normal limits.  There are minimal degenerative changes in the osseous structures.                                       Medications   sodium chloride 0.9% bolus 1,000 mL (0 mLs Intravenous Stopped 11/5/22 1704)   morphine injection 4 mg (4 mg Intravenous Given 11/5/22 1552)   ondansetron injection 4 mg (4 mg Intravenous Given 11/5/22 1552)   iohexoL (OMNIPAQUE 350) injection 100 mL (100 mLs Intravenous  Given 11/5/22 1653)   ondansetron injection 4 mg (4 mg Intravenous Given 11/5/22 1756)   famotidine (PF) injection 20 mg (20 mg Intravenous Given 11/5/22 1756)   aluminum-magnesium hydroxide-simethicone 200-200-20 mg/5 mL suspension 30 mL (30 mLs Oral Given 11/5/22 1756)                 ED Course as of 11/05/22 1901   Sat Nov 05, 2022   1742 CT abdomen and pelvis notable for No infectious source in the abdomen or pelvis.     Diverticulosis coli without evidence of acute diverticulitis.     Enlarged uterus with multiple fibroids.    [BS]      ED Course User Index  [BS] Ramiro Banegas MD                 After complete evaluation, including thorough history and physical exam, the patient's symptoms are most consistent with benign cause of abdominal pain. There is no rebound/guarding or other peritoneal signs to suggest perforation or other emergent surgical process. There is no fever or leukocytosis to suggest acute bacterial infection. There is no significant focal abdominal tenderness to suggest cholecystitis, appendicitis, diverticulitis, or  source, and the patient's current symptoms and clinical presentation do not warrant other targeted diagnostics at this time. UA inconsistent with UTI. CT A/P without evidence of for acute surgical abnormality. She has fibroids and diverticulosis; discussed those findings with the patient.  Symptoms improved with GI cocktail, Pepcid.  She is tolerating p.o..  She is stable for discharge with outpatient follow-up return precautions.  She is comfortable with the plan.    Clinical Impression:   Final diagnoses:  [K57.90] Diverticulosis (Primary)  [D21.9] Fibroids  [R10.9] Abdominal pain, unspecified abdominal location      ED Disposition Condition    Discharge Stable          ED Prescriptions       Medication Sig Dispense Start Date End Date Auth. Provider    ondansetron (ZOFRAN) 4 MG tablet Take 1 tablet (4 mg total) by mouth every 6 (six) hours. 12 tablet 11/5/2022 -- Beau  GODWIN Banegas MD    LIDOcaine HCl 2% (XYLOCAINE) 2 % Soln Take by mouth every 6 (six) hours. 100 mL 11/5/2022 -- Ramiro Banegas MD    famotidine (PEPCID) 20 MG tablet Take 1 tablet (20 mg total) by mouth 2 (two) times daily. for 10 days 20 tablet 11/5/2022 11/15/2022 Ramiro Banegas MD          Follow-up Information       Follow up With Specialties Details Why Contact Info    Yvon Good MD Internal Medicine Call on 11/7/2022 To recheck today's symptoms [address] [facility] [address]  [phone]               Ramiro Banegas MD  11/05/22 0595

## 2022-11-07 ENCOUNTER — HOSPITAL ENCOUNTER (EMERGENCY)
Facility: HOSPITAL | Age: 67
Discharge: HOME | End: 2022-11-07
Attending: EMERGENCY MEDICINE
Payer: MEDICARE

## 2022-11-07 VITALS
WEIGHT: 160 LBS | TEMPERATURE: 99 F | DIASTOLIC BLOOD PRESSURE: 80 MMHG | OXYGEN SATURATION: 97 % | SYSTOLIC BLOOD PRESSURE: 141 MMHG | HEART RATE: 72 BPM | HEIGHT: 61 IN | RESPIRATION RATE: 30 BRPM | BODY MASS INDEX: 30.21 KG/M2

## 2022-11-07 DIAGNOSIS — R10.13 ABDOMINAL PAIN, ACUTE, EPIGASTRIC: Primary | ICD-10-CM

## 2022-11-07 DIAGNOSIS — F41.9 ANXIETY: ICD-10-CM

## 2022-11-07 LAB
ALBUMIN SERPL BCP-MCNC: 4.5 G/DL (ref 3.5–5.2)
ALP SERPL-CCNC: 69 U/L (ref 55–135)
ALT SERPL W/O P-5'-P-CCNC: 10 U/L (ref 10–44)
ANION GAP SERPL CALC-SCNC: 12 MMOL/L (ref 8–16)
AST SERPL-CCNC: 17 U/L (ref 10–40)
BASOPHILS # BLD AUTO: 0.02 K/UL (ref 0–0.2)
BASOPHILS NFR BLD: 0.3 % (ref 0–1.9)
BILIRUB SERPL-MCNC: 2.2 MG/DL (ref 0.1–1)
BILIRUB UR QL STRIP: NEGATIVE
BUN SERPL-MCNC: 7 MG/DL (ref 8–23)
CALCIUM SERPL-MCNC: 10.3 MG/DL (ref 8.7–10.5)
CHLORIDE SERPL-SCNC: 104 MMOL/L (ref 95–110)
CLARITY UR: CLEAR
CO2 SERPL-SCNC: 24 MMOL/L (ref 23–29)
COLOR UR: YELLOW
CREAT SERPL-MCNC: 0.8 MG/DL (ref 0.5–1.4)
DIFFERENTIAL METHOD: ABNORMAL
EOSINOPHIL # BLD AUTO: 0 K/UL (ref 0–0.5)
EOSINOPHIL NFR BLD: 0.2 % (ref 0–8)
ERYTHROCYTE [DISTWIDTH] IN BLOOD BY AUTOMATED COUNT: 13.9 % (ref 11.5–14.5)
EST. GFR  (NO RACE VARIABLE): >60 ML/MIN/1.73 M^2
GLUCOSE SERPL-MCNC: 101 MG/DL (ref 70–110)
GLUCOSE UR QL STRIP: NEGATIVE
HCT VFR BLD AUTO: 43.5 % (ref 37–48.5)
HGB BLD-MCNC: 13.7 G/DL (ref 12–16)
HGB UR QL STRIP: NEGATIVE
IMM GRANULOCYTES # BLD AUTO: 0.02 K/UL (ref 0–0.04)
IMM GRANULOCYTES NFR BLD AUTO: 0.3 % (ref 0–0.5)
KETONES UR QL STRIP: ABNORMAL
LEUKOCYTE ESTERASE UR QL STRIP: NEGATIVE
LIPASE SERPL-CCNC: 23 U/L (ref 4–60)
LYMPHOCYTES # BLD AUTO: 1.6 K/UL (ref 1–4.8)
LYMPHOCYTES NFR BLD: 27 % (ref 18–48)
MCH RBC QN AUTO: 26.4 PG (ref 27–31)
MCHC RBC AUTO-ENTMCNC: 31.5 G/DL (ref 32–36)
MCV RBC AUTO: 84 FL (ref 82–98)
MONOCYTES # BLD AUTO: 0.5 K/UL (ref 0.3–1)
MONOCYTES NFR BLD: 7.6 % (ref 4–15)
NEUTROPHILS # BLD AUTO: 3.8 K/UL (ref 1.8–7.7)
NEUTROPHILS NFR BLD: 64.6 % (ref 38–73)
NITRITE UR QL STRIP: NEGATIVE
NRBC BLD-RTO: 0 /100 WBC
PH UR STRIP: 7 [PH] (ref 5–8)
PLATELET # BLD AUTO: 246 K/UL (ref 150–450)
PMV BLD AUTO: 10.4 FL (ref 9.2–12.9)
POTASSIUM SERPL-SCNC: 3.2 MMOL/L (ref 3.5–5.1)
PROT SERPL-MCNC: 8.1 G/DL (ref 6–8.4)
PROT UR QL STRIP: NEGATIVE
RBC # BLD AUTO: 5.19 M/UL (ref 4–5.4)
SODIUM SERPL-SCNC: 140 MMOL/L (ref 136–145)
SP GR UR STRIP: 1.01 (ref 1–1.03)
URN SPEC COLLECT METH UR: ABNORMAL
UROBILINOGEN UR STRIP-ACNC: NEGATIVE EU/DL
WBC # BLD AUTO: 5.93 K/UL (ref 3.9–12.7)

## 2022-11-07 PROCEDURE — 25000003 PHARM REV CODE 250: Performed by: EMERGENCY MEDICINE

## 2022-11-07 PROCEDURE — 83690 ASSAY OF LIPASE: CPT | Performed by: EMERGENCY MEDICINE

## 2022-11-07 PROCEDURE — 96374 THER/PROPH/DIAG INJ IV PUSH: CPT

## 2022-11-07 PROCEDURE — 81003 URINALYSIS AUTO W/O SCOPE: CPT | Performed by: STUDENT IN AN ORGANIZED HEALTH CARE EDUCATION/TRAINING PROGRAM

## 2022-11-07 PROCEDURE — 96372 THER/PROPH/DIAG INJ SC/IM: CPT | Performed by: EMERGENCY MEDICINE

## 2022-11-07 PROCEDURE — 63600175 PHARM REV CODE 636 W HCPCS: Performed by: EMERGENCY MEDICINE

## 2022-11-07 PROCEDURE — 96375 TX/PRO/DX INJ NEW DRUG ADDON: CPT

## 2022-11-07 PROCEDURE — 96361 HYDRATE IV INFUSION ADD-ON: CPT

## 2022-11-07 PROCEDURE — C9113 INJ PANTOPRAZOLE SODIUM, VIA: HCPCS | Performed by: EMERGENCY MEDICINE

## 2022-11-07 PROCEDURE — 80053 COMPREHEN METABOLIC PANEL: CPT | Performed by: EMERGENCY MEDICINE

## 2022-11-07 PROCEDURE — 85025 COMPLETE CBC W/AUTO DIFF WBC: CPT | Performed by: STUDENT IN AN ORGANIZED HEALTH CARE EDUCATION/TRAINING PROGRAM

## 2022-11-07 PROCEDURE — 99284 EMERGENCY DEPT VISIT MOD MDM: CPT | Mod: 25

## 2022-11-07 RX ORDER — PANTOPRAZOLE SODIUM 40 MG/1
TABLET, DELAYED RELEASE ORAL
Qty: 30 TABLET | Refills: 0 | Status: SHIPPED | OUTPATIENT
Start: 2022-11-07

## 2022-11-07 RX ORDER — HYDROMORPHONE HYDROCHLORIDE 1 MG/ML
1 INJECTION, SOLUTION INTRAMUSCULAR; INTRAVENOUS; SUBCUTANEOUS
Status: COMPLETED | OUTPATIENT
Start: 2022-11-07 | End: 2022-11-07

## 2022-11-07 RX ORDER — DICYCLOMINE HYDROCHLORIDE 10 MG/ML
20 INJECTION INTRAMUSCULAR
Status: COMPLETED | OUTPATIENT
Start: 2022-11-07 | End: 2022-11-07

## 2022-11-07 RX ORDER — ONDANSETRON 2 MG/ML
4 INJECTION INTRAMUSCULAR; INTRAVENOUS
Status: COMPLETED | OUTPATIENT
Start: 2022-11-07 | End: 2022-11-07

## 2022-11-07 RX ORDER — PANTOPRAZOLE SODIUM 40 MG/10ML
80 INJECTION, POWDER, LYOPHILIZED, FOR SOLUTION INTRAVENOUS
Status: COMPLETED | OUTPATIENT
Start: 2022-11-07 | End: 2022-11-07

## 2022-11-07 RX ORDER — LORAZEPAM 0.5 MG/1
1 TABLET ORAL EVERY 6 HOURS PRN
Qty: 4 TABLET | Refills: 0 | Status: SHIPPED | OUTPATIENT
Start: 2022-11-07 | End: 2024-03-23 | Stop reason: SDUPTHER

## 2022-11-07 RX ORDER — MAG HYDROX/ALUMINUM HYD/SIMETH 200-200-20
60 SUSPENSION, ORAL (FINAL DOSE FORM) ORAL
Status: COMPLETED | OUTPATIENT
Start: 2022-11-07 | End: 2022-11-07

## 2022-11-07 RX ORDER — ONDANSETRON 2 MG/ML
8 INJECTION INTRAMUSCULAR; INTRAVENOUS
Status: DISCONTINUED | OUTPATIENT
Start: 2022-11-07 | End: 2022-11-07

## 2022-11-07 RX ORDER — DIAZEPAM 10 MG/2ML
5 INJECTION INTRAMUSCULAR
Status: COMPLETED | OUTPATIENT
Start: 2022-11-07 | End: 2022-11-07

## 2022-11-07 RX ADMIN — SODIUM CHLORIDE 1000 ML: 0.9 INJECTION, SOLUTION INTRAVENOUS at 06:11

## 2022-11-07 RX ADMIN — HYDROMORPHONE HYDROCHLORIDE 1 MG: 1 INJECTION, SOLUTION INTRAMUSCULAR; INTRAVENOUS; SUBCUTANEOUS at 07:11

## 2022-11-07 RX ADMIN — ONDANSETRON 4 MG: 2 INJECTION INTRAMUSCULAR; INTRAVENOUS at 07:11

## 2022-11-07 RX ADMIN — PANTOPRAZOLE SODIUM 80 MG: 40 INJECTION, POWDER, FOR SOLUTION INTRAVENOUS at 06:11

## 2022-11-07 RX ADMIN — DICYCLOMINE HYDROCHLORIDE 20 MG: 20 INJECTION, SOLUTION INTRAMUSCULAR at 06:11

## 2022-11-07 RX ADMIN — DIAZEPAM 5 MG: 5 INJECTION, SOLUTION INTRAMUSCULAR; INTRAVENOUS at 09:11

## 2022-11-07 RX ADMIN — ALUMINUM HYDROXIDE, MAGNESIUM HYDROXIDE, AND DIMETHICONE 60 ML: 200; 20; 200 SUSPENSION ORAL at 06:11

## 2022-11-07 NOTE — ED NOTES
"Patient reporting abdominal jitters stating that her stomach is "nervous" and requesting something to help ease her nerves. Denies pain. MD notified.  "

## 2022-11-07 NOTE — ED NOTES
Assumed care. Patient lying awake in bed. RN at bedside. Updated on plan of care. Denies any needs at this time.

## 2022-11-07 NOTE — DISCHARGE INSTRUCTIONS
Please avoid caffeine carbonation alcohol cigarette citrus tomato Naprosyn aspirin ibuprofen.  Please use Mylanta 30 mL by mouth every 4 hours when you have abdominal discomfort.  Pantoprazole as directed.  Ativan for anxiety.  Please follow-up with the primary care doctor this week.  Return immediately if you get worse or if new problems develop.

## 2022-11-07 NOTE — ED PROVIDER NOTES
"Encounter Date: 11/7/2022    SCRIBE #1 NOTE: I, Siri De La Rosa, am scribing for, and in the presence of,  Nilton Newell MD. I have scribed the following portions of the note - Other sections scribed: HPI, ROS.     History     Chief Complaint   Patient presents with    Abdominal Pain     Presents with complaint of abdominal pain similar to when seen here 2 days ago and and diagnosed with diverticulosis and fibroids     This 67 y.o female, with a medical history of Chronic pain and Stroke, presents to the ED c/o acute, constant, severe (10/10) epigastric abdominal pain. Pt describes the pain as a "heaviness", noting that it is located "mostly in the pit of my stomach." She also reports experiencing 1x episode of diarrhea and mild chills. No previous episodes of similar symptoms. No history of abdominal surgeries, pancreatitis, liver disease, ulcers, or GI bleeding. She denies emesis, black or bloody stool, dysuria, fever, ear pain, eye pain, sore throat, chest pain, cough or shortness of breath. No other associated symptoms.    The history is provided by the patient.   Review of patient's allergies indicates:   Allergen Reactions    Benadryl [diphenhydramine hcl]      cuase her bp to elevate    Prednisone Other (See Comments)     Reactions:  Increased blood pressure    Asa-calcium carb-mag-aluminum Other (See Comments)     Acid reflux  Acid reflux      Ibuprofen Other (See Comments)     Hx of stroke per patient. Patient  states that she cannot take.    Penicillins     Powder base no.200 Rash     Powder in gloves causes rash on hands, Denies any reaction to latex products     Past Medical History:   Diagnosis Date    Chronic pain     left arm    Stroke     2019     Past Surgical History:   Procedure Laterality Date    BUNIONECTOMY      LEG TENDON SURGERY Right      Family History   Problem Relation Age of Onset    Cancer Mother     No Known Problems Father      Social History     Tobacco Use    Smoking status: Never "    Smokeless tobacco: Never   Substance Use Topics    Alcohol use: Not Currently     Comment: quit 10/19    Drug use: No     Review of Systems   Constitutional:  Positive for chills. Negative for fever.   HENT:  Negative for ear pain and sore throat.    Eyes:  Negative for pain.   Respiratory:  Negative for cough and shortness of breath.    Cardiovascular:  Negative for chest pain.   Gastrointestinal:  Positive for abdominal pain (epigastric) and diarrhea. Negative for blood in stool, nausea and vomiting.   Genitourinary:  Negative for dysuria.   Musculoskeletal:  Negative for back pain.   Skin:  Negative for rash.   Neurological:  Negative for weakness.     Physical Exam     Initial Vitals [11/07/22 0532]   BP Pulse Resp Temp SpO2   (!) 143/80 95 (!) 22 98.7 °F (37.1 °C) 99 %      MAP       --         Physical Exam  The patient was examined specifically for the following:   General:No significant distress, Good color, Warm and dry. Head and neck:Scalp atraumatic, Neck supple. Neurological:Appropriate conversation, Gross motor deficits. Eyes:Conjugate gaze, Clear corneas. ENT: No epistaxis. Cardiac: Regular rate and rhythm, Grossly normal heart tones. Pulmonary: Wheezing, Rales. Gastrointestinal: Abdominal tenderness, Abdominal distention. Musculoskeletal: Extremity deformity, Apparent pain with range of motion of the joints. Skin: Rash.   The findings on examination were normal except for the following:  The patient is very mild epigastric tenderness.  There is no guarding rebound mass or distention.  The patient is obviously uncomfortable.  ED Course   Procedures  Labs Reviewed   CBC W/ AUTO DIFFERENTIAL - Abnormal; Notable for the following components:       Result Value    MCH 26.4 (*)     MCHC 31.5 (*)     All other components within normal limits   URINALYSIS, REFLEX TO URINE CULTURE - Abnormal; Notable for the following components:    Ketones, UA Trace (*)     All other components within normal limits     Narrative:     Specimen Source->Urine   COMPREHENSIVE METABOLIC PANEL - Abnormal; Notable for the following components:    Potassium 3.2 (*)     BUN 7 (*)     Total Bilirubin 2.2 (*)     All other components within normal limits   LIPASE          Imaging Results    None       Medical decision making: Given the above this patient presented to the emergency room with epigastric abdominal pain.  She was treated with pantoprazole Mylanta morphine.  Her symptoms improved and resolved.  Patient later complained of anxiety.  I will discharge to outpatient evaluation and treatment.  I have patient follow up with primary care.  I will treat with pantoprazole and Mylanta.  There is no clinical evidence of peritonitis or bowel obstruction.       Medications   aluminum-magnesium hydroxide-simethicone 200-200-20 mg/5 mL suspension 60 mL (60 mLs Oral Given 11/7/22 0655)   HYDROmorphone injection 1 mg (1 mg Intravenous Given 11/7/22 0701)   dicyclomine injection 20 mg (20 mg Intramuscular Given 11/7/22 0658)   sodium chloride 0.9% bolus 1,000 mL (0 mLs Intravenous Stopped 11/7/22 0908)   pantoprazole injection 80 mg (80 mg Intravenous Given 11/7/22 0656)   ondansetron injection 4 mg (4 mg Intravenous Given 11/7/22 0701)   diazePAM injection 5 mg (5 mg Intravenous Given 11/7/22 0950)              Scribe Attestation:   Scribe #1: I performed the above scribed service and the documentation accurately describes the services I performed. I attest to the accuracy of the note.                 Scribe I personally performed the services described in this documentation.  All medical record  entries made by the scribe are at my direction and in my presence.  Signed, Dr. Newell  Clinical Impression:   Final diagnoses:  [R10.13] Abdominal pain, acute, epigastric (Primary)  [F41.9] Anxiety      ED Disposition Condition    Discharge Stable          ED Prescriptions       Medication Sig Dispense Start Date End Date Auth. Provider    pantoprazole  (PROTONIX) 40 MG tablet Please take 1 tablet by mouth every 12 hours while you have abdominal discomfort, then take 1 tablet every day. 30 tablet 11/7/2022 -- Nilton Newell MD    LORazepam (ATIVAN) 0.5 MG tablet Take 2 tablets (1 mg total) by mouth every 6 (six) hours as needed for Anxiety. 4 tablet 11/7/2022 12/7/2022 Nilton Newell MD          Follow-up Information       Follow up With Specialties Details Why Contact Info    Yvon Good MD Internal Medicine In 3 days  [address] [facility] [address]  [phone]               Nilton Newell MD  11/07/22 3209

## 2022-11-10 ENCOUNTER — TELEPHONE (OUTPATIENT)
Dept: NEUROLOGY | Facility: CLINIC | Age: 67
End: 2022-11-10
Payer: MEDICARE

## 2022-11-10 DIAGNOSIS — M54.12 CERVICAL RADICULOPATHY: ICD-10-CM

## 2022-11-10 RX ORDER — PREGABALIN 100 MG/1
100 CAPSULE ORAL 3 TIMES DAILY
Qty: 90 CAPSULE | Refills: 6 | Status: SHIPPED | OUTPATIENT
Start: 2022-11-10 | End: 2023-06-09

## 2022-11-10 NOTE — TELEPHONE ENCOUNTER
----- Message from Ximena Davis sent at 11/10/2022  9:24 AM CST -----  Type: Patient Call Back    Who called: Self     What is the request in detail: PT calling about medication that was supposed to be refilled for her states she spoken with someone on yesterday     Can the clinic reply by [facility]? No     Would the patient rather a call back or a response via [facility]? Call     Best call back number: [phone]          You were supposed  to send in a lower dosage

## 2023-01-23 ENCOUNTER — OFFICE VISIT (OUTPATIENT)
Dept: OBSTETRICS AND GYNECOLOGY | Facility: CLINIC | Age: 68
End: 2023-01-23
Payer: MEDICARE

## 2023-01-23 VITALS
DIASTOLIC BLOOD PRESSURE: 82 MMHG | WEIGHT: 159.75 LBS | BODY MASS INDEX: 30.18 KG/M2 | SYSTOLIC BLOOD PRESSURE: 138 MMHG

## 2023-01-23 DIAGNOSIS — Z12.4 ENCOUNTER FOR PAPANICOLAOU SMEAR FOR CERVICAL CANCER SCREENING: ICD-10-CM

## 2023-01-23 DIAGNOSIS — Z78.0 POSTMENOPAUSAL: ICD-10-CM

## 2023-01-23 DIAGNOSIS — Z11.51 SCREENING FOR HUMAN PAPILLOMAVIRUS (HPV): ICD-10-CM

## 2023-01-23 DIAGNOSIS — Z01.419 WOMEN'S ANNUAL ROUTINE GYNECOLOGICAL EXAMINATION: Primary | ICD-10-CM

## 2023-01-23 DIAGNOSIS — Z12.39 SCREENING BREAST EXAMINATION: ICD-10-CM

## 2023-01-23 DIAGNOSIS — Z80.41 FH: OVARIAN CANCER IN FIRST DEGREE RELATIVE: ICD-10-CM

## 2023-01-23 PROCEDURE — 1160F PR REVIEW ALL MEDS BY PRESCRIBER/CLIN PHARMACIST DOCUMENTED: ICD-10-PCS | Mod: CPTII,S$GLB,, | Performed by: PHYSICIAN ASSISTANT

## 2023-01-23 PROCEDURE — 1101F PT FALLS ASSESS-DOCD LE1/YR: CPT | Mod: CPTII,S$GLB,, | Performed by: PHYSICIAN ASSISTANT

## 2023-01-23 PROCEDURE — G0101 CA SCREEN;PELVIC/BREAST EXAM: HCPCS | Mod: S$GLB,,, | Performed by: PHYSICIAN ASSISTANT

## 2023-01-23 PROCEDURE — 1126F PR PAIN SEVERITY QUANTIFIED, NO PAIN PRESENT: ICD-10-PCS | Mod: CPTII,S$GLB,, | Performed by: PHYSICIAN ASSISTANT

## 2023-01-23 PROCEDURE — G0101 PR CA SCREEN;PELVIC/BREAST EXAM: ICD-10-PCS | Mod: S$GLB,,, | Performed by: PHYSICIAN ASSISTANT

## 2023-01-23 PROCEDURE — 1159F PR MEDICATION LIST DOCUMENTED IN MEDICAL RECORD: ICD-10-PCS | Mod: CPTII,S$GLB,, | Performed by: PHYSICIAN ASSISTANT

## 2023-01-23 PROCEDURE — 3288F FALL RISK ASSESSMENT DOCD: CPT | Mod: CPTII,S$GLB,, | Performed by: PHYSICIAN ASSISTANT

## 2023-01-23 PROCEDURE — 99999 PR PBB SHADOW E&M-EST. PATIENT-LVL IV: CPT | Mod: PBBFAC,,, | Performed by: PHYSICIAN ASSISTANT

## 2023-01-23 PROCEDURE — 88175 CYTOPATH C/V AUTO FLUID REDO: CPT | Performed by: PHYSICIAN ASSISTANT

## 2023-01-23 PROCEDURE — 3288F PR FALLS RISK ASSESSMENT DOCUMENTED: ICD-10-PCS | Mod: CPTII,S$GLB,, | Performed by: PHYSICIAN ASSISTANT

## 2023-01-23 PROCEDURE — 1101F PR PT FALLS ASSESS DOC 0-1 FALLS W/OUT INJ PAST YR: ICD-10-PCS | Mod: CPTII,S$GLB,, | Performed by: PHYSICIAN ASSISTANT

## 2023-01-23 PROCEDURE — 3008F BODY MASS INDEX DOCD: CPT | Mod: CPTII,S$GLB,, | Performed by: PHYSICIAN ASSISTANT

## 2023-01-23 PROCEDURE — 3079F PR MOST RECENT DIASTOLIC BLOOD PRESSURE 80-89 MM HG: ICD-10-PCS | Mod: CPTII,S$GLB,, | Performed by: PHYSICIAN ASSISTANT

## 2023-01-23 PROCEDURE — 1126F AMNT PAIN NOTED NONE PRSNT: CPT | Mod: CPTII,S$GLB,, | Performed by: PHYSICIAN ASSISTANT

## 2023-01-23 PROCEDURE — 3075F PR MOST RECENT SYSTOLIC BLOOD PRESS GE 130-139MM HG: ICD-10-PCS | Mod: CPTII,S$GLB,, | Performed by: PHYSICIAN ASSISTANT

## 2023-01-23 PROCEDURE — 3008F PR BODY MASS INDEX (BMI) DOCUMENTED: ICD-10-PCS | Mod: CPTII,S$GLB,, | Performed by: PHYSICIAN ASSISTANT

## 2023-01-23 PROCEDURE — 1159F MED LIST DOCD IN RCRD: CPT | Mod: CPTII,S$GLB,, | Performed by: PHYSICIAN ASSISTANT

## 2023-01-23 PROCEDURE — 1160F RVW MEDS BY RX/DR IN RCRD: CPT | Mod: CPTII,S$GLB,, | Performed by: PHYSICIAN ASSISTANT

## 2023-01-23 PROCEDURE — 3075F SYST BP GE 130 - 139MM HG: CPT | Mod: CPTII,S$GLB,, | Performed by: PHYSICIAN ASSISTANT

## 2023-01-23 PROCEDURE — 3079F DIAST BP 80-89 MM HG: CPT | Mod: CPTII,S$GLB,, | Performed by: PHYSICIAN ASSISTANT

## 2023-01-23 PROCEDURE — 87624 HPV HI-RISK TYP POOLED RSLT: CPT | Performed by: PHYSICIAN ASSISTANT

## 2023-01-23 PROCEDURE — 99999 PR PBB SHADOW E&M-EST. PATIENT-LVL IV: ICD-10-PCS | Mod: PBBFAC,,, | Performed by: PHYSICIAN ASSISTANT

## 2023-01-23 RX ORDER — VENLAFAXINE HYDROCHLORIDE 37.5 MG/1
37.5 CAPSULE, EXTENDED RELEASE ORAL DAILY
COMMUNITY
End: 2023-02-24 | Stop reason: SDUPTHER

## 2023-01-23 RX ORDER — BENZONATATE 100 MG/1
100 CAPSULE ORAL
COMMUNITY
Start: 2022-09-01

## 2023-01-23 RX ORDER — ESOMEPRAZOLE MAGNESIUM 40 MG/1
40 CAPSULE, DELAYED RELEASE ORAL
COMMUNITY
End: 2024-03-30

## 2023-01-23 RX ORDER — CETIRIZINE HYDROCHLORIDE 10 MG/1
10 TABLET ORAL DAILY
COMMUNITY

## 2023-01-23 RX ORDER — ATORVASTATIN CALCIUM 40 MG/1
40 TABLET, FILM COATED ORAL DAILY
COMMUNITY

## 2023-01-23 RX ORDER — TIZANIDINE 4 MG/1
4 TABLET ORAL EVERY 6 HOURS PRN
COMMUNITY

## 2023-01-23 RX ORDER — AMLODIPINE BESYLATE 10 MG/1
10 TABLET ORAL DAILY
COMMUNITY

## 2023-01-23 RX ORDER — FLUTICASONE PROPIONATE 50 MCG
1 SPRAY, SUSPENSION (ML) NASAL DAILY
COMMUNITY

## 2023-01-23 RX ORDER — TRAZODONE HYDROCHLORIDE 100 MG/1
100 TABLET ORAL NIGHTLY
COMMUNITY

## 2023-01-23 RX ORDER — LORATADINE 10 MG/1
10 TABLET ORAL DAILY
COMMUNITY

## 2023-01-23 RX ORDER — AMLODIPINE BESYLATE AND ATORVASTATIN CALCIUM 10; 10 MG/1; MG/1
TABLET, FILM COATED ORAL
COMMUNITY
End: 2023-01-23 | Stop reason: CLARIF

## 2023-01-23 NOTE — PROGRESS NOTES
HISTORY OF PRESENT ILLNESS:    Grazyna Durand is a 67 y.o. female  with a history of a stroke, presents for a routine exam.  No GYN complaints. She denies vaginal bleeding, vasomotor symptoms, vaginal dryness.  She does not want STD screening.    The patient participates in regular exercise: yes.  The patient does not smoke.  The patient wears seatbelts.   Pt denies any domestic violence.  No tattoos or blood transfusions.     SCREENING HISTORY:  PAP: none on file   MAMMOGRAM: none on file   - states doing it this month   COLONOSCOPY: none on file - reports cologard 5 months ago    Gyn FH:  Breast cancer: none  Colon cancer: none  Ovarian cancer: sister  Endometrial cancer: none    Past Medical History:   Diagnosis Date    Chronic pain     left arm    Hyperlipidemia     Hypertension     Stroke     2019       Past Surgical History:   Procedure Laterality Date    BUNIONECTOMY      LEG TENDON SURGERY Right         MEDICATIONS AND ALLERGIES:      Current Outpatient Medications:     amLODIPine (NORVASC) 10 MG tablet, Take 10 mg by mouth once daily., Disp: , Rfl:     atorvastatin (LIPITOR) 40 MG tablet, Take 40 mg by mouth once daily., Disp: , Rfl:     benzonatate (TESSALON) 100 MG capsule, Take 100 mg by mouth., Disp: , Rfl:     cetirizine (ZYRTEC) 10 MG tablet, Take 10 mg by mouth once daily., Disp: , Rfl:     esomeprazole (NEXIUM) 40 MG capsule, Take 40 mg by mouth before breakfast., Disp: , Rfl:     fluticasone propionate (FLONASE) 50 mcg/actuation nasal spray, 1 spray by Each Nostril route once daily., Disp: , Rfl:     loratadine (CLARITIN) 10 mg tablet, Take 10 mg by mouth once daily., Disp: , Rfl:     ondansetron (ZOFRAN) 4 MG tablet, Take 1 tablet (4 mg total) by mouth every 6 (six) hours., Disp: 12 tablet, Rfl: 0    pantoprazole (PROTONIX) 40 MG tablet, Please take 1 tablet by mouth every 12 hours while you have abdominal discomfort, then take 1 tablet every day., Disp: 30 tablet, Rfl: 0    pregabalin  (LYRICA) 100 MG capsule, Take 1 capsule (100 mg total) by mouth 3 (three) times daily., Disp: 90 capsule, Rfl: 6    tiZANidine (ZANAFLEX) 4 MG tablet, Take 4 mg by mouth every 6 (six) hours as needed., Disp: , Rfl:     traZODone (DESYREL) 100 MG tablet, Take 100 mg by mouth every evening., Disp: , Rfl:     venlafaxine (EFFEXOR-XR) 37.5 MG 24 hr capsule, Take 37.5 mg by mouth once daily., Disp: , Rfl:     amitriptyline (ELAVIL) 25 MG tablet, Take 1 tablet (25 mg total) by mouth every evening. Begin with one half tablet nightly for one week. (Patient not taking: Reported on 11/5/2022), Disp: 30 tablet, Rfl: 11    amlodipine-atorvastatin (CADUET) 10-10 mg per tablet, amlodipine Take No date recorded No form recorded No frequency recorded No route recorded No set duration recorded No set duration amount recorded active No dosage strength recorded No dosage strength units of measure recorded, Disp: , Rfl:     LIDOcaine (LIDODERM) 5 %, Place 1 patch onto the skin daily as needed (pain). Remove & Discard patch within 12 hours or as directed by MD (Patient not taking: Reported on 1/23/2023), Disp: 300 patch, Rfl: 0    LIDOcaine HCl 2% (XYLOCAINE) 2 % Soln, Take by mouth every 6 (six) hours. (Patient not taking: Reported on 1/23/2023), Disp: 100 mL, Rfl: 0    LORazepam (ATIVAN) 0.5 MG tablet, Take 2 tablets (1 mg total) by mouth every 6 (six) hours as needed for Anxiety., Disp: 4 tablet, Rfl: 0    Current Facility-Administered Medications:     acetaminophen tablet 650 mg, 650 mg, Oral, Once PRN, Emerson Kaplan PA-C    EPINEPHrine (EPIPEN) 0.3 mg/0.3 mL pen injection 0.3 mg, 0.3 mg, Intramuscular, PRN, Emerson Kaplan PA-C    Review of patient's allergies indicates:   Allergen Reactions    Benadryl [diphenhydramine hcl]      cuase her bp to elevate    Prednisone Other (See Comments)     Reactions:  Increased blood pressure    Asa-calcium carb-mag-aluminum Other (See Comments)     Acid reflux  Acid reflux      Ibuprofen  Other (See Comments)     Hx of stroke per patient. Patient  states that she cannot take.    Penicillins     Powder base no.200 Rash     Powder in gloves causes rash on hands, Denies any reaction to latex products       Family History   Problem Relation Age of Onset    Cancer Mother     No Known Problems Father        Social History     Socioeconomic History    Marital status: Single   Tobacco Use    Smoking status: Never    Smokeless tobacco: Never   Substance and Sexual Activity    Alcohol use: Not Currently     Comment: quit 10/19    Drug use: No    Sexual activity: Not Currently     Partners: Male     Birth control/protection: None       COMPREHENSIVE GYN HISTORY:  PAP History:  Denies abnormal Paps except a noted above.  Infection History: Denies STDs. Denies PID.  Benign History: Denies uterine fibroids. Denies ovarian cysts. Denies endometriosis.  Denies other conditions.  Cancer History: Denies cervical cancer. Denies uterine cancer or hyperplasia. Denies ovarian cancer. Denies vulvar cancer or pre-cancer. Denies vaginal cancer or pre-cancer. Denies breast cancer. Denies colon cancer.    ROS:  GENERAL: No weight changes. No swelling. No fatigue. No fever.  CARDIOVASCULAR: No chest pain. No shortness of breath. No leg cramps.   NEUROLOGICAL: No headaches. No vision changes.  BREASTS: No pain. No lumps. No discharge.  ABDOMEN: No pain. No nausea. No vomiting. No diarrhea. No constipation.  REPRODUCTIVE: No abnormal bleeding.   VULVA: No pain. No lesions. No itching.  VAGINA: No relaxation. No itching. No odor. No discharge. No lesions.  URINARY: No incontinence. No nocturia. No frequency. No dysuria.    /82   Wt 72.4 kg (159 lb 11.6 oz)   BMI 30.18 kg/m²     PE:  APPEARANCE: Well nourished, well developed, in no acute distress.  AFFECT: WNL, alert and oriented x 3.  SKIN: No hirsutism or acne.  NECK: Neck symmetric without masses or thyromegaly.  NODES: No inguinal, cervical, axillary or femoral lymph  node enlargement.  CHEST: Good respiratory effort.   ABDOMEN: Soft. No tenderness or masses. No hepatosplenomegaly. No hernias.  BREASTS: Symmetrical, no skin changes or visible lesions. No palpable masses, nipple discharge bilaterally.  PELVIC: ATROPHIC EXTERNAL FEMALE GENITALIA without lesions. Normal hair distribution. Adequate perineal body, normal urethral meatus. VAGINA DRY without lesions or discharge. CERVIX STENOTIC without lesions, discharge or tenderness. No significant cystocele or rectocele. Bimanual exam shows uterus to be normal size, regular, mobile and nontender. Adnexa without masses or tenderness.  EXTREMITIES: No edema.    PROCEDURES:    DIAGNOSIS:  1. Women's annual routine gynecological examination  DXA Bone Density Spine And Hip      2. Encounter for Papanicolaou smear for cervical cancer screening  Liquid-Based Pap Smear, Screening      3. Screening for human papillomavirus (HPV)  HPV High Risk Genotypes, PCR      4. Screening breast examination        5. Postmenopausal  DXA Bone Density Spine And Hip      6. FH: ovarian cancer in first degree relative            PLAN:    LABS AND TESTS ORDERED:    PLAN:  - Pap and HPV done today.  - Screening tests as ordered.  - Diet and exercise encouraged.  Seat belt use encouraged.  Reviewed ASCCP Pap guidelines and screening recommendations.  Calcium and vitamin D recommended.     Counseling: Healthy life style choices including diet and exercise, 1200 mg calcium and 600 IU until age 71 then 800 IU vitamin D supplementation daily, healthy sexual decision making, development of healthy and safe relationships.  Patient was counseled today on postmenopausal issues.   The patient was counseled today on osteoporosis prevention, calcium supplementation, and regular weight bearing exercise. The patient was also counseled today on ACS PAP guidelines, with recommendations for yearly pelvic exams unless their uterus, cervix, and ovaries were removed for benign  reasons; in that case, examinations every 3-5 years are recommended.  The patient was also counseled regarding monthly breast self-examination, routine STD screening for at-risk populations, prophylactic immunizations for transmitted infections such as  HPV, Pertussis, or Influenza as appropriate, and yearly mammograms when indicated by ACS guidelines.  She was advised to see her primary care physician for all other health maintenance.    FOLLOW-UP with me annually.

## 2023-01-31 LAB
CLINICAL INFO: ABNORMAL
CYTO CVX: ABNORMAL
CYTOLOGIST CVX/VAG CYTO: ABNORMAL
CYTOLOGIST CVX/VAG CYTO: ABNORMAL
CYTOLOGY CMNT CVX/VAG CYTO-IMP: ABNORMAL
CYTOLOGY PAP THIN PREP EXPLANATION: ABNORMAL
DATE OF PREVIOUS PAP: ABNORMAL
DATE PREVIOUS BX: NO
GEN CATEG CVX/VAG CYTO-IMP: ABNORMAL
HPV I/H RISK 4 DNA CVX QL NAA+PROBE: NOT DETECTED
LMP START DATE: ABNORMAL
MICROORGANISM CVX/VAG CYTO: ABNORMAL
PATHOLOGIST CVX/VAG CYTO: ABNORMAL
SERVICE CMNT-IMP: ABNORMAL
SPECIMEN SOURCE CVX/VAG CYTO: ABNORMAL
STAT OF ADQ CVX/VAG CYTO-IMP: ABNORMAL

## 2023-02-23 RX ORDER — TRAZODONE HYDROCHLORIDE 50 MG/1
100 TABLET ORAL NIGHTLY
Qty: 60 TABLET | Refills: 11 | Status: CANCELLED | OUTPATIENT
Start: 2023-02-23 | End: 2024-02-23

## 2023-02-23 NOTE — TELEPHONE ENCOUNTER
----- Message from Darrell Guzmán sent at 2/23/2023 10:52 AM CST -----  Regarding: pt [phone]  Type: Patient Call Back    Who called:pt     What is the request in detail:medication concerns     Can the clinic reply by [facility]?no     Would the patient rather a call back or a response via [facility] back     Best call back number: [phone]      Additional Information:      Not sleeping well, can you give her something for anxiety. She wakes up around 4am and can't go back to sleep from the anxiety

## 2023-02-24 ENCOUNTER — TELEPHONE (OUTPATIENT)
Dept: FAMILY MEDICINE | Facility: CLINIC | Age: 68
End: 2023-02-24
Payer: MEDICARE

## 2023-02-24 RX ORDER — VENLAFAXINE HYDROCHLORIDE 37.5 MG/1
37.5 CAPSULE, EXTENDED RELEASE ORAL DAILY
Qty: 30 CAPSULE | Refills: 5 | Status: SHIPPED | OUTPATIENT
Start: 2023-02-24 | End: 2023-08-23

## 2023-02-24 NOTE — TELEPHONE ENCOUNTER
----- Message from Ana Hendrickson sent at 2/24/2023  1:32 PM CST -----      Can the clinic reply in [facility]:N        Please refill the medication(s) listed below. Please call the patient when the prescription(s) is ready for  at this phone number         Medication #1 venlafaxine (EFFEXOR-XR) 37.5 MG 24 hr capsule    Medication #2       Preferred Pharmacy: [address]   Phone:  [phone]  Fax:  [phone]

## 2023-02-24 NOTE — TELEPHONE ENCOUNTER
----- Message from Chris Campbell sent at 2/24/2023  2:21 PM CST -----  Regarding: Patient Call Back  .Type: Patient Call Back    Who called: Self    What is the request in detail: Pt is not feeling well and having serious sinus issues and would like an earlier appt than 03/20 to see .    Can the clinic reply by [facility]? No    Would the patient rather a call back or a response via [facility]? Call back    Best call back number: [phone]     Additional Information:

## 2023-02-24 NOTE — TELEPHONE ENCOUNTER
Pt informed no sooner appt available and she would need to be seen in urgent care; pt verbalized understanding

## 2023-04-04 ENCOUNTER — TELEPHONE (OUTPATIENT)
Dept: PSYCHIATRY | Facility: CLINIC | Age: 68
End: 2023-04-04
Payer: MEDICARE

## 2023-04-04 NOTE — TELEPHONE ENCOUNTER
Pt called to make NP appt. Made pt she will need ref from [facility] to further schedule for Psych. Made pt aware once she receives ref to pls give us a call back so we can get her booked. No concerns voiced.

## 2023-04-10 ENCOUNTER — HOSPITAL ENCOUNTER (EMERGENCY)
Facility: HOSPITAL | Age: 68
Discharge: HOME | End: 2023-04-10
Attending: EMERGENCY MEDICINE
Payer: MEDICARE

## 2023-04-10 VITALS
HEIGHT: 61 IN | WEIGHT: 160 LBS | TEMPERATURE: 99 F | HEART RATE: 79 BPM | BODY MASS INDEX: 30.21 KG/M2 | SYSTOLIC BLOOD PRESSURE: 122 MMHG | RESPIRATION RATE: 18 BRPM | OXYGEN SATURATION: 96 % | DIASTOLIC BLOOD PRESSURE: 75 MMHG

## 2023-04-10 DIAGNOSIS — R20.0 NUMBNESS: Primary | ICD-10-CM

## 2023-04-10 DIAGNOSIS — R42 LIGHTHEADEDNESS: ICD-10-CM

## 2023-04-10 LAB
ALBUMIN SERPL BCP-MCNC: 4.7 G/DL (ref 3.5–5.2)
ALP SERPL-CCNC: 66 U/L (ref 55–135)
ALT SERPL W/O P-5'-P-CCNC: 11 U/L (ref 10–44)
ANION GAP SERPL CALC-SCNC: 9 MMOL/L (ref 8–16)
AST SERPL-CCNC: 18 U/L (ref 10–40)
BASOPHILS # BLD AUTO: 0.02 K/UL (ref 0–0.2)
BASOPHILS NFR BLD: 0.3 % (ref 0–1.9)
BILIRUB SERPL-MCNC: 1.8 MG/DL (ref 0.1–1)
BILIRUB UR QL STRIP: NEGATIVE
BUN SERPL-MCNC: 6 MG/DL (ref 8–23)
CALCIUM SERPL-MCNC: 11 MG/DL (ref 8.7–10.5)
CHLORIDE SERPL-SCNC: 109 MMOL/L (ref 95–110)
CLARITY UR: CLEAR
CO2 SERPL-SCNC: 23 MMOL/L (ref 23–29)
COLOR UR: COLORLESS
CREAT SERPL-MCNC: 0.8 MG/DL (ref 0.5–1.4)
DIFFERENTIAL METHOD: ABNORMAL
EOSINOPHIL # BLD AUTO: 0 K/UL (ref 0–0.5)
EOSINOPHIL NFR BLD: 0.3 % (ref 0–8)
ERYTHROCYTE [DISTWIDTH] IN BLOOD BY AUTOMATED COUNT: 14 % (ref 11.5–14.5)
EST. GFR  (NO RACE VARIABLE): >60 ML/MIN/1.73 M^2
GLUCOSE SERPL-MCNC: 100 MG/DL (ref 70–110)
GLUCOSE UR QL STRIP: NEGATIVE
HCT VFR BLD AUTO: 45 % (ref 37–48.5)
HGB BLD-MCNC: 14.5 G/DL (ref 12–16)
HGB UR QL STRIP: NEGATIVE
IMM GRANULOCYTES # BLD AUTO: 0.01 K/UL (ref 0–0.04)
IMM GRANULOCYTES NFR BLD AUTO: 0.2 % (ref 0–0.5)
KETONES UR QL STRIP: NEGATIVE
LEUKOCYTE ESTERASE UR QL STRIP: NEGATIVE
LYMPHOCYTES # BLD AUTO: 2.1 K/UL (ref 1–4.8)
LYMPHOCYTES NFR BLD: 31.7 % (ref 18–48)
MCH RBC QN AUTO: 27.1 PG (ref 27–31)
MCHC RBC AUTO-ENTMCNC: 32.2 G/DL (ref 32–36)
MCV RBC AUTO: 84 FL (ref 82–98)
MONOCYTES # BLD AUTO: 0.4 K/UL (ref 0.3–1)
MONOCYTES NFR BLD: 6.3 % (ref 4–15)
NEUTROPHILS # BLD AUTO: 4 K/UL (ref 1.8–7.7)
NEUTROPHILS NFR BLD: 61.2 % (ref 38–73)
NITRITE UR QL STRIP: NEGATIVE
NRBC BLD-RTO: 0 /100 WBC
PH UR STRIP: 7 [PH] (ref 5–8)
PLATELET # BLD AUTO: ABNORMAL K/UL (ref 150–450)
PLATELET BLD QL SMEAR: ABNORMAL
PMV BLD AUTO: ABNORMAL FL (ref 9.2–12.9)
POCT GLUCOSE: 93 MG/DL (ref 70–110)
POTASSIUM SERPL-SCNC: 3.5 MMOL/L (ref 3.5–5.1)
PROT SERPL-MCNC: 8.3 G/DL (ref 6–8.4)
PROT UR QL STRIP: NEGATIVE
RBC # BLD AUTO: 5.35 M/UL (ref 4–5.4)
SODIUM SERPL-SCNC: 141 MMOL/L (ref 136–145)
SP GR UR STRIP: 1 (ref 1–1.03)
URN SPEC COLLECT METH UR: ABNORMAL
UROBILINOGEN UR STRIP-ACNC: NEGATIVE EU/DL
WBC # BLD AUTO: 6.49 K/UL (ref 3.9–12.7)

## 2023-04-10 PROCEDURE — 25000003 PHARM REV CODE 250

## 2023-04-10 PROCEDURE — 82962 GLUCOSE BLOOD TEST: CPT

## 2023-04-10 PROCEDURE — 96375 TX/PRO/DX INJ NEW DRUG ADDON: CPT

## 2023-04-10 PROCEDURE — 80053 COMPREHEN METABOLIC PANEL: CPT

## 2023-04-10 PROCEDURE — 93010 ELECTROCARDIOGRAM REPORT: CPT | Mod: ,,, | Performed by: INTERNAL MEDICINE

## 2023-04-10 PROCEDURE — 63600175 PHARM REV CODE 636 W HCPCS

## 2023-04-10 PROCEDURE — 96374 THER/PROPH/DIAG INJ IV PUSH: CPT

## 2023-04-10 PROCEDURE — 93010 EKG 12-LEAD: ICD-10-PCS | Mod: ,,, | Performed by: INTERNAL MEDICINE

## 2023-04-10 PROCEDURE — 99285 EMERGENCY DEPT VISIT HI MDM: CPT | Mod: 25

## 2023-04-10 PROCEDURE — 93005 ELECTROCARDIOGRAM TRACING: CPT

## 2023-04-10 PROCEDURE — 81003 URINALYSIS AUTO W/O SCOPE: CPT

## 2023-04-10 PROCEDURE — 85025 COMPLETE CBC W/AUTO DIFF WBC: CPT

## 2023-04-10 RX ORDER — LIDOCAINE 50 MG/G
1 PATCH TOPICAL DAILY
Qty: 15 PATCH | Refills: 0 | Status: SHIPPED | OUTPATIENT
Start: 2023-04-10

## 2023-04-10 RX ORDER — ONDANSETRON 2 MG/ML
4 INJECTION INTRAMUSCULAR; INTRAVENOUS
Status: COMPLETED | OUTPATIENT
Start: 2023-04-10 | End: 2023-04-10

## 2023-04-10 RX ORDER — MORPHINE SULFATE 4 MG/ML
4 INJECTION, SOLUTION INTRAMUSCULAR; INTRAVENOUS
Status: COMPLETED | OUTPATIENT
Start: 2023-04-10 | End: 2023-04-10

## 2023-04-10 RX ORDER — ACETAMINOPHEN 500 MG
1000 TABLET ORAL
Status: COMPLETED | OUTPATIENT
Start: 2023-04-10 | End: 2023-04-10

## 2023-04-10 RX ADMIN — ONDANSETRON 4 MG: 2 INJECTION INTRAMUSCULAR; INTRAVENOUS at 11:04

## 2023-04-10 RX ADMIN — MORPHINE SULFATE 4 MG: 4 INJECTION INTRAVENOUS at 02:04

## 2023-04-10 RX ADMIN — ACETAMINOPHEN 1000 MG: 500 TABLET ORAL at 12:04

## 2023-04-10 NOTE — ED NOTES
"Pt. Reports numbness to Left extremities since Friday. Pt has Hx of stroke and has residual left sided weakness but per pt, has never felt numbness before. Pt states feeling dizzy "like I drank wine but I didn't." Pt denies falls, LOC, weakness, slurred speech. Pt denies any other pain/discomfort at this moment. Neuro assessment performed, no neuro deficits   "

## 2023-04-10 NOTE — ED PROVIDER NOTES
"Encounter Date: 4/10/2023       History     Chief Complaint   Patient presents with    Numbness     Pt reports left sided numbness starting yesterday. Pt reports a stroke 2 years ago also affecting the left side.      67-year-old female with history of stroke, HLD, HTN who presents to the ED for chief complaint of new left-sided numbness of 4 days.  Patient states that she had left upper extremity numbness on Friday which has worsened over the past few days and she now endorses left lower extremity with numbness and tingling.  She states that her left side feels "like it is in pain."  Patient tried taking her Lyrica medication but states it provides little relief.  She also endorses lightheadedness, blurry vision, nausea, anxiety, and intermittent SOB.  She denies any chest pain, emesis, leg swelling, and headache.  Patient had a CVA about 2 years ago and states that she had left-sided weakness.  She does not use any assistance at home for ambulation.    The history is provided by the patient. No  was used.   Review of patient's allergies indicates:   Allergen Reactions    Benadryl [diphenhydramine hcl]      cuase her bp to elevate    Prednisone Other (See Comments)     Reactions:  Increased blood pressure    Asa-calcium carb-mag-aluminum Other (See Comments)     Acid reflux  Acid reflux      Aspirin Other (See Comments)    Ibuprofen Other (See Comments)     Hx of stroke per patient. Patient  states that she cannot take.    Penicillins     Powder base no.200 Rash     Powder in gloves causes rash on hands, Denies any reaction to latex products     Past Medical History:   Diagnosis Date    Chronic pain     left arm    Hyperlipidemia     Hypertension     Stroke     2019     Past Surgical History:   Procedure Laterality Date    BUNIONECTOMY      LEG TENDON SURGERY Right      Family History   Problem Relation Age of Onset    Cancer Mother     No Known Problems Father     Ovarian cancer Sister  "     Social History     Tobacco Use    Smoking status: Never    Smokeless tobacco: Never   Substance Use Topics    Alcohol use: Not Currently     Comment: quit 10/19    Drug use: No     Review of Systems   Constitutional:  Negative for chills and fever.   Eyes:  Positive for visual disturbance.   Respiratory:  Positive for shortness of breath.    Cardiovascular:  Negative for chest pain and leg swelling.   Gastrointestinal:  Positive for nausea. Negative for abdominal pain and vomiting.   Genitourinary:  Negative for difficulty urinating.   Neurological:  Positive for light-headedness and numbness. Negative for headaches.   Psychiatric/Behavioral:  The patient is nervous/anxious.    All other systems reviewed and are negative.    Physical Exam     Initial Vitals [04/10/23 1045]   BP Pulse Resp Temp SpO2   (!) 146/91 (!) 114 18 99 °F (37.2 °C) 98 %      MAP       --         Physical Exam    Nursing note and vitals reviewed.  Constitutional: No distress.   Patient is laying in bed in no apparent distress, but she is anxious.   HENT:   Head: Normocephalic.   Eyes: Conjunctivae are normal. No scleral icterus.   Neck:   Normal range of motion.  Cardiovascular:  Regular rhythm.           Tachycardic   Pulmonary/Chest: Breath sounds normal. No respiratory distress. She has no wheezes. She has no rhonchi. She has no rales.   Abdominal: Abdomen is soft. She exhibits no distension. There is no abdominal tenderness. There is no rebound and no guarding.   Musculoskeletal:         General: No tenderness or edema. Normal range of motion.      Cervical back: Normal range of motion.     Neurological: She is alert.   5/5 upper and lower motor extremity strength. Altered sensation to touch on left upper and lower extremity.   Skin: Skin is warm. Capillary refill takes less than 2 seconds.   Psychiatric: She has a normal mood and affect.       ED Course   Procedures  Labs Reviewed   CBC W/ AUTO DIFFERENTIAL   COMPREHENSIVE METABOLIC  PANEL   URINALYSIS, REFLEX TO URINE CULTURE   POCT GLUCOSE          Imaging Results    None          Medications   ondansetron injection 4 mg (has no administration in time range)     Medical Decision Making:   Initial Assessment:   Emergent evaluation of 67-year-old female with history of stroke, HLD, HTN who presents to the ED for chief complaint of new left-sided numbness of 4 days.  She is in no apparent distress.  She is hemodynamically stable.  Differential Diagnosis:   - Post-stroke pain  - ICH  - Stroke  - TIA  - Hypoglycemia  Clinical Tests:   Lab Tests: Ordered and Reviewed  Radiological Study: Ordered and Reviewed  Medical Tests: Ordered and Reviewed  ED Management:  Patient presents with numbness.  Obtained history and physical exam.  Administered Zofran for nausea management and Tylenol for pain management.  Patient's glucose and UA are WNL.  Patient reassessed and she states she is in severe pain due to her left lower extremity.  Administered morphine for pain management.  Patient reassessed and she states that her pain has improved.  Please refer to ED Course for additional details.    Discussed and provided prescription for lidocaine patch for pain management.  Discussed referral and follow up with neurology clinic and with primary care provider.  Provided precautions for when to return to the emergency department.  Discharged patient to home.            ED Course as of 04/10/23 1902   Mon Apr 10, 2023   1120 Independent interpretation of EKG shows sinus tachycardia, rate of 104 bpm, and no ST elevations. [MD]   1247 CT head shows no acute intracranial abnormalities. [MD]   1248 CXR shows no cardiopulmonary abnormalities. [MD]      ED Course User Index  [MD] Derick Fisher MD                 Clinical Impression:   Final diagnoses:  [R42] Lightheadedness               Derick Fisher MD  Resident  04/10/23 1919

## 2023-04-10 NOTE — DISCHARGE INSTRUCTIONS
Diagnosis:   1. Numbness    2. Lightheadedness      Home Care Instructions:  - You can apply 1 Lidocaine patch to area of pain daily.    Follow-Up Plan:  - A referral has been made with neurology and you will receive a call to schedule an appointment.  You may also call neurology clinic to schedule an appointment.  - Follow-up with: Primary care provider within 1 week    Return to the Emergency Department for symptoms including but not limited to: worsening symptoms or other concerning symptoms.

## 2023-06-09 ENCOUNTER — OFFICE VISIT (OUTPATIENT)
Dept: NEUROLOGY | Facility: CLINIC | Age: 68
End: 2023-06-09
Payer: MEDICARE

## 2023-06-09 VITALS
BODY MASS INDEX: 28.93 KG/M2 | HEIGHT: 61 IN | DIASTOLIC BLOOD PRESSURE: 80 MMHG | SYSTOLIC BLOOD PRESSURE: 136 MMHG | WEIGHT: 153.25 LBS | HEART RATE: 94 BPM

## 2023-06-09 DIAGNOSIS — G56.02 CARPAL TUNNEL SYNDROME OF LEFT WRIST: Primary | ICD-10-CM

## 2023-06-09 DIAGNOSIS — Z86.73 HISTORY OF STROKE: ICD-10-CM

## 2023-06-09 DIAGNOSIS — M54.12 CERVICAL RADICULOPATHY: ICD-10-CM

## 2023-06-09 PROCEDURE — 1159F PR MEDICATION LIST DOCUMENTED IN MEDICAL RECORD: ICD-10-PCS | Mod: CPTII,S$GLB,, | Performed by: STUDENT IN AN ORGANIZED HEALTH CARE EDUCATION/TRAINING PROGRAM

## 2023-06-09 PROCEDURE — 99215 PR OFFICE/OUTPT VISIT, EST, LEVL V, 40-54 MIN: ICD-10-PCS | Mod: S$GLB,,, | Performed by: STUDENT IN AN ORGANIZED HEALTH CARE EDUCATION/TRAINING PROGRAM

## 2023-06-09 PROCEDURE — 3079F DIAST BP 80-89 MM HG: CPT | Mod: CPTII,S$GLB,, | Performed by: STUDENT IN AN ORGANIZED HEALTH CARE EDUCATION/TRAINING PROGRAM

## 2023-06-09 PROCEDURE — 3075F PR MOST RECENT SYSTOLIC BLOOD PRESS GE 130-139MM HG: ICD-10-PCS | Mod: CPTII,S$GLB,, | Performed by: STUDENT IN AN ORGANIZED HEALTH CARE EDUCATION/TRAINING PROGRAM

## 2023-06-09 PROCEDURE — 3008F PR BODY MASS INDEX (BMI) DOCUMENTED: ICD-10-PCS | Mod: CPTII,S$GLB,, | Performed by: STUDENT IN AN ORGANIZED HEALTH CARE EDUCATION/TRAINING PROGRAM

## 2023-06-09 PROCEDURE — 99999 PR PBB SHADOW E&M-EST. PATIENT-LVL IV: ICD-10-PCS | Mod: PBBFAC,,, | Performed by: STUDENT IN AN ORGANIZED HEALTH CARE EDUCATION/TRAINING PROGRAM

## 2023-06-09 PROCEDURE — 99215 OFFICE O/P EST HI 40 MIN: CPT | Mod: S$GLB,,, | Performed by: STUDENT IN AN ORGANIZED HEALTH CARE EDUCATION/TRAINING PROGRAM

## 2023-06-09 PROCEDURE — 3288F FALL RISK ASSESSMENT DOCD: CPT | Mod: CPTII,S$GLB,, | Performed by: STUDENT IN AN ORGANIZED HEALTH CARE EDUCATION/TRAINING PROGRAM

## 2023-06-09 PROCEDURE — 1159F MED LIST DOCD IN RCRD: CPT | Mod: CPTII,S$GLB,, | Performed by: STUDENT IN AN ORGANIZED HEALTH CARE EDUCATION/TRAINING PROGRAM

## 2023-06-09 PROCEDURE — 1126F PR PAIN SEVERITY QUANTIFIED, NO PAIN PRESENT: ICD-10-PCS | Mod: CPTII,S$GLB,, | Performed by: STUDENT IN AN ORGANIZED HEALTH CARE EDUCATION/TRAINING PROGRAM

## 2023-06-09 PROCEDURE — 3008F BODY MASS INDEX DOCD: CPT | Mod: CPTII,S$GLB,, | Performed by: STUDENT IN AN ORGANIZED HEALTH CARE EDUCATION/TRAINING PROGRAM

## 2023-06-09 PROCEDURE — 3079F PR MOST RECENT DIASTOLIC BLOOD PRESSURE 80-89 MM HG: ICD-10-PCS | Mod: CPTII,S$GLB,, | Performed by: STUDENT IN AN ORGANIZED HEALTH CARE EDUCATION/TRAINING PROGRAM

## 2023-06-09 PROCEDURE — 1126F AMNT PAIN NOTED NONE PRSNT: CPT | Mod: CPTII,S$GLB,, | Performed by: STUDENT IN AN ORGANIZED HEALTH CARE EDUCATION/TRAINING PROGRAM

## 2023-06-09 PROCEDURE — 3288F PR FALLS RISK ASSESSMENT DOCUMENTED: ICD-10-PCS | Mod: CPTII,S$GLB,, | Performed by: STUDENT IN AN ORGANIZED HEALTH CARE EDUCATION/TRAINING PROGRAM

## 2023-06-09 PROCEDURE — 1101F PR PT FALLS ASSESS DOC 0-1 FALLS W/OUT INJ PAST YR: ICD-10-PCS | Mod: CPTII,S$GLB,, | Performed by: STUDENT IN AN ORGANIZED HEALTH CARE EDUCATION/TRAINING PROGRAM

## 2023-06-09 PROCEDURE — 3075F SYST BP GE 130 - 139MM HG: CPT | Mod: CPTII,S$GLB,, | Performed by: STUDENT IN AN ORGANIZED HEALTH CARE EDUCATION/TRAINING PROGRAM

## 2023-06-09 PROCEDURE — 1101F PT FALLS ASSESS-DOCD LE1/YR: CPT | Mod: CPTII,S$GLB,, | Performed by: STUDENT IN AN ORGANIZED HEALTH CARE EDUCATION/TRAINING PROGRAM

## 2023-06-09 PROCEDURE — 99999 PR PBB SHADOW E&M-EST. PATIENT-LVL IV: CPT | Mod: PBBFAC,,, | Performed by: STUDENT IN AN ORGANIZED HEALTH CARE EDUCATION/TRAINING PROGRAM

## 2023-06-09 RX ORDER — PREGABALIN 100 MG/1
100 CAPSULE ORAL 3 TIMES DAILY
Qty: 90 CAPSULE | Refills: 5 | Status: SHIPPED | OUTPATIENT
Start: 2023-06-09 | End: 2023-12-06

## 2023-06-09 RX ORDER — GABAPENTIN 300 MG/1
300 CAPSULE ORAL 3 TIMES DAILY
COMMUNITY
Start: 2023-05-16 | End: 2023-06-09

## 2023-06-09 NOTE — PROGRESS NOTES
Chief Complaint and Duration     Numbness in left hand    History of Present Illness     Grazyna Durand is a 67 y.o. female w hx of cervical spondylosis and radiculopathy. On lyrica. With hx of getting a pin in her foot associated with a bunion. Insomnia in which she was getting trazodone.    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 Patient is right handed. Drops objects. Does not radiate from her neck. No new changes to bowel or bladder.     Was referred to pain management in past but patient did not establish care.       Review of patient's allergies indicates:   Allergen Reactions    Benadryl [diphenhydramine hcl]      cuase her bp to elevate    Prednisone Other (See Comments)     Reactions:  Increased blood pressure    Asa-calcium carb-mag-aluminum Other (See Comments)     Acid reflux  Acid reflux      Aspirin Other (See Comments)    Ibuprofen Other (See Comments)     Hx of stroke per patient. Patient  states that she cannot take.    Penicillins     Powder base no.200 Rash     Powder in gloves causes rash on hands, Denies any reaction to latex products     Current Outpatient Medications   Medication Sig Dispense Refill    amLODIPine (NORVASC) 10 MG tablet Take 10 mg by mouth once daily.      atorvastatin (LIPITOR) 40 MG tablet Take 40 mg by mouth once daily.      benzonatate (TESSALON) 100 MG capsule Take 100 mg by mouth.      cetirizine (ZYRTEC) 10 MG tablet Take 10 mg by mouth once daily.      diclofenac sodium (VOLTAREN) 1 % Gel SMARTSI Gram(s) Topical 5 Times Daily PRN      esomeprazole (NEXIUM) 40 MG capsule Take 40 mg by mouth before breakfast.      fluticasone propionate (FLONASE) 50 mcg/actuation nasal spray 1 spray by Each Nostril route once daily.      LIDOcaine (LIDODERM) 5 % Place 1 patch onto the skin once daily. Remove & Discard patch within 12 hours or as directed by MD 15 patch 0    loratadine (CLARITIN) 10 mg tablet Take 10 mg by mouth once daily.      ondansetron (ZOFRAN) 4 MG tablet  Take 1 tablet (4 mg total) by mouth every 6 (six) hours. 12 tablet 0    ondansetron (ZOFRAN-ODT) 4 MG TbDL Take 4 mg by mouth 3 (three) times daily.      pantoprazole (PROTONIX) 40 MG tablet Please take 1 tablet by mouth every 12 hours while you have abdominal discomfort, then take 1 tablet every day. 30 tablet 0    predniSONE (DELTASONE) 20 MG tablet Take 40 mg by mouth.      tiZANidine (ZANAFLEX) 4 MG tablet Take 4 mg by mouth every 6 (six) hours as needed.      traMADoL (ULTRAM) 50 mg tablet Take 50 mg by mouth every 8 (eight) hours as needed.      traZODone (DESYREL) 100 MG tablet Take 100 mg by mouth every evening.      venlafaxine (EFFEXOR-XR) 37.5 MG 24 hr capsule Take 1 capsule (37.5 mg total) by mouth once daily. 30 capsule 5    LORazepam (ATIVAN) 0.5 MG tablet Take 2 tablets (1 mg total) by mouth every 6 (six) hours as needed for Anxiety. 4 tablet 0    pregabalin (LYRICA) 100 MG capsule Take 1 capsule (100 mg total) by mouth 3 (three) times daily. 90 capsule 5     Current Facility-Administered Medications   Medication Dose Route Frequency Provider Last Rate Last Admin    acetaminophen tablet 650 mg  650 mg Oral Once PRN Emerson Kaplan PA-C        EPINEPHrine (EPIPEN) 0.3 mg/0.3 mL pen injection 0.3 mg  0.3 mg Intramuscular PRN Emerson Kaplan PA-C           Medical History     Past Medical History:   Diagnosis Date    Chronic pain     left arm    Hyperlipidemia     Hypertension     Stroke     2019     Past Surgical History:   Procedure Laterality Date    BUNIONECTOMY      LEG TENDON SURGERY Right      Family History   Problem Relation Age of Onset    Cancer Mother     No Known Problems Father     Ovarian cancer Sister      Social History     Socioeconomic History    Marital status: Single   Tobacco Use    Smoking status: Never    Smokeless tobacco: Never   Substance and Sexual Activity    Alcohol use: Not Currently     Comment: quit 10/19    Drug use: No    Sexual activity: Not Currently     Partners:  Male     Birth control/protection: None       Exam     Vitals:    06/09/23 0825   BP: 136/80   Pulse: 94      Physical Exam:  General: Not in acute distress. Not ill-appearing.   HENT: Normocephalic and atraumatic. Moist mucous membranes.  Eyes: Conjunctivae normal.   Pulmonary: Pulmonary effort is normal.   Abdominal: Abdomen is soft and flat.   Skin: Skin is warm and dry. No rashes.   Psychiatric: Mood normal.        Neurologic Exam   Mental status: oriented to person, place, and time  Attention: Normal. Concentration: normal.  Speech: speech is normal.  Cranial Nerves: PERRL, EOMI intact, V1-V3 Facial sensation intact. Symmetric facies. Hearing grossly intact. Palate and uvula midline, symmetric. No tongue deviation. Trapezius strength intact.     Motor exam: bulk and tone normal. Strength 5/5 in bilateral upper extremities: deltoids, biceps, triceps. 4/5 strength in L ABP muscle, 5/5 on the R  Strength 5/5 in bilateral lower extremities: hip flexion/extension, thigh adduction/abduction, knee flexion/extension    Reflexes: 2+ in bilateral upper extremities: biceps and brachiaradialis, 2+ in bilateral lower extremities: patellar  De Leon's: negative    Sensory exam: light touch intact    Gait exam: normal  Coordination: normal    Tremor: none    Labs and Imaging     Labs: reviewed  A1C in 2016 was 5.4, TSH normal in 2021    Imaging: personally reviewed  4/2023 CTH no acute intracranial abnormalities  5/2022 MRI cervical spine -  multilevel cervical spondylosis    03/2021 MRI brain - encephalomalacia in subcortical infarct area and remote infarct lacunar areas      Assessment and Plan     Problem List Items Addressed This Visit          Neuro    History of stroke    Overview     October 2019. Discussed 2/2 stroke prevention including vascular control         Carpal tunnel syndrome of left wrist - Primary    Relevant Medications    pregabalin (LYRICA) 100 MG capsule    Other Relevant Orders    EMG W/ ULTRASOUND AND  NERVE CONDUCTION TEST 2 Extremities    Cervical radiculopathy    Relevant Medications    pregabalin (LYRICA) 100 MG capsule     Patient is new to me.    Patient w hx of stroke in 2019. No LSW noted on exam today, states she had LSW and numbness. Patient also hx of multilevel cervical spondylosis. Denies significant radicular symptoms today. States numbness in L hand and wrist radiating down fingertips. Has atrophy of L APB muscle with 4/5 weakness. Will get NCS/EMG to evaluate for L carpal tunnel. May need referral to hand surgeon for L carpal tunnel release surgery.     Okay to continue patient on lyrica 300mg daily (100mg x 3) for neuropathic pain associated w cervical spondylosis. Was getting trazodone for sleep by prior, will discontinue. Also not going to give muscle relaxant.     Follow-up: on NCS/EMG    Time spent on this encounter: 40 minutes. This includes face to face time and non-face to face time preparing to see the patient (eg, review of tests), obtaining and/or reviewing separately obtained history, documenting clinical information in the electronic or other health record, independently interpreting results and communicating results to the patient/family/caregiver, or care coordinator.

## 2023-10-18 ENCOUNTER — HOSPITAL ENCOUNTER (EMERGENCY)
Facility: HOSPITAL | Age: 68
Discharge: HOME | End: 2023-10-18
Attending: EMERGENCY MEDICINE
Payer: MEDICARE

## 2023-10-18 VITALS
OXYGEN SATURATION: 99 % | WEIGHT: 159 LBS | SYSTOLIC BLOOD PRESSURE: 162 MMHG | RESPIRATION RATE: 20 BRPM | HEART RATE: 91 BPM | TEMPERATURE: 98 F | DIASTOLIC BLOOD PRESSURE: 89 MMHG | BODY MASS INDEX: 30.04 KG/M2
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
## 2023-10-18 DIAGNOSIS — S50.362A INSECT BITE OF LEFT ELBOW, INITIAL ENCOUNTER: Primary | ICD-10-CM

## 2023-10-18 PROCEDURE — 99284 EMERGENCY DEPT VISIT MOD MDM: CPT

## 2023-10-18 RX ORDER — HYDROXYZINE PAMOATE 25 MG/1
25 CAPSULE ORAL EVERY 8 HOURS PRN
Qty: 30 CAPSULE | Refills: 0 | Status: SHIPPED | OUTPATIENT
Start: 2023-10-18

## 2023-10-18 RX ORDER — MUPIROCIN 20 MG/G
OINTMENT TOPICAL 3 TIMES DAILY
Qty: 30 G | Refills: 0 | Status: SHIPPED | OUTPATIENT
Start: 2023-10-18 | End: 2023-10-25

## 2023-10-18 NOTE — ED PROVIDER NOTES
"Encounter Date: 10/18/2023    SCRIBE #1 NOTE: I, Almita Yimisty Zhu, am scribing for, and in the presence of,  Juliann Paz PA. I have scribed the following portions of the note - Other sections scribed: HPI, ROS.       History     Chief Complaint   Patient presents with    Insect Bite     Pt reports having an insect bite on left forearm x 4 days ago.  Sit is red, warm  and  hard to touch. Pt washed with peroxide, abx ointment and taking zyrtec for itching.  Pt states fingers are feeling numb.  Limb is +pulses, +movement, +sensation.       A 68 y. o. female with a PMHx of HLD, HTN, stroke, who presents to the ED for a chief complaint of itch and "burning" sensation to an insect bite on the posterior side of her left forearm, which's near to the left elbow onset 4 days ago. Patient washed the bite with peroxide, and attempted Tx with antibiotic ointment and zyrtec for itching without relief. Endorses swelling to the area, and numbness to the left fingers. No other alleviating or exacerbating factors. Denies Hx of DM. She is allergic to Benadryl, Prednisone, Asa-calcium carb-mag-aluminum, Aspirin, Ibuprofen, Penicillins, and Powder base no.200.        The history is provided by the patient. No  was used.     Review of patient's allergies indicates:   Allergen Reactions    Benadryl [diphenhydramine hcl]      cuase her bp to elevate    Prednisone Other (See Comments)     Reactions:  Increased blood pressure    Asa-calcium carb-mag-aluminum Other (See Comments)     Acid reflux  Acid reflux      Aspirin Other (See Comments)    Ibuprofen Other (See Comments)     Hx of stroke per patient. Patient  states that she cannot take.    Penicillins     Powder base no.200 Rash     Powder in gloves causes rash on hands, Denies any reaction to latex products     Past Medical History:   Diagnosis Date    Chronic pain     left arm    Hyperlipidemia     Hypertension     Stroke     2019     Past Surgical History: "   Procedure Laterality Date    BUNIONECTOMY      LEG TENDON SURGERY Right      Family History   Problem Relation Age of Onset    Cancer Mother     No Known Problems Father     Ovarian cancer Sister      Social History     Tobacco Use    Smoking status: Never    Smokeless tobacco: Never   Substance Use Topics    Alcohol use: Not Currently     Comment: quit 10/19    Drug use: No     Review of Systems   Skin:         (+) swelling and itching to the insect bite on the posterior side of her L forearm, near the L elbow.   Neurological:  Positive for numbness (L fingers).       Physical Exam     Initial Vitals [10/18/23 1752]   BP Pulse Resp Temp SpO2   (!) 162/89 91 20 98.4 °F (36.9 °C) 99 %      MAP       --         Physical Exam    Nursing note and vitals reviewed.  Constitutional: She appears well-developed and well-nourished.   HENT:   Head: Normocephalic and atraumatic.   Right Ear: External ear normal.   Left Ear: External ear normal.   Eyes: Conjunctivae and EOM are normal. Pupils are equal, round, and reactive to light. No scleral icterus.   Neck: Neck supple. No JVD present.   Normal range of motion.  Cardiovascular:  Normal rate, regular rhythm and normal heart sounds.           Pulmonary/Chest: Breath sounds normal. No stridor. No respiratory distress. She exhibits no tenderness.   Abdominal: Abdomen is soft. Bowel sounds are normal. She exhibits no mass. There is no abdominal tenderness. There is no rebound and no guarding.   Musculoskeletal:         General: No tenderness or edema. Normal range of motion.      Cervical back: Normal range of motion and neck supple.      Comments: Back is nontender to palpation.      Neurological: She is alert and oriented to person, place, and time. She has normal strength. No cranial nerve deficit.   Skin: Skin is warm and dry. Capillary refill takes less than 2 seconds. No rash noted. No pallor.   Erythema and excoriations to L elbow.    Psychiatric: She has a normal mood  and affect. Thought content normal.         ED Course   Procedures  Labs Reviewed - No data to display       Imaging Results    None          Medications - No data to display  Medical Decision Making  Patient has an insect bite to left elbow with excoriations.  There is mild tenderness with palpation.  Will discharge patient home with Bactroban ointment and hydroxyzine for itching.  No signs or symptoms of anaphylaxis.  Patient is stable for discharge.    Risk  Prescription drug management.            Scribe Attestation:   Scribe #1: I performed the above scribed service and the documentation accurately describes the services I performed. I attest to the accuracy of the note.                      Scribe attestation: I, Juliann Paz PA-C , personally performed the services described in this documentation. All medical record entries made by the scribe were at my direction and in my presence.  I have reviewed the chart and agree that the record reflects my personal performance and is accurate and complete.    Clinical Impression:   Final diagnoses:  [S50.362A, W57.XXXA] Insect bite of left elbow, initial encounter (Primary)        ED Disposition Condition    Discharge Stable          ED Prescriptions       Medication Sig Dispense Start Date End Date Auth. Provider    mupirocin (BACTROBAN) 2 % ointment Apply topically 3 (three) times daily. for 7 days 30 g 10/18/2023 10/25/2023 Juliann Paz PA    hydrOXYzine pamoate (VISTARIL) 25 MG Cap Take 1 capsule (25 mg total) by mouth every 8 (eight) hours as needed. 30 capsule 10/18/2023 -- Juliann Paz PA          Follow-up Information       Follow up With Specialties Details Why Contact Info    Yvon Good MD Internal Medicine   [address] [facility] [address]  [phone]               Juliann Paz PA  10/18/23 4802

## 2024-03-23 ENCOUNTER — HOSPITAL ENCOUNTER (EMERGENCY)
Facility: HOSPITAL | Age: 69
Discharge: HOME | End: 2024-03-23
Attending: EMERGENCY MEDICINE
Payer: MEDICARE

## 2024-03-23 VITALS
WEIGHT: 141 LBS | DIASTOLIC BLOOD PRESSURE: 74 MMHG | TEMPERATURE: 99 F | RESPIRATION RATE: 20 BRPM | SYSTOLIC BLOOD PRESSURE: 156 MMHG | HEART RATE: 62 BPM | BODY MASS INDEX: 26.62 KG/M2 | OXYGEN SATURATION: 99 % | HEIGHT: 61 IN

## 2024-03-23 DIAGNOSIS — F41.9 ANXIETY: ICD-10-CM

## 2024-03-23 LAB
ALBUMIN SERPL BCP-MCNC: 4.1 G/DL (ref 3.5–5.2)
ALP SERPL-CCNC: 58 U/L (ref 55–135)
ALT SERPL W/O P-5'-P-CCNC: 5 U/L (ref 10–44)
ANION GAP SERPL CALC-SCNC: 10 MMOL/L (ref 8–16)
AST SERPL-CCNC: 15 U/L (ref 10–40)
BASOPHILS # BLD AUTO: 0.01 K/UL (ref 0–0.2)
BASOPHILS NFR BLD: 0.2 % (ref 0–1.9)
BILIRUB SERPL-MCNC: 1.7 MG/DL (ref 0.1–1)
BNP SERPL-MCNC: 16 PG/ML (ref 0–99)
BUN SERPL-MCNC: 5 MG/DL (ref 8–23)
CALCIUM SERPL-MCNC: 9.9 MG/DL (ref 8.7–10.5)
CHLORIDE SERPL-SCNC: 106 MMOL/L (ref 95–110)
CO2 SERPL-SCNC: 21 MMOL/L (ref 23–29)
CREAT SERPL-MCNC: 0.8 MG/DL (ref 0.5–1.4)
DIFFERENTIAL METHOD BLD: ABNORMAL
EOSINOPHIL # BLD AUTO: 0 K/UL (ref 0–0.5)
EOSINOPHIL NFR BLD: 0.6 % (ref 0–8)
ERYTHROCYTE [DISTWIDTH] IN BLOOD BY AUTOMATED COUNT: 14.1 % (ref 11.5–14.5)
EST. GFR  (NO RACE VARIABLE): >60 ML/MIN/1.73 M^2
GLUCOSE SERPL-MCNC: 87 MG/DL (ref 70–110)
HCT VFR BLD AUTO: 40.3 % (ref 37–48.5)
HGB BLD-MCNC: 13.2 G/DL (ref 12–16)
IMM GRANULOCYTES # BLD AUTO: 0.01 K/UL (ref 0–0.04)
IMM GRANULOCYTES NFR BLD AUTO: 0.2 % (ref 0–0.5)
LYMPHOCYTES # BLD AUTO: 1.5 K/UL (ref 1–4.8)
LYMPHOCYTES NFR BLD: 32 % (ref 18–48)
MAGNESIUM SERPL-MCNC: 1.9 MG/DL (ref 1.6–2.6)
MCH RBC QN AUTO: 27.7 PG (ref 27–31)
MCHC RBC AUTO-ENTMCNC: 32.8 G/DL (ref 32–36)
MCV RBC AUTO: 85 FL (ref 82–98)
MONOCYTES # BLD AUTO: 0.3 K/UL (ref 0.3–1)
MONOCYTES NFR BLD: 6.9 % (ref 4–15)
NEUTROPHILS # BLD AUTO: 2.8 K/UL (ref 1.8–7.7)
NEUTROPHILS NFR BLD: 60.1 % (ref 38–73)
NRBC BLD-RTO: 0 /100 WBC
PLATELET # BLD AUTO: ABNORMAL K/UL (ref 150–450)
PLATELET BLD QL SMEAR: ABNORMAL
PMV BLD AUTO: ABNORMAL FL (ref 9.2–12.9)
POTASSIUM SERPL-SCNC: 4.2 MMOL/L (ref 3.5–5.1)
PROT SERPL-MCNC: 7.3 G/DL (ref 6–8.4)
RBC # BLD AUTO: 4.77 M/UL (ref 4–5.4)
SODIUM SERPL-SCNC: 137 MMOL/L (ref 136–145)
TROPONIN I SERPL DL<=0.01 NG/ML-MCNC: <0.006 NG/ML (ref 0–0.03)
TSH SERPL DL<=0.005 MIU/L-ACNC: 2.03 UIU/ML (ref 0.4–4)
WBC # BLD AUTO: 4.62 K/UL (ref 3.9–12.7)

## 2024-03-23 PROCEDURE — 85025 COMPLETE CBC W/AUTO DIFF WBC: CPT | Performed by: EMERGENCY MEDICINE

## 2024-03-23 PROCEDURE — 83880 ASSAY OF NATRIURETIC PEPTIDE: CPT | Performed by: EMERGENCY MEDICINE

## 2024-03-23 PROCEDURE — 93005 ELECTROCARDIOGRAM TRACING: CPT

## 2024-03-23 PROCEDURE — 93010 ELECTROCARDIOGRAM REPORT: CPT | Mod: ,,, | Performed by: INTERNAL MEDICINE

## 2024-03-23 PROCEDURE — 80053 COMPREHEN METABOLIC PANEL: CPT | Performed by: EMERGENCY MEDICINE

## 2024-03-23 PROCEDURE — 25000003 PHARM REV CODE 250: Performed by: EMERGENCY MEDICINE

## 2024-03-23 PROCEDURE — 84443 ASSAY THYROID STIM HORMONE: CPT | Performed by: EMERGENCY MEDICINE

## 2024-03-23 PROCEDURE — 99285 EMERGENCY DEPT VISIT HI MDM: CPT | Mod: 25

## 2024-03-23 PROCEDURE — 83735 ASSAY OF MAGNESIUM: CPT | Performed by: EMERGENCY MEDICINE

## 2024-03-23 PROCEDURE — 84484 ASSAY OF TROPONIN QUANT: CPT | Performed by: EMERGENCY MEDICINE

## 2024-03-23 RX ORDER — LORAZEPAM 0.5 MG/1
0.5 TABLET ORAL EVERY 12 HOURS PRN
Qty: 8 TABLET | Refills: 0 | Status: SHIPPED | OUTPATIENT
Start: 2024-03-23 | End: 2024-04-04

## 2024-03-23 RX ORDER — LORAZEPAM 0.5 MG/1
1 TABLET ORAL
Status: COMPLETED | OUTPATIENT
Start: 2024-03-23 | End: 2024-03-23

## 2024-03-23 RX ADMIN — LORAZEPAM 1 MG: 0.5 TABLET ORAL at 02:03

## 2024-03-23 NOTE — DISCHARGE INSTRUCTIONS
Emergency department testing is within acceptable ranges.  Your symptoms may be related to anxiety.  You have been prescribed a short course of Ativan to use as needed for anxiety.  Continue the remainder of your medications as you have been prescribed.  Be sure to schedule close follow-up with your primary physician as well as Psychiatry to monitor and further evaluate your symptoms.  Return to the emergency department for any new, worsening or significantly concerning symptoms.    Thank you for coming to our Emergency Department today. It is important to remember that some problems are difficult to diagnose and may not be found during your first visit. Be sure to follow up with your primary care doctor and review any labs/imaging that was performed with them. If you do not have a primary care doctor, you may contact the one listed on your discharge paperwork or you may also call the [facility] Appointment Desk at [phone] to schedule an appointment with one.     All medications may potentially have side effects and it is impossible to predict which medications may give you side effects. If you feel that you are having a negative effect of any medication you should immediately stop taking them and seek medical attention.    Return to the ER with any questions/concerns, new/concerning symptoms, worsening or failure to improve. Do not drive or make any important decisions for 24 hours if you have received any pain medications, sedatives or mood altering drugs during your ER visit.

## 2024-03-23 NOTE — ED PROVIDER NOTES
"Encounter Date: 3/23/2024    SCRIBE #1 NOTE: I, Devika James, am scribing for, and in the presence of,  Emily Wang MD. I have scribed the following portions of the note - the EKG reading. Other sections scribed: HPI, ROS, PE, MDM.       History     Chief Complaint   Patient presents with    multiple cpmplaints     Patient reports feeling anxious, 2 weeks out of the month, wrote down list of anxious, nausea, fatigue, poor memory and not sleeping well, denies any pain, cough, congestion or fevers, denies falls or injuries for past few months     Grazyna Durand is a 68 y.o. female with a PHMx of HTN, HLD, and GERD that presents to the ED for anxiety that has been occurring intermediately for the pat 5-6 months. The patient notes associated symptoms of lack of sleep, decrease in appetite, weight loss, headache, the urge to cry, SOB, tinnitus, nausea, fatigue, poor memory, and feeling emotionally "out of control" . The patient reports compliant use of daily prescribed medications by her PCP in attempt to alleviate symptoms, but denies relief. The patient denies associated symptoms of general body pain, cough, chest pain, swelling, congestion, fever, suicidal thoughts, homicidal thoughts, or any recent injuries.    The history is provided by the patient. No  was used.     Review of patient's allergies indicates:   Allergen Reactions    Benadryl [diphenhydramine hcl]      cuase her bp to elevate    Prednisone Other (See Comments)     Reactions:  Increased blood pressure    Asa-calcium carb-mag-aluminum Other (See Comments)     Acid reflux  Acid reflux      Aspirin Other (See Comments)    Ibuprofen Other (See Comments)     Hx of stroke per patient. Patient  states that she cannot take.    Penicillins     Powder base no.200 Rash     Powder in gloves causes rash on hands, Denies any reaction to latex products     Past Medical History:   Diagnosis Date    Chronic pain     left arm    Hyperlipidemia     " "Hypertension     Stroke     2019     Past Surgical History:   Procedure Laterality Date    BUNIONECTOMY      LEG TENDON SURGERY Right      Family History   Problem Relation Age of Onset    Cancer Mother     No Known Problems Father     Ovarian cancer Sister      Social History     Tobacco Use    Smoking status: Never    Smokeless tobacco: Never   Substance Use Topics    Alcohol use: Not Currently     Comment: quit 10/19    Drug use: No     Review of Systems   Constitutional:  Positive for fatigue and unexpected weight change (Lost 4 lbs.). Negative for activity change (Loss of sleep.), appetite change (Decrease in appetite.) and fever.   HENT:  Positive for tinnitus. Negative for congestion.    Respiratory:  Positive for shortness of breath. Negative for cough.    Cardiovascular:  Negative for chest pain and leg swelling.   Gastrointestinal:  Positive for nausea.   Musculoskeletal:  Negative for myalgias.   Neurological:  Positive for headaches.   Psychiatric/Behavioral:  Positive for behavioral problems (Feels "out of control") and confusion (Memory problems.). Negative for hallucinations, self-injury and suicidal ideas. The patient is nervous/anxious.         No homicidal thoughts.       Physical Exam     Initial Vitals [03/23/24 1309]   BP Pulse Resp Temp SpO2   (!) 144/85 81 18 98.2 °F (36.8 °C) 99 %      MAP       --         Physical Exam    Nursing note and vitals reviewed.  Constitutional: She is not diaphoretic. No distress.   HENT:   Head: Normocephalic and atraumatic.   Protecting airway   Eyes: Conjunctivae and EOM are normal. No scleral icterus.   Neck: Neck supple. No tracheal deviation present.   Normal range of motion.  Cardiovascular:  Normal rate, regular rhythm and intact distal pulses.           Pulmonary/Chest: No stridor. No respiratory distress.   Speaking in full sentences   Abdominal: Abdomen is soft. She exhibits no distension. There is no abdominal tenderness.   Musculoskeletal:         " General: No tenderness or edema.      Cervical back: Normal range of motion and neck supple.     Neurological: She is alert. She has normal strength. No cranial nerve deficit or sensory deficit.   Skin: Skin is warm and dry.   Psychiatric: Her mood appears anxious. She expresses no homicidal and no suicidal ideation.         ED Course   Procedures  Labs Reviewed   CBC W/ AUTO DIFFERENTIAL - Abnormal; Notable for the following components:       Result Value    Platelet Estimate Clumped (*)     All other components within normal limits   COMPREHENSIVE METABOLIC PANEL - Abnormal; Notable for the following components:    CO2 21 (*)     BUN 5 (*)     Total Bilirubin 1.7 (*)     ALT 5 (*)     All other components within normal limits   TROPONIN I   B-TYPE NATRIURETIC PEPTIDE   MAGNESIUM   TSH   TROPONIN I     EKG Readings: (Independently Interpreted)   Initial Reading: No STEMI. Rhythm: Normal Sinus Rhythm. Heart Rate: 66. Ectopy: No Ectopy. Conduction: Normal. ST Segments: Normal ST Segments. T Waves: Normal. Axis: Normal.       Imaging Results              X-Ray Chest AP Portable (Final result)  Result time 03/23/24 14:02:23      Final result by Chilango Manning MD (03/23/24 14:02:23)                   Impression:      No detrimental change or radiographic acute intrathoracic process seen on this single view.      Electronically signed by: Chilango Manning MD  Date:    03/23/2024  Time:    14:02               Narrative:    EXAMINATION:  XR CHEST AP PORTABLE    CLINICAL HISTORY:  anxiety;    TECHNIQUE:  Single frontal view of the chest was performed.    COMPARISON:  Chest radiograph 04/10/2023 CT abdomen and pelvis 11/05/2022, cervical spine CT 11/07/2020    FINDINGS:  Patient is somewhat rotated.    No detrimental change.  Cardiomediastinal silhouette is midline with similar mild tortuosity of the aorta.  Heart is not significantly enlarged.  Pulmonary vasculature and hilar contours are within normal limits.  Few scattered  linear opacities throughout the lungs consistent with mild platelike scarring versus atelectasis.  The lungs are otherwise well expanded without consolidation, pleural effusion or pneumothorax.  No acute osseous process seen.  PA and lateral views can be obtained.                                       Medications   LORazepam tablet 1 mg (1 mg Oral Given 3/23/24 4376)     Medical Decision Making  This is a 69 y/o female complaining of anxiety with associated sleeplessness, decreased appetite, and emotional instability that has been occurring intermediately for the past 5-6 months. On exam there were no significant findings. Differential diagnosis include but are not limited to: anxiety, electrolyte abnormality, insomnia, GERD, and gastritis. In shared decision making with the patient I will order labs.    Patient is afebrile and in no acute distress at time history and physical.  Vitals within acceptable ranges.  EKG without definite acute ischemic changes.  CBC without leukocytosis or anemia.  Chemistry without acute renal failure or significant electrolyte abnormality.  BNP, troponin within normal ranges.  TSH within normal ranges.  Chest x-ray without acute abnormality.  Patient given Ativan in the emergency department.  On reassessment she reports significant improvement in her anxiety.  She is resting comfortably in stretcher.  I have low clinical suspicion of ACS as the cause of patient's symptoms given normal troponin, lack of definite acute ischemic changes on EKG.  Symptoms most consistent with anxiety.  Shared decision-making performed with patient she expresses comfort with discharge on trial of management with Ativan for anxiety, close follow-up with her primary physician.  Patient reports she has the information for behavior health follow-up which she has yet to contact.  The patient informed to schedule behavior health follow up.  Patient counseled extensively on cautious use of her medication.  counseled on supportive care, appropriate medication usage, concerning symptoms for which to return to ER and the importance of follow up. Understanding and agreement with treatment plan was expressed.     Amount and/or Complexity of Data Reviewed  External Data Reviewed: labs and ECG.  Labs: ordered.  Radiology: ordered.  ECG/medicine tests: ordered.    Risk  Prescription drug management.            Scribe Attestation:   Scribe #1: I performed the above scribed service and the documentation accurately describes the services I performed. I attest to the accuracy of the note.              This chart was completed using dictation software, as a result there may be some transcription errors.            I, Emily Wang , personally performed the services described in this documentation. All medical record entries made by the scribe were at my direction and in my presence. I have reviewed the chart and agree that the record reflects my personal performance and is accurate and complete.        Clinical Impression:  Final diagnoses:  [F41.9] Anxiety          ED Disposition Condition    Discharge Stable          ED Prescriptions       Medication Sig Dispense Start Date End Date Auth. Provider    LORazepam (ATIVAN) 0.5 MG tablet Take 1 tablet (0.5 mg total) by mouth every 12 (twelve) hours as needed for Anxiety. 8 tablet 3/23/2024 -- Emily Wang MD          Follow-up Information       Follow up With Specialties Details Why Contact Info    Hannah Hendricks, ANIRUDH Internal Medicine Schedule an appointment as soon as possible for a visit   [address]  [place] [address]  [phone]      [facility] Behavioral Health, Psychiatry, Psychology Schedule an appointment as soon as possible for a visit   [address] [facility] [place] [address]  [phone]               Eimly Wang MD  03/23/24 7000

## 2024-03-23 NOTE — ED TRIAGE NOTES
Pt presents to ED co of anxiety and insomnia. Pt states this happens once a month, has been taking anxiety medicine for the last 5 months for it, but it's not helping anymore. Pt also states she has not been getting much sleep due to increased anxiety. Denies any SOB, CP, N/V/C/D.

## 2024-03-28 LAB
OHS QRS DURATION: 84 MS
OHS QTC CALCULATION: 461 MS

## 2024-03-30 ENCOUNTER — HOSPITAL ENCOUNTER (EMERGENCY)
Facility: HOSPITAL | Age: 69
Discharge: HOME | End: 2024-03-30
Attending: STUDENT IN AN ORGANIZED HEALTH CARE EDUCATION/TRAINING PROGRAM
Payer: MEDICARE

## 2024-03-30 VITALS
OXYGEN SATURATION: 97 % | DIASTOLIC BLOOD PRESSURE: 79 MMHG | HEART RATE: 77 BPM | BODY MASS INDEX: 26.62 KG/M2 | RESPIRATION RATE: 19 BRPM | SYSTOLIC BLOOD PRESSURE: 139 MMHG | TEMPERATURE: 99 F | WEIGHT: 141 LBS | HEIGHT: 61 IN

## 2024-03-30 DIAGNOSIS — M79.10 MYALGIA: ICD-10-CM

## 2024-03-30 DIAGNOSIS — M25.559 HIP PAIN: ICD-10-CM

## 2024-03-30 DIAGNOSIS — R07.89 CHEST TIGHTNESS: ICD-10-CM

## 2024-03-30 DIAGNOSIS — E87.6 HYPOKALEMIA: Primary | ICD-10-CM

## 2024-03-30 DIAGNOSIS — K21.9 GASTROESOPHAGEAL REFLUX DISEASE, UNSPECIFIED WHETHER ESOPHAGITIS PRESENT: ICD-10-CM

## 2024-03-30 LAB
ALBUMIN SERPL BCP-MCNC: 4.2 G/DL (ref 3.5–5.2)
ALP SERPL-CCNC: 59 U/L (ref 55–135)
ALT SERPL W/O P-5'-P-CCNC: 6 U/L (ref 10–44)
AMPHET+METHAMPHET UR QL: NEGATIVE
ANION GAP SERPL CALC-SCNC: 13 MMOL/L (ref 8–16)
AST SERPL-CCNC: 14 U/L (ref 10–40)
BACTERIA #/AREA URNS HPF: NORMAL /HPF
BARBITURATES UR QL SCN>200 NG/ML: NEGATIVE
BASOPHILS # BLD AUTO: 0.02 K/UL (ref 0–0.2)
BASOPHILS NFR BLD: 0.2 % (ref 0–1.9)
BENZODIAZ UR QL SCN>200 NG/ML: NEGATIVE
BILIRUB SERPL-MCNC: 1.6 MG/DL (ref 0.1–1)
BILIRUB UR QL STRIP: NEGATIVE
BNP SERPL-MCNC: 16 PG/ML (ref 0–99)
BUN SERPL-MCNC: 3 MG/DL (ref 8–23)
BZE UR QL SCN: NEGATIVE
CALCIUM SERPL-MCNC: 9.5 MG/DL (ref 8.7–10.5)
CANNABINOIDS UR QL SCN: ABNORMAL
CHLORIDE SERPL-SCNC: 108 MMOL/L (ref 95–110)
CK SERPL-CCNC: 99 U/L (ref 20–180)
CLARITY UR: CLEAR
CO2 SERPL-SCNC: 21 MMOL/L (ref 23–29)
COLOR UR: COLORLESS
CREAT SERPL-MCNC: 0.9 MG/DL (ref 0.5–1.4)
CREAT UR-MCNC: 21.8 MG/DL (ref 15–325)
DIFFERENTIAL METHOD BLD: NORMAL
EOSINOPHIL # BLD AUTO: 0 K/UL (ref 0–0.5)
EOSINOPHIL NFR BLD: 0.1 % (ref 0–8)
ERYTHROCYTE [DISTWIDTH] IN BLOOD BY AUTOMATED COUNT: 14 % (ref 11.5–14.5)
EST. GFR  (NO RACE VARIABLE): >60 ML/MIN/1.73 M^2
GLUCOSE SERPL-MCNC: 108 MG/DL (ref 70–110)
GLUCOSE UR QL STRIP: NEGATIVE
HCT VFR BLD AUTO: 38.3 % (ref 37–48.5)
HGB BLD-MCNC: 12.5 G/DL (ref 12–16)
HGB UR QL STRIP: NEGATIVE
IMM GRANULOCYTES # BLD AUTO: 0.02 K/UL (ref 0–0.04)
IMM GRANULOCYTES NFR BLD AUTO: 0.2 % (ref 0–0.5)
KETONES UR QL STRIP: NEGATIVE
LEUKOCYTE ESTERASE UR QL STRIP: ABNORMAL
LYMPHOCYTES # BLD AUTO: 1.7 K/UL (ref 1–4.8)
LYMPHOCYTES NFR BLD: 20.8 % (ref 18–48)
MAGNESIUM SERPL-MCNC: 1.6 MG/DL (ref 1.6–2.6)
MCH RBC QN AUTO: 27.4 PG (ref 27–31)
MCHC RBC AUTO-ENTMCNC: 32.6 G/DL (ref 32–36)
MCV RBC AUTO: 84 FL (ref 82–98)
METHADONE UR QL SCN>300 NG/ML: NEGATIVE
MICROSCOPIC COMMENT: NORMAL
MONOCYTES # BLD AUTO: 0.6 K/UL (ref 0.3–1)
MONOCYTES NFR BLD: 7.2 % (ref 4–15)
NEUTROPHILS # BLD AUTO: 6 K/UL (ref 1.8–7.7)
NEUTROPHILS NFR BLD: 71.5 % (ref 38–73)
NITRITE UR QL STRIP: NEGATIVE
NRBC BLD-RTO: 0 /100 WBC
OPIATES UR QL SCN: NEGATIVE
PCP UR QL SCN>25 NG/ML: NEGATIVE
PH UR STRIP: 8 [PH] (ref 5–8)
PLATELET # BLD AUTO: 230 K/UL (ref 150–450)
PMV BLD AUTO: 10.3 FL (ref 9.2–12.9)
POTASSIUM SERPL-SCNC: 3 MMOL/L (ref 3.5–5.1)
PROT SERPL-MCNC: 7 G/DL (ref 6–8.4)
PROT UR QL STRIP: NEGATIVE
RBC # BLD AUTO: 4.56 M/UL (ref 4–5.4)
SODIUM SERPL-SCNC: 142 MMOL/L (ref 136–145)
SP GR UR STRIP: 1 (ref 1–1.03)
SQUAMOUS #/AREA URNS HPF: 1 /HPF
TOXICOLOGY INFORMATION: ABNORMAL
TROPONIN I SERPL DL<=0.01 NG/ML-MCNC: <0.006 NG/ML (ref 0–0.03)
URN SPEC COLLECT METH UR: ABNORMAL
UROBILINOGEN UR STRIP-ACNC: NEGATIVE EU/DL
WBC # BLD AUTO: 8.37 K/UL (ref 3.9–12.7)
WBC #/AREA URNS HPF: 1 /HPF (ref 0–5)

## 2024-03-30 PROCEDURE — 80053 COMPREHEN METABOLIC PANEL: CPT | Performed by: EMERGENCY MEDICINE

## 2024-03-30 PROCEDURE — 96375 TX/PRO/DX INJ NEW DRUG ADDON: CPT

## 2024-03-30 PROCEDURE — 63600175 PHARM REV CODE 636 W HCPCS: Performed by: STUDENT IN AN ORGANIZED HEALTH CARE EDUCATION/TRAINING PROGRAM

## 2024-03-30 PROCEDURE — 96361 HYDRATE IV INFUSION ADD-ON: CPT

## 2024-03-30 PROCEDURE — 82550 ASSAY OF CK (CPK): CPT | Performed by: EMERGENCY MEDICINE

## 2024-03-30 PROCEDURE — 93010 ELECTROCARDIOGRAM REPORT: CPT | Mod: ,,, | Performed by: INTERNAL MEDICINE

## 2024-03-30 PROCEDURE — 83880 ASSAY OF NATRIURETIC PEPTIDE: CPT | Performed by: EMERGENCY MEDICINE

## 2024-03-30 PROCEDURE — 96374 THER/PROPH/DIAG INJ IV PUSH: CPT

## 2024-03-30 PROCEDURE — 25000003 PHARM REV CODE 250: Performed by: STUDENT IN AN ORGANIZED HEALTH CARE EDUCATION/TRAINING PROGRAM

## 2024-03-30 PROCEDURE — 25000003 PHARM REV CODE 250: Performed by: EMERGENCY MEDICINE

## 2024-03-30 PROCEDURE — 81000 URINALYSIS NONAUTO W/SCOPE: CPT | Performed by: EMERGENCY MEDICINE

## 2024-03-30 PROCEDURE — 99285 EMERGENCY DEPT VISIT HI MDM: CPT | Mod: 25

## 2024-03-30 PROCEDURE — 93005 ELECTROCARDIOGRAM TRACING: CPT

## 2024-03-30 PROCEDURE — 83735 ASSAY OF MAGNESIUM: CPT | Performed by: EMERGENCY MEDICINE

## 2024-03-30 PROCEDURE — 84484 ASSAY OF TROPONIN QUANT: CPT | Performed by: EMERGENCY MEDICINE

## 2024-03-30 PROCEDURE — 85025 COMPLETE CBC W/AUTO DIFF WBC: CPT | Performed by: EMERGENCY MEDICINE

## 2024-03-30 PROCEDURE — 80307 DRUG TEST PRSMV CHEM ANLYZR: CPT | Performed by: EMERGENCY MEDICINE

## 2024-03-30 RX ORDER — FAMOTIDINE 40 MG/1
40 TABLET, FILM COATED ORAL DAILY
Qty: 60 TABLET | Refills: 0 | Status: SHIPPED | OUTPATIENT
Start: 2024-03-30 | End: 2024-03-30

## 2024-03-30 RX ORDER — LORAZEPAM 0.5 MG/1
0.5 TABLET ORAL
Status: COMPLETED | OUTPATIENT
Start: 2024-03-30 | End: 2024-03-30

## 2024-03-30 RX ORDER — MORPHINE SULFATE 4 MG/ML
4 INJECTION, SOLUTION INTRAMUSCULAR; INTRAVENOUS
Status: COMPLETED | OUTPATIENT
Start: 2024-03-30 | End: 2024-03-30

## 2024-03-30 RX ORDER — ESOMEPRAZOLE MAGNESIUM 40 MG/1
40 CAPSULE, DELAYED RELEASE ORAL
Qty: 60 CAPSULE | Refills: 0 | Status: SHIPPED | OUTPATIENT
Start: 2024-03-30

## 2024-03-30 RX ORDER — ESOMEPRAZOLE MAGNESIUM 40 MG/1
40 CAPSULE, DELAYED RELEASE ORAL
Qty: 60 CAPSULE | Refills: 0 | Status: SHIPPED | OUTPATIENT
Start: 2024-03-30 | End: 2024-03-30

## 2024-03-30 RX ORDER — FAMOTIDINE 10 MG/ML
20 INJECTION INTRAVENOUS
Status: COMPLETED | OUTPATIENT
Start: 2024-03-30 | End: 2024-03-30

## 2024-03-30 RX ORDER — ACETAMINOPHEN 500 MG
1000 TABLET ORAL
Status: COMPLETED | OUTPATIENT
Start: 2024-03-30 | End: 2024-03-30

## 2024-03-30 RX ORDER — ONDANSETRON HYDROCHLORIDE 2 MG/ML
4 INJECTION, SOLUTION INTRAVENOUS
Status: COMPLETED | OUTPATIENT
Start: 2024-03-30 | End: 2024-03-30

## 2024-03-30 RX ORDER — FAMOTIDINE 40 MG/1
40 TABLET, FILM COATED ORAL DAILY
Qty: 60 TABLET | Refills: 0 | Status: ON HOLD | OUTPATIENT
Start: 2024-03-30 | End: 2024-04-04 | Stop reason: HOSPADM

## 2024-03-30 RX ADMIN — MORPHINE SULFATE 4 MG: 4 INJECTION INTRAVENOUS at 08:03

## 2024-03-30 RX ADMIN — LORAZEPAM 0.5 MG: 0.5 TABLET ORAL at 07:03

## 2024-03-30 RX ADMIN — ONDANSETRON 4 MG: 2 INJECTION INTRAMUSCULAR; INTRAVENOUS at 08:03

## 2024-03-30 RX ADMIN — FAMOTIDINE 20 MG: 10 INJECTION INTRAVENOUS at 08:03

## 2024-03-30 RX ADMIN — POTASSIUM BICARBONATE 40 MEQ: 391 TABLET, EFFERVESCENT ORAL at 08:03

## 2024-03-30 RX ADMIN — SODIUM CHLORIDE, POTASSIUM CHLORIDE, SODIUM LACTATE AND CALCIUM CHLORIDE 500 ML: 600; 310; 30; 20 INJECTION, SOLUTION INTRAVENOUS at 09:03

## 2024-03-30 RX ADMIN — ACETAMINOPHEN 1000 MG: 500 TABLET ORAL at 07:03

## 2024-03-31 LAB
OHS QRS DURATION: 88 MS
OHS QTC CALCULATION: 393 MS

## 2024-03-31 NOTE — ED PROVIDER NOTES
"Encounter Date: 3/30/2024       History     Chief Complaint   Patient presents with    Anxiety     Arrives via ems with c/o anxiety. Reports having recent deaths in family, stats going to  today. Reports while driving began to have panic attack and pulled over at fire station. Reports began to have chest pain and tingling , states doing deep breathing techniques with pt and pt reports feeling better.     68-year-old female with past medical history of hypertension, hyperlipidemia, CVA, anxiety presents with a "panic attack" that started just prior to arrival.  Patient states that her death of her nephew who is 20 years old has been heavy on her mind in his  was today.  She did not go to his  but was at [employer] getting a shawl for Islam tomorrow, she left [employer] and got in her car with her daughter driving when she felt that her left leg had pain in the hip, she felt that it was going numb and when she looked in the mirror she felt that her face looked twisted.  She became very anxious about this.  She then states that she started shaking, and was "going in and out" however she states that she did not have a loss of consciousness she was able to her daughter the whole time.  Her daughter was trying to "hold her up" in the car but was having difficulty doing this and so the patient told her daughter to drive them to the fire station.  The ambulance then arrived and they helped her slow her breathing down which made her feel better.  They did not give her any medications.  She reports she has had decreased sleep recently and has lost weight, 5.5 lb.  She states she has been feeling anxious and depressed recently.  She denies any suicide or homicidal ideations.  She denies any auditory visual hallucinations.  She has not appointment with Psychiatry on Tuesday and this will be her 1st appointment.  She did take her citalopram today.  Her last Ativan dose was yesterday.  She also reports taking " "amlodipine and atorvastatin and recently started on B12 and D3 pills.  She denies any alcohol tobacco or other drugs.  She reports feeling like her heart was palpitating.  But denies chest pain.  She reports shortness of breath during this event but denies this currently.  She has had panic attacks in the past and states this feels similar although she feels that she was shaking more this time than last time.  She reports left hip pain but denies any falls or trauma.  States this started while she was in the car on the way home from [employer] and that it feels like a "Charley horse." It is worse when she tries to lift her leg.  She denies any back pain.    She reports allergies to Benadryl, prednisone, aspirin, ibuprofen, penicillin.  She reports surgeries of a leg tendon repair and a bunionectomy.  She denies alcohol tobacco or illicit drugs.        Review of patient's allergies indicates:   Allergen Reactions    Benadryl [diphenhydramine hcl]      cuase her bp to elevate    Prednisone Other (See Comments)     Reactions:  Increased blood pressure    Asa-calcium carb-mag-aluminum Other (See Comments)     Acid reflux  Acid reflux      Aspirin Other (See Comments)    Ibuprofen Other (See Comments)     Hx of stroke per patient. Patient  states that she cannot take.    Penicillins     Powder base no.200 Rash     Powder in gloves causes rash on hands, Denies any reaction to latex products     Past Medical History:   Diagnosis Date    Chronic pain     left arm    Hyperlipidemia     Hypertension     Stroke     2019     Past Surgical History:   Procedure Laterality Date    BUNIONECTOMY      LEG TENDON SURGERY Right      Family History   Problem Relation Age of Onset    Cancer Mother     No Known Problems Father     Ovarian cancer Sister      Social History     Tobacco Use    Smoking status: Never    Smokeless tobacco: Never   Substance Use Topics    Alcohol use: Not Currently     Comment: quit 10/19    Drug use: No "     Review of Systems   Constitutional:  Positive for unexpected weight change. Negative for chills, diaphoresis and fever.   Eyes:  Negative for photophobia and visual disturbance.   Respiratory:  Positive for shortness of breath. Negative for cough.    Cardiovascular:  Positive for palpitations. Negative for chest pain and leg swelling.   Gastrointestinal:  Negative for abdominal pain, blood in stool, constipation, diarrhea, nausea and vomiting.   Genitourinary:  Negative for dysuria, flank pain, frequency, hematuria and urgency.   Musculoskeletal:  Positive for arthralgias. Negative for back pain, neck pain and neck stiffness.   Skin:  Negative for rash and wound.   Neurological:  Negative for weakness, light-headedness, numbness and headaches.   Psychiatric/Behavioral:  Positive for sleep disturbance. Negative for confusion, hallucinations and suicidal ideas. The patient is nervous/anxious.        Physical Exam     Initial Vitals [03/30/24 1826]   BP Pulse Resp Temp SpO2   137/85 97 20 98.1 °F (36.7 °C) 98 %      MAP       --         Physical Exam    Nursing note and vitals reviewed.  Constitutional: She appears well-developed and well-nourished. She is not diaphoretic. She appears distressed.   HENT:   Head: Normocephalic and atraumatic.   Mouth/Throat: Oropharynx is clear and moist. No oropharyngeal exudate.   Eyes: Conjunctivae and EOM are normal. Pupils are equal, round, and reactive to light. Right eye exhibits no discharge. Left eye exhibits no discharge.   Neck: Neck supple. No JVD present.   Normal range of motion.  Cardiovascular:  Normal rate, regular rhythm, normal heart sounds and intact distal pulses.     Exam reveals no gallop and no friction rub.       No murmur heard.  Equal pulses bilaterally   Pulmonary/Chest: Breath sounds normal. No respiratory distress. She has no wheezes. She has no rhonchi. She has no rales.   Slightly tachypneic initially when tremulous and anxious, improved during  history and physical.   Abdominal: Abdomen is soft. Bowel sounds are normal. She exhibits no distension. There is no abdominal tenderness.   Negative Ray's sign, no CVA tenderness There is no rebound and no guarding.   Musculoskeletal:         General: No tenderness or edema.      Cervical back: Normal range of motion and neck supple.      Comments: No midline back or neck tenderness.  No pain with palpating over the left hip.  No pain with passive range of motion of the left hip.  Patient reports pain with active range of motion of the left hip and minimal pain to the left knee with active range of motion.  No pain with passive range of motion.  2+ DP pulses.  Brisk cap refill.  No calf tenderness.  No overlying skin changes in the region.     Lymphadenopathy:     She has no cervical adenopathy.   Neurological: She is alert and oriented to person, place, and time. She has normal strength. No cranial nerve deficit or sensory deficit. GCS score is 15. GCS eye subscore is 4. GCS verbal subscore is 5. GCS motor subscore is 6.   Moves all extremities and carries on conversation. CN- II: PERRL; III/IV/VI: EOMI w/out evidence of nystagmus; V: no deficits appreciated to light touch bilateral face; VII: no facial weakness, no facial asymmetry--patient is missing her partial dentures on the right side however with full smile patient has no facial asymmetry.. Eyebrow raise symmetric. Smile symmetric; IX/X: palate midline, and raises symmetrically; XI: shoulder shrug 5/5 bilaterally; XII: tongue is midline w/out asymmetry. Strength 5/5 to bilateral upper and lower extremities, sensation intact to light touch.  Patient with pain with lifting the left leg but she is able to lift it fully  and hold off for 10 seconds with encouragement.   Skin: Skin is warm and dry. Capillary refill takes less than 2 seconds.   Psychiatric: Thought content normal.   Appears anxious, tremulous, denies suicidal homicidal ideation.  Denies  "auditory visual hallucinations.  This improved during the interview and patient now more calm and relaxed.         ED Course   Procedures  Labs Reviewed   CBC W/ AUTO DIFFERENTIAL   COMPREHENSIVE METABOLIC PANEL   DRUG SCREEN PANEL, URINE EMERGENCY   MAGNESIUM   TROPONIN I   URINALYSIS, REFLEX TO URINE CULTURE   B-TYPE NATRIURETIC PEPTIDE   CK     EKG Readings: (Independently Interpreted)   Normal sinus rhythm at 91.  No ST elevation or significant depression.  T-wave flattening diffusely.  QTC is 393.  Compared to her previous EKG from March 23rd this EKG is similar although the rate is increased.         Imaging Results              X-Ray Chest AP Portable (In process)                      X-Ray Hip 2 or 3 views Left (with Pelvis when performed) (In process)                      Medications   LORazepam tablet 0.5 mg (0.5 mg Oral Given 3/30/24 1905)     Medical Decision Making  Amount and/or Complexity of Data Reviewed  Labs: ordered.  Radiology: ordered.    Risk  Prescription drug management.    MDM  68-year-old female with past medical history of hypertension, hyperlipidemia, anxiety, CVA (remote hypertensive hemorrhage in the right basal ganglia suspected on MRI from 2021) presents with panic attack.  Patient reports that she felt pain to her left hip with numbness to her left leg and when she looked in the mirror felt that her face was twisting which made her feel very anxious and caused her to "go in and out.."She denies any true loss of consciousness as she was conscious during this entire time.  Patient's neuro exam is normal at this time.  She is full strength in the left leg however this is limited by pain to the left hip with active range of motion.  No pain with passive range of motion or palpation.  No trauma reported.  No facial asymmetry noted.  Sensation intact bilaterally.  Differential diagnosis includes was not limited to panic disorder, arthritis, occult fracture, muscle spasm, metabolic " derangement, arrhythmia, anemia, malignancy, less likely TIA, radiculopathy, DVT.  Will obtain labs, urine, chest x-ray, x-ray of her hip, CT head.  Care signed out to Dr. Harris at 7:00 p.m. pending full workup and further evaluation and care.                                  Clinical Impression:  Final diagnoses:  [R07.89] Chest tightness  [M25.559] Hip pain                 Rebekah Leal MD  03/30/24 1925       Rebekah Leal MD  03/30/24 1928

## 2024-03-31 NOTE — PROVIDER PROGRESS NOTES - EMERGENCY DEPT.
Encounter Date: 3/30/2024    ED Physician Progress Notes        ED Physician Hand-off Note:    ED Course: I assumed care of patient from off-going ED physician, Dr. Leal.  Briefly, Patient is presented for L thigh pain atraumatic, nonradicular, chest tightness, anxiety.    At the time of signout plan was pending labs, CTH, UA, CXR, XR hip.  Patient was found to have hypokalemia to 3.0, which may be the cause of her left thigh muscular pain and spasm, patient is not on any diuretics per chart review, patient and daughter at bedside deny any new medications, has had a history of hypokalemia in the past.  Given 40 mEq of potassium repletion in the ED orally, along with morphine for left thigh pain, and Zofran for mild nausea after potassium repletion orally.  Labs obtained including CBC, CMP significant for hypokalemia, mild bilirubin increase consistent with prior, CPK normal, troponin and BNP normal, TSH obtained 7 days ago was normal.  CT head, x-ray of the chest, and x-ray of the left pelvis with no acute abnormalities, chronic changes seen on CT head.    Disposition: discharge with f/u to PMD, GI  Short course of oral potassium prescribed for hypokalemia, which may have been to laxative use several days ago.  Also prescribed ppi, and Pepcid for preprandial nausea and epigastric burning symptoms.    Patient comfortable with discharge. Patient counseled regarding exam, results, diagnosis, treatment, and plan.    Impression: hypokalemia, GERD    Final diagnoses:  [R07.89] Chest tightness  [M25.559] Hip pain

## 2024-03-31 NOTE — DISCHARGE INSTRUCTIONS
You were seen in the ER today for your left thigh pain, and acid reflux, please take Nexium once a day 1 hour before eating, Pepcid, and follow up with Gastroenterology.  You were also found to have low potassium, which may have been the cause of your muscle pain, for the next 5 days, please take 1 potassium tablet every day, and follow up with your primary care doctor for further workup of why you may have low potassium and a recheck of your potassium level.    Thank you for coming to our Emergency Department today. It is important to remember that some problems or medical conditions are difficult to diagnose and may not be found or addressed during your Emergency Department visit.  These conditions often start with non-specific symptoms and can only be diagnosed on follow up visits with your primary care physician or specialist when the symptoms continue or change. Please remember that all medical conditions can change, and we cannot predict how you will be feeling tomorrow or the next day. Return to the ER with any questions/concerns, new/concerning symptoms, worsening or failure to improve.       Be sure to follow up with your primary care doctor and review all labs/imaging/tests that were performed during your ER visit with them. It is very common for us to identify non-emergent incidental findings which must be followed up with your primary care physician.  Some labs/imaging/tests may be outside of the normal range, and require non-emergent follow-up and/or further investigation/treatment/procedures/testing to help diagnose/exclude/prevent complications or other potentially serious medical conditions. Some abnormalities may not have been discussed or addressed during your ER visit. Some lab results may not return during your ER visit but can be accessible by downloading the free [facility] My[facility] shailesh or by visiting [email or web address] . It is important for you to review all labs/imaging/tests which are  outside of the normal range with your physician.    An ER visit does not replace a primary care visit, and many screening tests or follow-up tests cannot be ordered by an ER doctor or performed by the ER. Some tests may even require pre-approval.    If you do not have a primary care doctor, you may contact the one listed on your discharge paperwork or you may also call the [facility] Appointment Desk at [phone] , or [facility] at  [phone] to schedule an appointment, or establish care with a primary care doctor or even a specialist and to obtain information about local resources. It is important to your health that you have a primary care doctor.    Please take all medications as directed. We have done our best to select a medication for you that will treat your condition however, all medications may potentially have side-effects and it is impossible to predict which medications may give you side-effects or what those side-effects (if any) those medications may give you.  If you feel that you are having a negative effect or side-effect of any medication you should stop taking those medications immediately and seek medical attention. If you feel that you are having a life-threatening reaction call 911.        Do not drive, swim, climb to height, take a bath, operate heavy machinery, drink alcohol or take potentially sedating medications, sign any legal documents or make any important decisions for 24 hours if you have received any pain medications, sedatives or mood altering drugs during your ER visit or within 24 hours of taking them if they have been prescribed to you.     You can find additional resources for Dentists, hearing aids, durable medical equipment, low cost pharmacies and other resources at [email or web address]

## 2024-04-03 ENCOUNTER — HOSPITAL ENCOUNTER (OUTPATIENT)
Facility: HOSPITAL | Age: 69
Discharge: HOME | End: 2024-04-05
Attending: EMERGENCY MEDICINE | Admitting: STUDENT IN AN ORGANIZED HEALTH CARE EDUCATION/TRAINING PROGRAM
Payer: MEDICARE

## 2024-04-03 DIAGNOSIS — R10.13 EPIGASTRIC ABDOMINAL PAIN: ICD-10-CM

## 2024-04-03 DIAGNOSIS — R52 INTRACTABLE PAIN: Primary | ICD-10-CM

## 2024-04-03 DIAGNOSIS — R10.9 ABDOMINAL PAIN: ICD-10-CM

## 2024-04-03 DIAGNOSIS — R20.2 NUMBNESS AND TINGLING OF RIGHT FACE: ICD-10-CM

## 2024-04-03 DIAGNOSIS — R20.0 NUMBNESS AND TINGLING OF RIGHT FACE: ICD-10-CM

## 2024-04-03 DIAGNOSIS — R07.89 CHEST PAIN, ATYPICAL: ICD-10-CM

## 2024-04-03 DIAGNOSIS — Z86.73 HISTORY OF STROKE: ICD-10-CM

## 2024-04-03 DIAGNOSIS — R10.13 EPIGASTRIC PAIN: ICD-10-CM

## 2024-04-03 DIAGNOSIS — R07.9 CHEST PAIN: ICD-10-CM

## 2024-04-03 LAB
ALBUMIN SERPL BCP-MCNC: 4.6 G/DL (ref 3.5–5.2)
ALP SERPL-CCNC: 72 U/L (ref 55–135)
ALT SERPL W/O P-5'-P-CCNC: 8 U/L (ref 10–44)
ANION GAP SERPL CALC-SCNC: 12 MMOL/L (ref 8–16)
AST SERPL-CCNC: 25 U/L (ref 10–40)
BACTERIA #/AREA URNS HPF: NORMAL /HPF
BASOPHILS # BLD AUTO: 0.03 K/UL (ref 0–0.2)
BASOPHILS NFR BLD: 0.3 % (ref 0–1.9)
BILIRUB SERPL-MCNC: 1.8 MG/DL (ref 0.1–1)
BILIRUB UR QL STRIP: NEGATIVE
BNP SERPL-MCNC: <10 PG/ML (ref 0–99)
BUN SERPL-MCNC: 4 MG/DL (ref 8–23)
CALCIUM SERPL-MCNC: 10.7 MG/DL (ref 8.7–10.5)
CHLORIDE SERPL-SCNC: 106 MMOL/L (ref 95–110)
CLARITY UR: ABNORMAL
CO2 SERPL-SCNC: 22 MMOL/L (ref 23–29)
COLOR UR: YELLOW
CREAT SERPL-MCNC: 1 MG/DL (ref 0.5–1.4)
D DIMER PPP IA.FEU-MCNC: 0.81 MG/L FEU
DIFFERENTIAL METHOD BLD: ABNORMAL
EOSINOPHIL # BLD AUTO: 0 K/UL (ref 0–0.5)
EOSINOPHIL NFR BLD: 0.1 % (ref 0–8)
ERYTHROCYTE [DISTWIDTH] IN BLOOD BY AUTOMATED COUNT: 14.3 % (ref 11.5–14.5)
EST. GFR  (NO RACE VARIABLE): >60 ML/MIN/1.73 M^2
GLUCOSE SERPL-MCNC: 98 MG/DL (ref 70–110)
GLUCOSE UR QL STRIP: NEGATIVE
HCT VFR BLD AUTO: 47 % (ref 37–48.5)
HGB BLD-MCNC: 15.1 G/DL (ref 12–16)
HGB UR QL STRIP: NEGATIVE
IMM GRANULOCYTES # BLD AUTO: 0.03 K/UL (ref 0–0.04)
IMM GRANULOCYTES NFR BLD AUTO: 0.3 % (ref 0–0.5)
KETONES UR QL STRIP: NEGATIVE
LEUKOCYTE ESTERASE UR QL STRIP: ABNORMAL
LIPASE SERPL-CCNC: 16 U/L (ref 4–60)
LYMPHOCYTES # BLD AUTO: 1.8 K/UL (ref 1–4.8)
LYMPHOCYTES NFR BLD: 15.1 % (ref 18–48)
MAGNESIUM SERPL-MCNC: 2.4 MG/DL (ref 1.6–2.6)
MCH RBC QN AUTO: 27.2 PG (ref 27–31)
MCHC RBC AUTO-ENTMCNC: 32.1 G/DL (ref 32–36)
MCV RBC AUTO: 85 FL (ref 82–98)
MICROSCOPIC COMMENT: NORMAL
MONOCYTES # BLD AUTO: 0.7 K/UL (ref 0.3–1)
MONOCYTES NFR BLD: 6.3 % (ref 4–15)
NEUTROPHILS # BLD AUTO: 9 K/UL (ref 1.8–7.7)
NEUTROPHILS NFR BLD: 77.9 % (ref 38–73)
NITRITE UR QL STRIP: NEGATIVE
NRBC BLD-RTO: 0 /100 WBC
PH UR STRIP: 7 [PH] (ref 5–8)
PLATELET # BLD AUTO: 276 K/UL (ref 150–450)
PMV BLD AUTO: 10.7 FL (ref 9.2–12.9)
POTASSIUM SERPL-SCNC: 3.6 MMOL/L (ref 3.5–5.1)
PROT SERPL-MCNC: 8 G/DL (ref 6–8.4)
PROT UR QL STRIP: NEGATIVE
RBC # BLD AUTO: 5.56 M/UL (ref 4–5.4)
SODIUM SERPL-SCNC: 140 MMOL/L (ref 136–145)
SP GR UR STRIP: 1.01 (ref 1–1.03)
SQUAMOUS #/AREA URNS HPF: 5 /HPF
TROPONIN I SERPL DL<=0.01 NG/ML-MCNC: 0.01 NG/ML (ref 0–0.03)
TROPONIN I SERPL DL<=0.01 NG/ML-MCNC: <0.006 NG/ML (ref 0–0.03)
URN SPEC COLLECT METH UR: ABNORMAL
UROBILINOGEN UR STRIP-ACNC: NEGATIVE EU/DL
WBC # BLD AUTO: 11.56 K/UL (ref 3.9–12.7)
WBC #/AREA URNS HPF: 0 /HPF (ref 0–5)

## 2024-04-03 PROCEDURE — 93005 ELECTROCARDIOGRAM TRACING: CPT

## 2024-04-03 PROCEDURE — 99285 EMERGENCY DEPT VISIT HI MDM: CPT | Mod: 25

## 2024-04-03 PROCEDURE — 96376 TX/PRO/DX INJ SAME DRUG ADON: CPT | Mod: 59

## 2024-04-03 PROCEDURE — G0378 HOSPITAL OBSERVATION PER HR: HCPCS

## 2024-04-03 PROCEDURE — C9113 INJ PANTOPRAZOLE SODIUM, VIA: HCPCS | Performed by: EMERGENCY MEDICINE

## 2024-04-03 PROCEDURE — 83735 ASSAY OF MAGNESIUM: CPT | Performed by: EMERGENCY MEDICINE

## 2024-04-03 PROCEDURE — 85379 FIBRIN DEGRADATION QUANT: CPT | Performed by: EMERGENCY MEDICINE

## 2024-04-03 PROCEDURE — 93010 ELECTROCARDIOGRAM REPORT: CPT | Mod: ,,, | Performed by: INTERNAL MEDICINE

## 2024-04-03 PROCEDURE — 83880 ASSAY OF NATRIURETIC PEPTIDE: CPT | Performed by: EMERGENCY MEDICINE

## 2024-04-03 PROCEDURE — 84484 ASSAY OF TROPONIN QUANT: CPT | Mod: 91 | Performed by: EMERGENCY MEDICINE

## 2024-04-03 PROCEDURE — 63600175 PHARM REV CODE 636 W HCPCS: Mod: JZ,JG | Performed by: EMERGENCY MEDICINE

## 2024-04-03 PROCEDURE — 81000 URINALYSIS NONAUTO W/SCOPE: CPT | Performed by: EMERGENCY MEDICINE

## 2024-04-03 PROCEDURE — 96374 THER/PROPH/DIAG INJ IV PUSH: CPT

## 2024-04-03 PROCEDURE — 96361 HYDRATE IV INFUSION ADD-ON: CPT

## 2024-04-03 PROCEDURE — 85025 COMPLETE CBC W/AUTO DIFF WBC: CPT | Performed by: EMERGENCY MEDICINE

## 2024-04-03 PROCEDURE — 83690 ASSAY OF LIPASE: CPT | Performed by: EMERGENCY MEDICINE

## 2024-04-03 PROCEDURE — 25000003 PHARM REV CODE 250: Performed by: EMERGENCY MEDICINE

## 2024-04-03 PROCEDURE — 80053 COMPREHEN METABOLIC PANEL: CPT | Performed by: EMERGENCY MEDICINE

## 2024-04-03 PROCEDURE — 96375 TX/PRO/DX INJ NEW DRUG ADDON: CPT | Mod: 59

## 2024-04-03 PROCEDURE — 63600175 PHARM REV CODE 636 W HCPCS: Performed by: EMERGENCY MEDICINE

## 2024-04-03 PROCEDURE — 25500020 PHARM REV CODE 255: Performed by: EMERGENCY MEDICINE

## 2024-04-03 RX ORDER — ACETAMINOPHEN 325 MG/1
650 TABLET ORAL EVERY 4 HOURS PRN
Status: DISCONTINUED | OUTPATIENT
Start: 2024-04-03 | End: 2024-04-05 | Stop reason: HOSPADM

## 2024-04-03 RX ORDER — NALOXONE HCL 0.4 MG/ML
0.02 VIAL (ML) INJECTION
Status: DISCONTINUED | OUTPATIENT
Start: 2024-04-03 | End: 2024-04-05 | Stop reason: HOSPADM

## 2024-04-03 RX ORDER — ACETAMINOPHEN 500 MG
1000 TABLET ORAL
Status: COMPLETED | OUTPATIENT
Start: 2024-04-03 | End: 2024-04-03

## 2024-04-03 RX ORDER — IBUPROFEN 200 MG
24 TABLET ORAL
Status: DISCONTINUED | OUTPATIENT
Start: 2024-04-03 | End: 2024-04-05 | Stop reason: HOSPADM

## 2024-04-03 RX ORDER — LIDOCAINE HYDROCHLORIDE 20 MG/ML
15 SOLUTION OROPHARYNGEAL ONCE
Status: COMPLETED | OUTPATIENT
Start: 2024-04-03 | End: 2024-04-03

## 2024-04-03 RX ORDER — PANTOPRAZOLE SODIUM 40 MG/10ML
40 INJECTION, POWDER, LYOPHILIZED, FOR SOLUTION INTRAVENOUS
Status: COMPLETED | OUTPATIENT
Start: 2024-04-03 | End: 2024-04-03

## 2024-04-03 RX ORDER — MORPHINE SULFATE 4 MG/ML
2 INJECTION, SOLUTION INTRAMUSCULAR; INTRAVENOUS
Status: COMPLETED | OUTPATIENT
Start: 2024-04-03 | End: 2024-04-03

## 2024-04-03 RX ORDER — SODIUM CHLORIDE 9 MG/ML
1000 INJECTION, SOLUTION INTRAVENOUS
Status: COMPLETED | OUTPATIENT
Start: 2024-04-03 | End: 2024-04-03

## 2024-04-03 RX ORDER — IBUPROFEN 200 MG
16 TABLET ORAL
Status: DISCONTINUED | OUTPATIENT
Start: 2024-04-03 | End: 2024-04-05 | Stop reason: HOSPADM

## 2024-04-03 RX ORDER — ALUMINUM HYDROXIDE, MAGNESIUM HYDROXIDE, AND SIMETHICONE 1200; 120; 1200 MG/30ML; MG/30ML; MG/30ML
30 SUSPENSION ORAL ONCE
Status: COMPLETED | OUTPATIENT
Start: 2024-04-03 | End: 2024-04-03

## 2024-04-03 RX ORDER — TALC
6 POWDER (GRAM) TOPICAL NIGHTLY PRN
Status: DISCONTINUED | OUTPATIENT
Start: 2024-04-03 | End: 2024-04-05 | Stop reason: HOSPADM

## 2024-04-03 RX ORDER — ONDANSETRON HYDROCHLORIDE 2 MG/ML
4 INJECTION, SOLUTION INTRAVENOUS EVERY 8 HOURS PRN
Status: DISCONTINUED | OUTPATIENT
Start: 2024-04-03 | End: 2024-04-05 | Stop reason: HOSPADM

## 2024-04-03 RX ORDER — SODIUM CHLORIDE 0.9 % (FLUSH) 0.9 %
10 SYRINGE (ML) INJECTION EVERY 12 HOURS PRN
Status: DISCONTINUED | OUTPATIENT
Start: 2024-04-03 | End: 2024-04-05 | Stop reason: HOSPADM

## 2024-04-03 RX ORDER — GLUCAGON 1 MG
1 KIT INJECTION
Status: DISCONTINUED | OUTPATIENT
Start: 2024-04-03 | End: 2024-04-05 | Stop reason: HOSPADM

## 2024-04-03 RX ADMIN — IOHEXOL 75 ML: 350 INJECTION, SOLUTION INTRAVENOUS at 04:04

## 2024-04-03 RX ADMIN — MORPHINE SULFATE 2 MG: 4 INJECTION, SOLUTION INTRAMUSCULAR; INTRAVENOUS at 09:04

## 2024-04-03 RX ADMIN — LIDOCAINE HYDROCHLORIDE 15 ML: 20 SOLUTION ORAL at 09:04

## 2024-04-03 RX ADMIN — ACETAMINOPHEN 1000 MG: 500 TABLET ORAL at 06:04

## 2024-04-03 RX ADMIN — SODIUM CHLORIDE 1000 ML: 9 INJECTION, SOLUTION INTRAVENOUS at 10:04

## 2024-04-03 RX ADMIN — ALUMINUM HYDROXIDE, MAGNESIUM HYDROXIDE, AND DIMETHICONE 30 ML: 200; 20; 200 SUSPENSION ORAL at 09:04

## 2024-04-03 RX ADMIN — PANTOPRAZOLE SODIUM 40 MG: 40 INJECTION, POWDER, FOR SOLUTION INTRAVENOUS at 01:04

## 2024-04-03 RX ADMIN — MORPHINE SULFATE 2 MG: 4 INJECTION, SOLUTION INTRAMUSCULAR; INTRAVENOUS at 02:04

## 2024-04-03 NOTE — ED NOTES
Pt presents with chest heaviness, sob, trouble swallowing, nausea and worsening of GERD.  Reports no improvement with meds.  Also, c/o cramping to ble saturday    Patient identifiers verified by spelling and stated name on armband along with .     Review of patient's allergies indicates:  Review of patient's allergies indicates:   Allergen Reactions    Benadryl [diphenhydramine hcl]      cuase her bp to elevate    Prednisone Other (See Comments)     Reactions:  Increased blood pressure    Asa-calcium carb-mag-aluminum Other (See Comments)     Acid reflux  Acid reflux      Aspirin Other (See Comments)    Ibuprofen Other (See Comments)     Hx of stroke per patient. Patient  states that she cannot take.    Penicillins     Powder base no.200 Rash     Powder in gloves causes rash on hands, Denies any reaction to latex products     Patient updated on plan of care and changed into hospital gown.  Placed on continuous CM/SpO2/NIBP.  Bed locked and in low position with side rails up x2, call light within reach, family at bedside.     Will continue to monitor.

## 2024-04-03 NOTE — ED PROVIDER NOTES
"Encounter Date: 4/3/2024    SCRIBE #1 NOTE: I, Raquel Harmon, am scribing for, and in the presence of,  Sathish Baeza MD. I have scribed the following portions of the note - Other sections scribed: HPI, ROS, PE.       History     Chief Complaint   Patient presents with    Chest Pain     Pt reports chest pain, SOB, anxiety, and nausea since x 3 days.  Pt was seen here for Reflux stating she did take her meds as prescribed but its not getting any better.      Ms. Grazyna Durand presents to the ED with mid-sternal chest pain radiating to her back for 2 weeks. Patient reports the pain feels "like a knot" and is causing some heaviness. States she is not able to eat anything and has not in 5 days because she feels as though it gets stuck in the area of pain. States she was seen for similar symptoms 5 days ago and prescribed Pepcid and Nexium, and states they have not been alleviating her symptoms at all and it is getting worse and she is unable to eat anything. Also states she is having some left hand numbness and night sweats that started 2 weeks ago, around the same time as her other symptoms. Endorses 1 episode of diarrhea this morning. Notes she does have a GI specialist appointment in 1 week, but has never had an endoscopy or colonoscopy before. Notes she does endorse 2 of her medications at night and immediately lays down after taking them and has been doing that for some time. PMHx significant for PMHx significant for HTN, HLD, CVA, carpel tunnel syndrome of left wrist, GERD. No other exacerbating or alleviating factors. Denies fever, vomiting, LE swelling, or other associated symptoms.       The history is provided by the patient. No  was used.     Review of patient's allergies indicates:   Allergen Reactions    Benadryl [diphenhydramine hcl]      cuase her bp to elevate    Prednisone Other (See Comments)     Reactions:  Increased blood pressure    Asa-calcium carb-mag-aluminum Other " (See Comments)     Acid reflux  Acid reflux      Aspirin Other (See Comments)    Ibuprofen Other (See Comments)     Hx of stroke per patient. Patient  states that she cannot take.    Penicillins     Powder base no.200 Rash     Powder in gloves causes rash on hands, Denies any reaction to latex products     Past Medical History:   Diagnosis Date    Chronic pain     left arm    Hyperlipidemia     Hypertension     Stroke     2019     Past Surgical History:   Procedure Laterality Date    BUNIONECTOMY      LEG TENDON SURGERY Right      Family History   Problem Relation Age of Onset    Cancer Mother     No Known Problems Father     Ovarian cancer Sister      Social History     Tobacco Use    Smoking status: Never    Smokeless tobacco: Never   Substance Use Topics    Alcohol use: Not Currently     Comment: quit 10/19    Drug use: No     Review of Systems   Constitutional:  Negative for fever.        (+) Night sweats.    Cardiovascular:  Positive for chest pain (mid-sternal, lower). Negative for leg swelling.   Gastrointestinal:  Positive for diarrhea. Negative for vomiting.   Musculoskeletal:  Positive for back pain (secondary to chest pain).        Epigastric pain radiating to back when taking any liquid or food.    Neurological:  Positive for numbness (left hand).   All other systems reviewed and are negative.      Physical Exam     Initial Vitals [04/03/24 1038]   BP Pulse Resp Temp SpO2   122/88 108 18 97.8 °F (36.6 °C) 100 %      MAP       --         Physical Exam    Nursing note and vitals reviewed.  Constitutional: She appears well-developed and well-nourished. She is not diaphoretic. No distress.   HENT:   Head: Normocephalic and atraumatic.   Eyes: Conjunctivae and EOM are normal.   Cardiovascular:  Regular rhythm and normal heart sounds.   Tachycardia present.         No murmur heard.  Pulmonary/Chest: Breath sounds normal. No respiratory distress.   Abdominal: Abdomen is soft. Bowel sounds are normal. She  exhibits no distension and no mass. There is no abdominal tenderness. There is no guarding.   Musculoskeletal:         General: No tenderness or edema. Normal range of motion.     Neurological: She is oriented to person, place, and time. GCS score is 15. GCS eye subscore is 4. GCS verbal subscore is 5. GCS motor subscore is 6.   Skin: Skin is warm and dry. No rash noted. No erythema.   Psychiatric: She has a normal mood and affect. Thought content normal.         ED Course   Procedures  Labs Reviewed   CBC W/ AUTO DIFFERENTIAL - Abnormal; Notable for the following components:       Result Value    RBC 5.56 (*)     Gran # (ANC) 9.0 (*)     Gran % 77.9 (*)     Lymph % 15.1 (*)     All other components within normal limits   COMPREHENSIVE METABOLIC PANEL - Abnormal; Notable for the following components:    CO2 22 (*)     BUN 4 (*)     Calcium 10.7 (*)     Total Bilirubin 1.8 (*)     ALT 8 (*)     All other components within normal limits   URINALYSIS, REFLEX TO URINE CULTURE - Abnormal; Notable for the following components:    Appearance, UA Hazy (*)     Leukocytes, UA Trace (*)     All other components within normal limits    Narrative:     Specimen Source->Urine   D DIMER, QUANTITATIVE - Abnormal; Notable for the following components:    D-Dimer 0.81 (*)     All other components within normal limits   LIPASE   MAGNESIUM   TROPONIN I   B-TYPE NATRIURETIC PEPTIDE   URINALYSIS MICROSCOPIC    Narrative:     Specimen Source->Urine   TROPONIN I     EKG Readings: (Independently Interpreted)   Initial Reading: No STEMI. Rhythm: Sinus Tachycardia. Heart Rate: 111. Ectopy: No Ectopy. Conduction: Normal. Other Impression: T-wave inversion, TIII in AVF.       Imaging Results              US Abdomen Limited (Final result)  Result time 04/03/24 21:29:29      Final result by Joshua Fuentes MD (04/03/24 21:29:29)                   Impression:      No significant abnormalities identified.      Electronically signed by: Joshua  MD Gina  Date:    04/03/2024  Time:    21:29               Narrative:    EXAMINATION:  US ABDOMEN LIMITED    CLINICAL HISTORY:  epigastric pain;    TECHNIQUE:  Limited ultrasound of the right upper quadrant of the abdomen (including pancreas, liver, gallbladder, common bile duct, and spleen) was performed.    COMPARISON:  None.    FINDINGS:  The liver is normal in size measuring 14cm.  Hepatic parenchyma is homogeneous without evidence for masses.  No intra- or extrahepatic biliary ductal dilatation. The common bile duct measures 0.3 cm.  The gallbladder appears normal. No evidence for cholelithiasis.  Sonographic Ray's sign is negative. The visualized portion of the pancreas appears normal.  Visualized portions of the IVC appear normal. The spleen is normal in size measuring 7 cm. No ascites.                                       CTA Chest Non-Coronary (PE Studies) (Final result)  Result time 04/03/24 17:17:47      Final result by Joshua Fuentes MD (04/03/24 17:17:47)                   Impression:      No evidence of PE.  No acute intrathoracic abnormalities identified.      Electronically signed by: Joshua Fuentes MD  Date:    04/03/2024  Time:    17:17               Narrative:    EXAMINATION:  CTA CHEST NON CORONARY (PE STUDIES)    CLINICAL HISTORY:  Pulmonary embolism (PE) suspected, high prob;    TECHNIQUE:  Low dose axial images, sagittal and coronal reformations were obtained from the thoracic inlet to the lung bases following the IV administration of 75 mL of Omnipaque 350.  Contrast timing was optimized to evaluate the pulmonary arteries.  MIP images were performed.    COMPARISON:  None    FINDINGS:  Structures at the base of the neck are unremarkable.  No evidence of aortic aneurysm or dissection.  The heart is normal in size without pericardial effusion.  No intraluminal filling defects within the pulmonary arteries to suggest pulmonary thromboembolism.   There is no evidence of mediastinal,  axillary, or hilar lymph node enlargement.  The esophagus is unremarkable along its course.    The trachea and bronchi are patent.  The lungs are symmetrically expanded.  Lungs show no consolidation, pleural effusion, pulmonary hemorrhage, or infarction.    The visualized abdominal structures are unremarkable.  No acute osseous abnormality identified.  Extrathoracic soft tissues are unremarkable.                                       X-Ray Chest PA And Lateral (Final result)  Result time 04/03/24 11:36:12      Final result by Kike Casey MD (04/03/24 11:36:12)                   Impression:      No acute process.      Electronically signed by: Kike Casey MD  Date:    04/03/2024  Time:    11:36               Narrative:    EXAMINATION:  XR CHEST PA AND LATERAL    CLINICAL HISTORY:  Shortness of breath    TECHNIQUE:  PA and lateral views of the chest were performed.    COMPARISON:  03/30/2024.    FINDINGS:  The trachea is unremarkable.  The cardiomediastinal silhouette is within normal limits.  The hilar structures are unremarkable.  There is no evidence of free air beneath hemidiaphragms.  There are no pleural effusions.  There is no evidence of a pneumothorax.  There is no evidence of pneumomediastinum.  No airspace opacity is present.  The osseous structures are unremarkable.                                       Medications   sodium chloride 0.9% flush 10 mL (has no administration in time range)   naloxone 0.4 mg/mL injection 0.02 mg (has no administration in time range)   glucose chewable tablet 16 g (has no administration in time range)   glucose chewable tablet 24 g (has no administration in time range)   dextrose 50% injection 12.5 g (has no administration in time range)   dextrose 50% injection 25 g (has no administration in time range)   glucagon (human recombinant) injection 1 mg (has no administration in time range)   acetaminophen tablet 650 mg (has no administration in time range)   ondansetron  injection 4 mg (has no administration in time range)   melatonin tablet 6 mg (has no administration in time range)   pantoprazole injection 40 mg (40 mg Intravenous Given 4/3/24 1335)   morphine injection 2 mg (2 mg Intravenous Given 4/3/24 1450)   iohexoL (OMNIPAQUE 350) injection 75 mL (75 mLs Intravenous Given 4/3/24 1647)   acetaminophen tablet 1,000 mg (1,000 mg Oral Given 4/3/24 1823)   aluminum-magnesium hydroxide-simethicone 200-200-20 mg/5 mL suspension 30 mL (30 mLs Oral Given 4/3/24 2122)     And   LIDOcaine viscous HCl 2% oral solution 15 mL (15 mLs Oral Given 4/3/24 2122)   sodium chloride 0.9% bolus 1,000 mL 1,000 mL (0 mLs Intravenous Stopped 4/3/24 2242)   morphine injection 2 mg (2 mg Intravenous Given 4/3/24 2128)   0.9%  NaCl infusion (1,000 mLs Intravenous New Bag 4/3/24 2216)     Medical Decision Making  69 yo with hx of hypertension, hyperlipidemia, reflux, atypical chest pain in the past, here with 2 weeks of gradual worsening epigastric and lower chest pain, radiating to her back with any liquid or food intake, becoming unbearable in the last 2 days.  She states she is unable to eat or drink anything due to the pain.  Differential diagnosis is broad and includes but is not limited to esophageal, gastric, cardiac, pulmonary in other etiologies.  Patient also states that she lies down after taking her evening medications, increasing the risk of esophagitis, esophageal ulcer, gastric ulcers no other irritation in that area.  EKG with sinus tachycardia otherwise acutely reassuring.  Lab work including CBC, CMP, troponin x2 noted, acutely reassuring other than a mildly elevated T bili.  D-dimer elevated.  CTA performed, negative for PE.  Patient has been given multiple medications in the ER including morphine x2, Protonix, Tylenol, GI cocktail.  She reports little to no relief with any of the medications. Most relief came with the gi cocktail but was fleeting.   Given her intractable pain, I will  place her in observation for further monitoring and care.  She has been given a normal saline bolus and I will continue normal saline infusion for hydration.  I have discussed the plan with patient and with Hospital Medicine who will assume care.    Amount and/or Complexity of Data Reviewed  Labs: ordered.  Radiology: ordered.  ECG/medicine tests: ordered and independent interpretation performed.    Risk  OTC drugs.  Prescription drug management.            Scribe Attestation:   Scribe #1: I performed the above scribed service and the documentation accurately describes the services I performed. I attest to the accuracy of the note.                               Clinical Impression:  Final diagnoses:  [R07.9] Chest pain  [R10.9] Abdominal pain  [R52] Intractable pain (Primary)          ED Disposition Condition    Observation Stable             I, Sathish Baeza MD, personally performed the services described in this documentation. All medical record entries made by the scribe were at my direction and in my presence.  I have reviewed the chart and agree that the record reflects my personal performance and is accurate and complete.      Sathish Baeza MD  04/03/24 [id number]

## 2024-04-04 ENCOUNTER — ANESTHESIA (OUTPATIENT)
Dept: ENDOSCOPY | Facility: HOSPITAL | Age: 69
End: 2024-04-04
Payer: MEDICARE

## 2024-04-04 ENCOUNTER — ANESTHESIA EVENT (OUTPATIENT)
Dept: ENDOSCOPY | Facility: HOSPITAL | Age: 69
End: 2024-04-04
Payer: MEDICARE

## 2024-04-04 PROBLEM — R13.10 DYSPHAGIA: Status: ACTIVE | Noted: 2024-04-04

## 2024-04-04 LAB
ALBUMIN SERPL BCP-MCNC: 3.3 G/DL (ref 3.5–5.2)
ALP SERPL-CCNC: 49 U/L (ref 55–135)
ALT SERPL W/O P-5'-P-CCNC: <5 U/L (ref 10–44)
ANION GAP SERPL CALC-SCNC: 7 MMOL/L (ref 8–16)
AST SERPL-CCNC: 13 U/L (ref 10–40)
BASOPHILS # BLD AUTO: 0.02 K/UL (ref 0–0.2)
BASOPHILS NFR BLD: 0.3 % (ref 0–1.9)
BILIRUB SERPL-MCNC: 1.4 MG/DL (ref 0.1–1)
BUN SERPL-MCNC: 5 MG/DL (ref 8–23)
CALCIUM SERPL-MCNC: 8.7 MG/DL (ref 8.7–10.5)
CHLORIDE SERPL-SCNC: 111 MMOL/L (ref 95–110)
CO2 SERPL-SCNC: 21 MMOL/L (ref 23–29)
CREAT SERPL-MCNC: 0.8 MG/DL (ref 0.5–1.4)
DIFFERENTIAL METHOD BLD: NORMAL
EOSINOPHIL # BLD AUTO: 0 K/UL (ref 0–0.5)
EOSINOPHIL NFR BLD: 0.3 % (ref 0–8)
ERYTHROCYTE [DISTWIDTH] IN BLOOD BY AUTOMATED COUNT: 14.5 % (ref 11.5–14.5)
EST. GFR  (NO RACE VARIABLE): >60 ML/MIN/1.73 M^2
GLUCOSE SERPL-MCNC: 89 MG/DL (ref 70–110)
HCT VFR BLD AUTO: 37.8 % (ref 37–48.5)
HGB BLD-MCNC: 12.1 G/DL (ref 12–16)
IMM GRANULOCYTES # BLD AUTO: 0.01 K/UL (ref 0–0.04)
IMM GRANULOCYTES NFR BLD AUTO: 0.2 % (ref 0–0.5)
LYMPHOCYTES # BLD AUTO: 2.1 K/UL (ref 1–4.8)
LYMPHOCYTES NFR BLD: 34.1 % (ref 18–48)
MAGNESIUM SERPL-MCNC: 2.2 MG/DL (ref 1.6–2.6)
MCH RBC QN AUTO: 27.5 PG (ref 27–31)
MCHC RBC AUTO-ENTMCNC: 32 G/DL (ref 32–36)
MCV RBC AUTO: 86 FL (ref 82–98)
MONOCYTES # BLD AUTO: 0.5 K/UL (ref 0.3–1)
MONOCYTES NFR BLD: 7.7 % (ref 4–15)
NEUTROPHILS # BLD AUTO: 3.6 K/UL (ref 1.8–7.7)
NEUTROPHILS NFR BLD: 57.4 % (ref 38–73)
NRBC BLD-RTO: 0 /100 WBC
OHS QRS DURATION: 74 MS
OHS QTC CALCULATION: 418 MS
PHOSPHATE SERPL-MCNC: 3.6 MG/DL (ref 2.7–4.5)
PLATELET # BLD AUTO: 223 K/UL (ref 150–450)
PMV BLD AUTO: 9.9 FL (ref 9.2–12.9)
POTASSIUM SERPL-SCNC: 3.5 MMOL/L (ref 3.5–5.1)
PROT SERPL-MCNC: 5.7 G/DL (ref 6–8.4)
RBC # BLD AUTO: 4.4 M/UL (ref 4–5.4)
SODIUM SERPL-SCNC: 139 MMOL/L (ref 136–145)
WBC # BLD AUTO: 6.27 K/UL (ref 3.9–12.7)

## 2024-04-04 PROCEDURE — 63600175 PHARM REV CODE 636 W HCPCS: Performed by: NURSE PRACTITIONER

## 2024-04-04 PROCEDURE — 25500020 PHARM REV CODE 255: Performed by: STUDENT IN AN ORGANIZED HEALTH CARE EDUCATION/TRAINING PROGRAM

## 2024-04-04 PROCEDURE — G0378 HOSPITAL OBSERVATION PER HR: HCPCS

## 2024-04-04 PROCEDURE — 11000001 HC ACUTE MED/SURG PRIVATE ROOM

## 2024-04-04 PROCEDURE — 25000003 PHARM REV CODE 250

## 2024-04-04 PROCEDURE — 99223 1ST HOSP IP/OBS HIGH 75: CPT | Mod: 25,,, | Performed by: NURSE PRACTITIONER

## 2024-04-04 PROCEDURE — 63600175 PHARM REV CODE 636 W HCPCS: Performed by: NURSE ANESTHETIST, CERTIFIED REGISTERED

## 2024-04-04 PROCEDURE — 85025 COMPLETE CBC W/AUTO DIFF WBC: CPT

## 2024-04-04 PROCEDURE — 84100 ASSAY OF PHOSPHORUS: CPT

## 2024-04-04 PROCEDURE — 25000003 PHARM REV CODE 250: Performed by: NURSE PRACTITIONER

## 2024-04-04 PROCEDURE — 25000003 PHARM REV CODE 250: Performed by: NURSE ANESTHETIST, CERTIFIED REGISTERED

## 2024-04-04 PROCEDURE — 43239 EGD BIOPSY SINGLE/MULTIPLE: CPT | Performed by: INTERNAL MEDICINE

## 2024-04-04 PROCEDURE — 43239 EGD BIOPSY SINGLE/MULTIPLE: CPT | Mod: ,,, | Performed by: INTERNAL MEDICINE

## 2024-04-04 PROCEDURE — 25000003 PHARM REV CODE 250: Performed by: PHYSICIAN ASSISTANT

## 2024-04-04 PROCEDURE — 83735 ASSAY OF MAGNESIUM: CPT

## 2024-04-04 PROCEDURE — C9113 INJ PANTOPRAZOLE SODIUM, VIA: HCPCS | Performed by: NURSE PRACTITIONER

## 2024-04-04 PROCEDURE — 27201012 HC FORCEPS, HOT/COLD, DISP: Performed by: INTERNAL MEDICINE

## 2024-04-04 PROCEDURE — 88305 TISSUE EXAM BY PATHOLOGIST: CPT | Mod: 26,,, | Performed by: PATHOLOGY

## 2024-04-04 PROCEDURE — 80053 COMPREHEN METABOLIC PANEL: CPT

## 2024-04-04 PROCEDURE — 88305 TISSUE EXAM BY PATHOLOGIST: CPT | Performed by: PATHOLOGY

## 2024-04-04 PROCEDURE — 88342 IMHCHEM/IMCYTCHM 1ST ANTB: CPT | Performed by: PATHOLOGY

## 2024-04-04 PROCEDURE — 37000009 HC ANESTHESIA EA ADD 15 MINS: Performed by: INTERNAL MEDICINE

## 2024-04-04 PROCEDURE — 25000003 PHARM REV CODE 250: Performed by: ANESTHESIOLOGY

## 2024-04-04 PROCEDURE — 37000008 HC ANESTHESIA 1ST 15 MINUTES: Performed by: INTERNAL MEDICINE

## 2024-04-04 PROCEDURE — D9220A PRA ANESTHESIA: Mod: ,,, | Performed by: NURSE ANESTHETIST, CERTIFIED REGISTERED

## 2024-04-04 RX ORDER — PROPOFOL 10 MG/ML
VIAL (ML) INTRAVENOUS
Status: DISPENSED
Start: 2024-04-04 | End: 2024-04-05

## 2024-04-04 RX ORDER — QUETIAPINE FUMARATE 50 MG/1
50 TABLET, FILM COATED ORAL NIGHTLY
COMMUNITY
Start: 2024-03-29

## 2024-04-04 RX ORDER — ATORVASTATIN CALCIUM 40 MG/1
40 TABLET, FILM COATED ORAL DAILY
Status: DISCONTINUED | OUTPATIENT
Start: 2024-04-04 | End: 2024-04-05 | Stop reason: HOSPADM

## 2024-04-04 RX ORDER — LIDOCAINE HYDROCHLORIDE 10 MG/ML
INJECTION, SOLUTION EPIDURAL; INFILTRATION; INTRACAUDAL; PERINEURAL
Status: DISPENSED
Start: 2024-04-04 | End: 2024-04-05

## 2024-04-04 RX ORDER — PANTOPRAZOLE SODIUM 40 MG/1
40 TABLET, DELAYED RELEASE ORAL 2 TIMES DAILY
Status: DISCONTINUED | OUTPATIENT
Start: 2024-04-04 | End: 2024-04-04

## 2024-04-04 RX ORDER — QUETIAPINE FUMARATE 25 MG/1
50 TABLET, FILM COATED ORAL ONCE
Status: COMPLETED | OUTPATIENT
Start: 2024-04-04 | End: 2024-04-04

## 2024-04-04 RX ORDER — FAMOTIDINE 10 MG/ML
INJECTION INTRAVENOUS
Status: DISCONTINUED | OUTPATIENT
Start: 2024-04-04 | End: 2024-04-04

## 2024-04-04 RX ORDER — PANTOPRAZOLE SODIUM 40 MG/10ML
40 INJECTION, POWDER, LYOPHILIZED, FOR SOLUTION INTRAVENOUS 2 TIMES DAILY
Status: DISCONTINUED | OUTPATIENT
Start: 2024-04-04 | End: 2024-04-05

## 2024-04-04 RX ORDER — QUETIAPINE FUMARATE 25 MG/1
50 TABLET, FILM COATED ORAL NIGHTLY
Status: DISCONTINUED | OUTPATIENT
Start: 2024-04-04 | End: 2024-04-05 | Stop reason: HOSPADM

## 2024-04-04 RX ORDER — SIMETHICONE 80 MG
1 TABLET,CHEWABLE ORAL 3 TIMES DAILY PRN
Status: DISCONTINUED | OUTPATIENT
Start: 2024-04-04 | End: 2024-04-05

## 2024-04-04 RX ORDER — LIDOCAINE HYDROCHLORIDE 20 MG/ML
INJECTION INTRAVENOUS
Status: DISCONTINUED | OUTPATIENT
Start: 2024-04-04 | End: 2024-04-04

## 2024-04-04 RX ORDER — FAMOTIDINE 20 MG/50ML
INJECTION, SOLUTION INTRAVENOUS
Status: DISPENSED
Start: 2024-04-04 | End: 2024-04-05

## 2024-04-04 RX ORDER — CITALOPRAM 40 MG/1
40 TABLET, FILM COATED ORAL
COMMUNITY
Start: 2024-03-04

## 2024-04-04 RX ORDER — AMLODIPINE BESYLATE 5 MG/1
10 TABLET ORAL DAILY
Status: DISCONTINUED | OUTPATIENT
Start: 2024-04-04 | End: 2024-04-05 | Stop reason: HOSPADM

## 2024-04-04 RX ORDER — PROPOFOL 10 MG/ML
VIAL (ML) INTRAVENOUS
Status: DISCONTINUED | OUTPATIENT
Start: 2024-04-04 | End: 2024-04-04

## 2024-04-04 RX ORDER — CITALOPRAM 20 MG/1
40 TABLET, FILM COATED ORAL DAILY
Status: DISCONTINUED | OUTPATIENT
Start: 2024-04-04 | End: 2024-04-05 | Stop reason: HOSPADM

## 2024-04-04 RX ADMIN — QUETIAPINE 50 MG: 25 TABLET ORAL at 01:04

## 2024-04-04 RX ADMIN — FAMOTIDINE 20 MG: 10 INJECTION INTRAVENOUS at 02:04

## 2024-04-04 RX ADMIN — IOHEXOL 75 ML: 350 INJECTION, SOLUTION INTRAVENOUS at 08:04

## 2024-04-04 RX ADMIN — CITALOPRAM HYDROBROMIDE 40 MG: 20 TABLET ORAL at 06:04

## 2024-04-04 RX ADMIN — LIDOCAINE HYDROCHLORIDE 50 MG: 20 INJECTION, SOLUTION INTRAVENOUS at 02:04

## 2024-04-04 RX ADMIN — QUETIAPINE FUMARATE 50 MG: 25 TABLET ORAL at 10:04

## 2024-04-04 RX ADMIN — PANTOPRAZOLE SODIUM 40 MG: 40 INJECTION, POWDER, FOR SOLUTION INTRAVENOUS at 10:04

## 2024-04-04 RX ADMIN — PANTOPRAZOLE SODIUM 40 MG: 40 TABLET, DELAYED RELEASE ORAL at 08:04

## 2024-04-04 RX ADMIN — ACETAMINOPHEN 650 MG: 325 TABLET ORAL at 06:04

## 2024-04-04 RX ADMIN — SODIUM CHLORIDE: 0.9 INJECTION, SOLUTION INTRAVENOUS at 02:04

## 2024-04-04 RX ADMIN — PROPOFOL 100 MG: 10 INJECTION, EMULSION INTRAVENOUS at 02:04

## 2024-04-04 RX ADMIN — SIMETHICONE 80 MG: 80 TABLET, CHEWABLE ORAL at 06:04

## 2024-04-04 RX ADMIN — AMLODIPINE BESYLATE 10 MG: 5 TABLET ORAL at 08:04

## 2024-04-04 RX ADMIN — ATORVASTATIN CALCIUM 40 MG: 40 TABLET, FILM COATED ORAL at 08:04

## 2024-04-04 NOTE — SUBJECTIVE & OBJECTIVE
Past Medical History:   Diagnosis Date    Chronic pain     left arm    Hyperlipidemia     Hypertension     Stroke     2019       Past Surgical History:   Procedure Laterality Date    BUNIONECTOMY      LEG TENDON SURGERY Right        Review of patient's allergies indicates:   Allergen Reactions    Benadryl [diphenhydramine hcl]      cuase her bp to elevate    Prednisone Other (See Comments)     Reactions:  Increased blood pressure    Asa-calcium carb-mag-aluminum Other (See Comments)     Acid reflux  Acid reflux      Aspirin Other (See Comments)    Ibuprofen Other (See Comments)     Hx of stroke per patient. Patient  states that she cannot take.    Penicillins     Powder base no.200 Rash     Powder in gloves causes rash on hands, Denies any reaction to latex products       Current Facility-Administered Medications on File Prior to Encounter   Medication    acetaminophen tablet 650 mg    EPINEPHrine (EPIPEN) 0.3 mg/0.3 mL pen injection 0.3 mg     Current Outpatient Medications on File Prior to Encounter   Medication Sig    amLODIPine (NORVASC) 10 MG tablet Take 10 mg by mouth once daily.    atorvastatin (LIPITOR) 40 MG tablet Take 40 mg by mouth once daily.    benzonatate (TESSALON) 100 MG capsule Take 100 mg by mouth.    cetirizine (ZYRTEC) 10 MG tablet Take 10 mg by mouth once daily.    diclofenac sodium (VOLTAREN) 1 % Gel SMARTSI Gram(s) Topical 5 Times Daily PRN    esomeprazole (NEXIUM) 40 MG capsule Take 1 capsule (40 mg total) by mouth before breakfast.    famotidine (PEPCID) 40 MG tablet Take 1 tablet (40 mg total) by mouth once daily.    fluticasone propionate (FLONASE) 50 mcg/actuation nasal spray 1 spray by Each Nostril route once daily.    hydrOXYzine pamoate (VISTARIL) 25 MG Cap Take 1 capsule (25 mg total) by mouth every 8 (eight) hours as needed.    LIDOcaine (LIDODERM) 5 % Place 1 patch onto the skin once daily. Remove & Discard patch within 12 hours or as directed by MD    loratadine (CLARITIN) 10  mg tablet Take 10 mg by mouth once daily.    LORazepam (ATIVAN) 0.5 MG tablet Take 1 tablet (0.5 mg total) by mouth every 12 (twelve) hours as needed for Anxiety.    ondansetron (ZOFRAN) 4 MG tablet Take 1 tablet (4 mg total) by mouth every 6 (six) hours.    ondansetron (ZOFRAN-ODT) 4 MG TbDL Take 4 mg by mouth 3 (three) times daily.    pantoprazole (PROTONIX) 40 MG tablet Please take 1 tablet by mouth every 12 hours while you have abdominal discomfort, then take 1 tablet every day.    potassium bicarbonate (K-LYTE) disintegrating tablet Take 1 tablet (25 mEq total) by mouth once daily.    predniSONE (DELTASONE) 20 MG tablet Take 40 mg by mouth.    pregabalin (LYRICA) 100 MG capsule Take 1 capsule (100 mg total) by mouth 3 (three) times daily.    tiZANidine (ZANAFLEX) 4 MG tablet Take 4 mg by mouth every 6 (six) hours as needed.    traMADoL (ULTRAM) 50 mg tablet Take 50 mg by mouth every 8 (eight) hours as needed.    traZODone (DESYREL) 100 MG tablet Take 100 mg by mouth every evening.    venlafaxine (EFFEXOR-XR) 37.5 MG 24 hr capsule Take 1 capsule (37.5 mg total) by mouth once daily.    [DISCONTINUED] aspirin (ECOTRIN) 81 MG EC tablet Take 1 tablet (81 mg total) by mouth once daily.    [DISCONTINUED] folic acid (FOLVITE) 1 MG tablet Take 1 mg by mouth once daily.     Family History       Problem Relation (Age of Onset)    Cancer Mother    No Known Problems Father    Ovarian cancer Sister          Tobacco Use    Smoking status: Never    Smokeless tobacco: Never   Substance and Sexual Activity    Alcohol use: Not Currently     Comment: quit 10/19    Drug use: No    Sexual activity: Not Currently     Partners: Male     Birth control/protection: None     Review of Systems   Constitutional: Negative.    HENT: Negative.     Respiratory: Negative.     Cardiovascular:  Positive for chest pain.        Lower midsternal region   Gastrointestinal:  Positive for abdominal pain.        Epigastric abdominal pain radiating to the  back, indigestion, feeling of food stuck in chest   Genitourinary: Negative.    Musculoskeletal: Negative.    Skin: Negative.    Neurological:  Positive for numbness.        Left hand numbness (history of CVA)   Psychiatric/Behavioral: Negative.       Objective:     Vital Signs (Most Recent):  Temp: 98 °F (36.7 °C) (04/04/24 0103)  Pulse: 72 (04/04/24 0103)  Resp: 19 (04/04/24 0103)  BP: 131/77 (04/04/24 0102)  SpO2: 97 % (04/04/24 0103) Vital Signs (24h Range):  Temp:  [97.8 °F (36.6 °C)-98.8 °F (37.1 °C)] 98 °F (36.7 °C)  Pulse:  [] 72  Resp:  [10-20] 19  SpO2:  [96 %-100 %] 97 %  BP: (110-155)/(74-91) 131/77     Weight: 63.5 kg (140 lb)  Body mass index is 26.45 kg/m².     Physical Exam  Constitutional:       General: She is not in acute distress.     Appearance: Normal appearance. She is not ill-appearing or diaphoretic.   HENT:      Head: Normocephalic and atraumatic.      Mouth/Throat:      Mouth: Mucous membranes are moist.   Eyes:      Extraocular Movements: Extraocular movements intact.   Cardiovascular:      Rate and Rhythm: Normal rate.      Pulses: Normal pulses.   Pulmonary:      Effort: Pulmonary effort is normal. No respiratory distress.      Comments: Speaking in full sentences on RA  Abdominal:      General: Abdomen is flat.      Palpations: Abdomen is soft.      Tenderness: There is no abdominal tenderness. There is no guarding or rebound.   Musculoskeletal:      Right lower leg: No edema.      Left lower leg: No edema.   Skin:     General: Skin is warm.   Neurological:      General: No focal deficit present.      Mental Status: She is alert and oriented to person, place, and time. Mental status is at baseline.   Psychiatric:         Mood and Affect: Mood normal.         Behavior: Behavior normal.         Thought Content: Thought content normal.                Significant Labs: All pertinent labs within the past 24 hours have been reviewed.    Significant Imaging: I have reviewed all  pertinent imaging results/findings within the past 24 hours.  Imaging Results              US Abdomen Limited (Final result)  Result time 04/03/24 21:29:29      Final result by Joshua Fuentes MD (04/03/24 21:29:29)                   Impression:      No significant abnormalities identified.      Electronically signed by: Joshua Fuentes MD  Date:    04/03/2024  Time:    21:29               Narrative:    EXAMINATION:  US ABDOMEN LIMITED    CLINICAL HISTORY:  epigastric pain;    TECHNIQUE:  Limited ultrasound of the right upper quadrant of the abdomen (including pancreas, liver, gallbladder, common bile duct, and spleen) was performed.    COMPARISON:  None.    FINDINGS:  The liver is normal in size measuring 14cm.  Hepatic parenchyma is homogeneous without evidence for masses.  No intra- or extrahepatic biliary ductal dilatation. The common bile duct measures 0.3 cm.  The gallbladder appears normal. No evidence for cholelithiasis.  Sonographic Ray's sign is negative. The visualized portion of the pancreas appears normal.  Visualized portions of the IVC appear normal. The spleen is normal in size measuring 7 cm. No ascites.                                       CTA Chest Non-Coronary (PE Studies) (Final result)  Result time 04/03/24 17:17:47      Final result by Joshua Fuentes MD (04/03/24 17:17:47)                   Impression:      No evidence of PE.  No acute intrathoracic abnormalities identified.      Electronically signed by: Joshua Fuentes MD  Date:    04/03/2024  Time:    17:17               Narrative:    EXAMINATION:  CTA CHEST NON CORONARY (PE STUDIES)    CLINICAL HISTORY:  Pulmonary embolism (PE) suspected, high prob;    TECHNIQUE:  Low dose axial images, sagittal and coronal reformations were obtained from the thoracic inlet to the lung bases following the IV administration of 75 mL of Omnipaque 350.  Contrast timing was optimized to evaluate the pulmonary arteries.  MIP images were  performed.    COMPARISON:  None    FINDINGS:  Structures at the base of the neck are unremarkable.  No evidence of aortic aneurysm or dissection.  The heart is normal in size without pericardial effusion.  No intraluminal filling defects within the pulmonary arteries to suggest pulmonary thromboembolism.   There is no evidence of mediastinal, axillary, or hilar lymph node enlargement.  The esophagus is unremarkable along its course.    The trachea and bronchi are patent.  The lungs are symmetrically expanded.  Lungs show no consolidation, pleural effusion, pulmonary hemorrhage, or infarction.    The visualized abdominal structures are unremarkable.  No acute osseous abnormality identified.  Extrathoracic soft tissues are unremarkable.                                       X-Ray Chest PA And Lateral (Final result)  Result time 04/03/24 11:36:12      Final result by Kike Casey MD (04/03/24 11:36:12)                   Impression:      No acute process.      Electronically signed by: Kike Casey MD  Date:    04/03/2024  Time:    11:36               Narrative:    EXAMINATION:  XR CHEST PA AND LATERAL    CLINICAL HISTORY:  Shortness of breath    TECHNIQUE:  PA and lateral views of the chest were performed.    COMPARISON:  03/30/2024.    FINDINGS:  The trachea is unremarkable.  The cardiomediastinal silhouette is within normal limits.  The hilar structures are unremarkable.  There is no evidence of free air beneath hemidiaphragms.  There are no pleural effusions.  There is no evidence of a pneumothorax.  There is no evidence of pneumomediastinum.  No airspace opacity is present.  The osseous structures are unremarkable.

## 2024-04-04 NOTE — ASSESSMENT & PLAN NOTE
History noted, history of CVA in 2020 and patient with left upper extremity weakness and numbness.

## 2024-04-04 NOTE — ASSESSMENT & PLAN NOTE
-Patient presents with epigastric abdominal pain/midsternal chest pain.  Likely esophagitis versus esophageal ulcer versus stricture? Low suspicion of cardiac origin with normal troponin and EKG without ischemic changes.  -Trend troponin  -Continue to monitor

## 2024-04-04 NOTE — H&P (VIEW-ONLY)
[facility] Consultation Note    Patient Complaint: epigastric pain    PCP:   Yvon Good       LOS: 0        Initial History of Present Illness (HPI):  This is a 68 y.o. female consulted to GI service for intractable epigastric pain. PMH hypertension, hyperlipidemia, GERD, anxiety, and history of stroke.  Patient complaint of acute worsening of epigastric pain with sensation of epigastric pressure and food feeling stuck that began intermittently 2 weeks ago. She reports pain has improved in ED but pressure sensation remain. Denies hematemesis, inability to swallow, choking, diarrhea or constipation. Denies ever having endoscopy. Reports visiting ED earlier in the week and dx with GERD and rx pepcid and nexium, to which she reports mild improvement. Denies smoking, drinking.      Medical History:  has a past medical history of Chronic pain, Hyperlipidemia, Hypertension, and Stroke.    Surgical History:  has a past surgical history that includes Leg Tendon Surgery (Right) and Bunionectomy.      Objective Findings:    Vital Signs:  Temp:  [97.9 °F (36.6 °C)-98.8 °F (37.1 °C)]   Pulse:  [68-99]   Resp:  [10-20]   BP: (110-155)/(67-91)   SpO2:  [95 %-99 %]   Body mass index is 26.45 kg/m².      Physical Exam  Vitals and nursing note reviewed.   Constitutional:       Appearance: Normal appearance.   HENT:      Head: Normocephalic.   Pulmonary:      Effort: Pulmonary effort is normal.   Abdominal:      General: Bowel sounds are normal.      Palpations: Abdomen is soft.   Skin:     General: Skin is warm and dry.   Neurological:      Mental Status: She is alert and oriented to person, place, and time.   Psychiatric:         Mood and Affect: Mood normal.         Behavior: Behavior normal.         Thought Content: Thought content normal.         Judgment: Judgment normal.               Labs:  Lab Results   Component Value Date    WBC 6.27 04/04/2024    HGB 12.1 04/04/2024    HCT 37.8 04/04/2024    PLT  223 2024    CHOL 159 02/10/2020    TRIG 120 02/10/2020    HDL 45 02/10/2020    ALT <5 (L) 2024    AST 13 2024     2024    K 3.5 2024     (H) 2024    CREATININE 0.8 2024    BUN 5 (L) 2024    CO2 21 (L) 2024    TSH 2.032 2024    INR 1.1 2021    HGBA1C 5.4 2016             Imagin/3 US abdomen- The liver is normal in size measuring 14cm. Hepatic parenchyma is homogeneous without evidence for masses. No intra- or extrahepatic biliary ductal dilatation. The common bile duct measures 0.3 cm. The gallbladder appears normal. No evidence for cholelithiasis. Visualized portion of the pancreas is normal. No evidence for cholelithiasis.      Endoscopy:     I have independently reviewed and interpreted the imaging above           Epigastric pain. Nausea and vomiting. Dysphagia.  Plan/ Recommendations:  1.  Second visit to the ED with similar complaint of epigastric pain and pressure. Agree with ppi, rec transition to iv bid. Remain npo. Plan for egd today.         Thank you so much for allowing us to participate in the care of Grazyna Durand . Please contact us if you have any additional questions.    Ysabel Cortes NP  Gastroenterology  [facility]

## 2024-04-04 NOTE — [employer]ATION PATIENT INSTRUCTIONS
Discharge Summary/Instructions after an Endoscopic Procedure  Patient Name: Grazyna Durand  Patient MRN: [id number]  Patient YOB: 1955 Thursday, April 4, 2024  Sudarshan Murdock MD  Dear patient,  As a result of recent federal legislation (The Federal Cures Act), you may   receive lab or pathology results from your procedure in your [facility]   account before your physician is able to contact you. Your physician or   their representative will relay the results to you with their   recommendations at their soonest availability.  Thank you,  RESTRICTIONS:  During your procedure today, you received medications for sedation.  These   medications may affect your judgment, balance and coordination.  Therefore,   for 24 hours, you have the following restrictions:   - DO NOT drive a car, operate machinery, make legal/financial decisions,   sign important papers or drink alcohol.    ACTIVITY:  Today: no heavy lifting, straining or running due to procedural   sedation/anesthesia.  The following day: return to full activity including work.  DIET:  Eat and drink normally unless instructed otherwise.     TREATMENT FOR COMMON SIDE EFFECTS:  - Mild abdominal pain, nausea, belching, bloating or excessive gas:  rest,   eat lightly and use a heating pad.  - Sore Throat: treat with throat lozenges and/or gargle with warm salt   water.  - Because air was used during the procedure, expelling large amounts of air   from your rectum or belching is normal.  - If a bowel prep was taken, you may not have a bowel movement for 1-3 days.    This is normal.  SYMPTOMS TO WATCH FOR AND REPORT TO YOUR PHYSICIAN:  1. Abdominal pain or bloating, other than gas cramps.  2. Chest pain.  3. Back pain.  4. Signs of infection such as: chills or fever occurring within 24 hours   after the procedure.  5. Rectal bleeding, which would show as bright red, maroon, or black stools.   (A tablespoon of blood from the rectum is not serious, especially if    hemorrhoids are present.)  6. Vomiting.  7. Weakness or dizziness.  GO DIRECTLY TO THE NEAREST EMERGENCY ROOM IF YOU HAVE ANY OF THE FOLLOWING:      Difficulty breathing              Chills and/or fever over 101 F   Persistent vomiting and/or vomiting blood   Severe abdominal pain   Severe chest pain   Black, tarry stools   Bleeding- more than one tablespoon   Any other symptom or condition that you feel may need urgent attention  Your doctor recommends these additional instructions:  If any biopsies were taken, your doctors clinic will contact you in 1 to 2   weeks with any results.  - Return patient to hospital [facility] for ongoing care.   - Await pathology results.   - Perform a CT scan (computed tomography) of abdomen with contrast and   pelvis with contrast at appointment to be scheduled.   - The findings and recommendations were discussed with the designated   responsible adult.  For questions, problems or results please call your physician - Sudarshan Murdock MD at Work:  [phone].  [facility] can be reached at [phone]     IF A COMPLICATION OR EMERGENCY SITUATION ARISES AND YOU ARE UNABLE TO REACH   YOUR PHYSICIAN - GO DIRECTLY TO THE EMERGENCY ROOM.  Sudarshan Murdock MD  4/4/2024 3:11:32 PM  This report has been verified and signed electronically.  Dear patient,  As a result of recent federal legislation (The Federal Cures Act), you may   receive lab or pathology results from your procedure in your [facility]   account before your physician is able to contact you. Your physician or   their representative will relay the results to you with their   recommendations at their soonest availability.  Thank you,  [employer]ATION

## 2024-04-04 NOTE — NURSING
Arrived to room 432 via stretcher, VSS on RA, ambulated to bathroom, oriented to room, call light in reach, daughter at bedside.

## 2024-04-04 NOTE — ASSESSMENT & PLAN NOTE
-Patient presents with worsening epigastric abdominal pain for the past 2 weeks. Was recently here in the ED about 5 days ago and discharged home with Nexium and Pepcid. Patient states that those medications have not helped. Patient unable to tolerate p.o. and every time she attempts, she has pain in her epigastric region that radiates to the back.    -Chest x-ray and ultrasound abdomen with no acute findings.  -No history of endoscopy or colonoscopy however has appointment coming up with GI in a week    Plan:  -NPO  -Gentle IVF's  -Continue PPI  -P.r.n. antiemetics ordered  -GI consulted for intractable epigastric pain/dysphagia and would appreciate recommendations.

## 2024-04-04 NOTE — ASSESSMENT & PLAN NOTE
History noted, patient on PPI daily  Continue home medications and GI consulted for evaluation for dysphagia/epigastric pain

## 2024-04-04 NOTE — TRANSFER OF CARE
Anesthesia Transfer of Care Note    Patient: Grazyna Durand    Procedure(s) Performed: Procedure(s) (LRB):  EGD (ESOPHAGOGASTRODUODENOSCOPY) (N/A)    Patient location: GI    Anesthesia Type: general    Transport from OR: Transported from OR on room air with adequate spontaneous ventilation    Post pain: adequate analgesia    Post assessment: no apparent anesthetic complications and tolerated procedure well    Post vital signs: stable    Level of consciousness: awake    Nausea/Vomiting: no nausea/vomiting    Complications: none    Transfer of care protocol was followed      Last vitals: Visit Vitals  BP (!) 104/59   Pulse 81   Temp 36.6 °C (97.8 °F)   Resp 18   Wt 63.5 kg (140 lb)   SpO2 100%   Breastfeeding No   BMI 26.45 kg/m²

## 2024-04-04 NOTE — H&P
"  [facility]  [facility]  History & Physical    Patient Name: Grazyna Durand  MRN: [id number]  Patient Class: OP- Observation  Admission Date: 4/3/2024  Attending Physician: Zena Combs,*   Primary Care Provider: Yvon Good MD         Patient information was obtained from patient, past medical records, and ER records.     Subjective:     Principal Problem:Epigastric abdominal pain    Chief Complaint:   Chief Complaint   Patient presents with    Chest Pain     Pt reports chest pain, SOB, anxiety, and nausea since x 3 days.  Pt was seen here for Reflux stating she did take her meds as prescribed but its not getting any better.         HPI: Grazyna Durand is a 68 y.o. with a pmh of hypertension, hyperlipidemia, GERD, anxiety, and history of stroke presents to the hospital with complaints of worsening epigastric abdominal pain for the past 2 weeks. Patient was recently seen in the ED for similar complaints and was discharged home on Pepcid and Nexium. However patient states that her symptoms have not gotten any better. She states that her epigastric pain is radiating to her back and feels like a "ball". Patient unable to tolerate p.o. and states that she feels like food is stuck in her chest. She states that she is hungry but unable to eat. She also dysphagia liquids and states that every time she drinks, she has pain in the middle of her chest.  Patient states that she has never seen a GI specialist and has never done endoscopy or colonoscopy but has an appointment upcoming with GI.  Patient denies any other alleviating or exacerbating factors.  Patient denies headache, blurry vision, vomiting, shortness on breath, hematemesis, hematochezia, hematuria, melena, or any other associated symptoms.    In the ED:  Patient afebrile without leukocytosis, hemodynamically stable, CBC unremarkable, BUN 4, calcium 10.7, total bilirubin 1.8, BNP and troponin levels normal, UA with trace " leukocytes, D-dimer 0.81, chest x-ray with no acute process, CTA chest shows no evidence of PE or no acute intrathoracic abnormality, ultrasound abdomen shows no significant abnormalities with no biliary ductal dilation and normal-appearing gallbladder.  Patient given  acetaminophen 1000 mg, GI cocktail, morphine 2 mg IV x2, pantoprazole 40 mg IV,, and 1 L NS bolus in the ED.    Case discussed with ED provider and patient will be placed under observation for further medical management.    Past Medical History:   Diagnosis Date    Chronic pain     left arm    Hyperlipidemia     Hypertension     Stroke            Past Surgical History:   Procedure Laterality Date    BUNIONECTOMY      LEG TENDON SURGERY Right        Review of patient's allergies indicates:   Allergen Reactions    Benadryl [diphenhydramine hcl]      cuase her bp to elevate    Prednisone Other (See Comments)     Reactions:  Increased blood pressure    Asa-calcium carb-mag-aluminum Other (See Comments)     Acid reflux  Acid reflux      Aspirin Other (See Comments)    Ibuprofen Other (See Comments)     Hx of stroke per patient. Patient  states that she cannot take.    Penicillins     Powder base no.200 Rash     Powder in gloves causes rash on hands, Denies any reaction to latex products       Current Facility-Administered Medications on File Prior to Encounter   Medication    acetaminophen tablet 650 mg    EPINEPHrine (EPIPEN) 0.3 mg/0.3 mL pen injection 0.3 mg     Current Outpatient Medications on File Prior to Encounter   Medication Sig    amLODIPine (NORVASC) 10 MG tablet Take 10 mg by mouth once daily.    atorvastatin (LIPITOR) 40 MG tablet Take 40 mg by mouth once daily.    benzonatate (TESSALON) 100 MG capsule Take 100 mg by mouth.    cetirizine (ZYRTEC) 10 MG tablet Take 10 mg by mouth once daily.    diclofenac sodium (VOLTAREN) 1 % Gel SMARTSI Gram(s) Topical 5 Times Daily PRN    esomeprazole (NEXIUM) 40 MG capsule Take 1 capsule (40 mg  total) by mouth before breakfast.    famotidine (PEPCID) 40 MG tablet Take 1 tablet (40 mg total) by mouth once daily.    fluticasone propionate (FLONASE) 50 mcg/actuation nasal spray 1 spray by Each Nostril route once daily.    hydrOXYzine pamoate (VISTARIL) 25 MG Cap Take 1 capsule (25 mg total) by mouth every 8 (eight) hours as needed.    LIDOcaine (LIDODERM) 5 % Place 1 patch onto the skin once daily. Remove & Discard patch within 12 hours or as directed by MD    loratadine (CLARITIN) 10 mg tablet Take 10 mg by mouth once daily.    LORazepam (ATIVAN) 0.5 MG tablet Take 1 tablet (0.5 mg total) by mouth every 12 (twelve) hours as needed for Anxiety.    ondansetron (ZOFRAN) 4 MG tablet Take 1 tablet (4 mg total) by mouth every 6 (six) hours.    ondansetron (ZOFRAN-ODT) 4 MG TbDL Take 4 mg by mouth 3 (three) times daily.    pantoprazole (PROTONIX) 40 MG tablet Please take 1 tablet by mouth every 12 hours while you have abdominal discomfort, then take 1 tablet every day.    potassium bicarbonate (K-LYTE) disintegrating tablet Take 1 tablet (25 mEq total) by mouth once daily.    predniSONE (DELTASONE) 20 MG tablet Take 40 mg by mouth.    pregabalin (LYRICA) 100 MG capsule Take 1 capsule (100 mg total) by mouth 3 (three) times daily.    tiZANidine (ZANAFLEX) 4 MG tablet Take 4 mg by mouth every 6 (six) hours as needed.    traMADoL (ULTRAM) 50 mg tablet Take 50 mg by mouth every 8 (eight) hours as needed.    traZODone (DESYREL) 100 MG tablet Take 100 mg by mouth every evening.    venlafaxine (EFFEXOR-XR) 37.5 MG 24 hr capsule Take 1 capsule (37.5 mg total) by mouth once daily.    [DISCONTINUED] aspirin (ECOTRIN) 81 MG EC tablet Take 1 tablet (81 mg total) by mouth once daily.    [DISCONTINUED] folic acid (FOLVITE) 1 MG tablet Take 1 mg by mouth once daily.     Family History       Problem Relation (Age of Onset)    Cancer Mother    No Known Problems Father    Ovarian cancer Sister          Tobacco Use    Smoking status:  Never    Smokeless tobacco: Never   Substance and Sexual Activity    Alcohol use: Not Currently     Comment: quit 10/19    Drug use: No    Sexual activity: Not Currently     Partners: Male     Birth control/protection: None     Review of Systems   Constitutional: Negative.    HENT: Negative.     Respiratory: Negative.     Cardiovascular:  Positive for chest pain.        Lower midsternal region   Gastrointestinal:  Positive for abdominal pain.        Epigastric abdominal pain radiating to the back, indigestion, feeling of food stuck in chest   Genitourinary: Negative.    Musculoskeletal: Negative.    Skin: Negative.    Neurological:  Positive for numbness.        Left hand numbness (history of CVA)   Psychiatric/Behavioral: Negative.       Objective:     Vital Signs (Most Recent):  Temp: 98 °F (36.7 °C) (04/04/24 0103)  Pulse: 72 (04/04/24 0103)  Resp: 19 (04/04/24 0103)  BP: 131/77 (04/04/24 0102)  SpO2: 97 % (04/04/24 0103) Vital Signs (24h Range):  Temp:  [97.8 °F (36.6 °C)-98.8 °F (37.1 °C)] 98 °F (36.7 °C)  Pulse:  [] 72  Resp:  [10-20] 19  SpO2:  [96 %-100 %] 97 %  BP: (110-155)/(74-91) 131/77     Weight: 63.5 kg (140 lb)  Body mass index is 26.45 kg/m².     Physical Exam  Constitutional:       General: She is not in acute distress.     Appearance: Normal appearance. She is not ill-appearing or diaphoretic.   HENT:      Head: Normocephalic and atraumatic.      Mouth/Throat:      Mouth: Mucous membranes are moist.   Eyes:      Extraocular Movements: Extraocular movements intact.   Cardiovascular:      Rate and Rhythm: Normal rate.      Pulses: Normal pulses.   Pulmonary:      Effort: Pulmonary effort is normal. No respiratory distress.      Comments: Speaking in full sentences on RA  Abdominal:      General: Abdomen is flat.      Palpations: Abdomen is soft.      Tenderness: There is no abdominal tenderness. There is no guarding or rebound.   Musculoskeletal:      Right lower leg: No edema.      Left lower  leg: No edema.   Skin:     General: Skin is warm.   Neurological:      General: No focal deficit present.      Mental Status: She is alert and oriented to person, place, and time. Mental status is at baseline.   Psychiatric:         Mood and Affect: Mood normal.         Behavior: Behavior normal.         Thought Content: Thought content normal.                Significant Labs: All pertinent labs within the past 24 hours have been reviewed.    Significant Imaging: I have reviewed all pertinent imaging results/findings within the past 24 hours.  Imaging Results              US Abdomen Limited (Final result)  Result time 04/03/24 21:29:29      Final result by Joshua Fuentes MD (04/03/24 21:29:29)                   Impression:      No significant abnormalities identified.      Electronically signed by: Joshua Fuentes MD  Date:    04/03/2024  Time:    21:29               Narrative:    EXAMINATION:  US ABDOMEN LIMITED    CLINICAL HISTORY:  epigastric pain;    TECHNIQUE:  Limited ultrasound of the right upper quadrant of the abdomen (including pancreas, liver, gallbladder, common bile duct, and spleen) was performed.    COMPARISON:  None.    FINDINGS:  The liver is normal in size measuring 14cm.  Hepatic parenchyma is homogeneous without evidence for masses.  No intra- or extrahepatic biliary ductal dilatation. The common bile duct measures 0.3 cm.  The gallbladder appears normal. No evidence for cholelithiasis.  Sonographic Ray's sign is negative. The visualized portion of the pancreas appears normal.  Visualized portions of the IVC appear normal. The spleen is normal in size measuring 7 cm. No ascites.                                       CTA Chest Non-Coronary (PE Studies) (Final result)  Result time 04/03/24 17:17:47      Final result by Joshua Fuentes MD (04/03/24 17:17:47)                   Impression:      No evidence of PE.  No acute intrathoracic abnormalities identified.      Electronically signed  by: Joshua Fuentes MD  Date:    04/03/2024  Time:    17:17               Narrative:    EXAMINATION:  CTA CHEST NON CORONARY (PE STUDIES)    CLINICAL HISTORY:  Pulmonary embolism (PE) suspected, high prob;    TECHNIQUE:  Low dose axial images, sagittal and coronal reformations were obtained from the thoracic inlet to the lung bases following the IV administration of 75 mL of Omnipaque 350.  Contrast timing was optimized to evaluate the pulmonary arteries.  MIP images were performed.    COMPARISON:  None    FINDINGS:  Structures at the base of the neck are unremarkable.  No evidence of aortic aneurysm or dissection.  The heart is normal in size without pericardial effusion.  No intraluminal filling defects within the pulmonary arteries to suggest pulmonary thromboembolism.   There is no evidence of mediastinal, axillary, or hilar lymph node enlargement.  The esophagus is unremarkable along its course.    The trachea and bronchi are patent.  The lungs are symmetrically expanded.  Lungs show no consolidation, pleural effusion, pulmonary hemorrhage, or infarction.    The visualized abdominal structures are unremarkable.  No acute osseous abnormality identified.  Extrathoracic soft tissues are unremarkable.                                       X-Ray Chest PA And Lateral (Final result)  Result time 04/03/24 11:36:12      Final result by Kike Casey MD (04/03/24 11:36:12)                   Impression:      No acute process.      Electronically signed by: Kike Casey MD  Date:    04/03/2024  Time:    11:36               Narrative:    EXAMINATION:  XR CHEST PA AND LATERAL    CLINICAL HISTORY:  Shortness of breath    TECHNIQUE:  PA and lateral views of the chest were performed.    COMPARISON:  03/30/2024.    FINDINGS:  The trachea is unremarkable.  The cardiomediastinal silhouette is within normal limits.  The hilar structures are unremarkable.  There is no evidence of free air beneath hemidiaphragms.  There are no pleural  effusions.  There is no evidence of a pneumothorax.  There is no evidence of pneumomediastinum.  No airspace opacity is present.  The osseous structures are unremarkable.                                      Assessment/Plan:     * Epigastric abdominal pain  -Patient presents with worsening epigastric abdominal pain for the past 2 weeks. Was recently here in the ED about 5 days ago and discharged home with Nexium and Pepcid. Patient states that those medications have not helped. Patient unable to tolerate p.o. and every time she attempts, she has pain in her epigastric region that radiates to the back.    -Chest x-ray and ultrasound abdomen with no acute findings.  -No history of endoscopy or colonoscopy however has appointment coming up with GI in a week    Plan:  -NPO  -Gentle IVF's  -Continue PPI  -P.r.n. antiemetics ordered  -GI consulted for intractable epigastric pain/dysphagia and would appreciate recommendations.     Dysphagia  -See plan for epigastric abdominal pain    History of stroke  History noted, history of CVA in 2020 and patient with left upper extremity weakness and numbness.    Hyperlipidemia  Continue home statin therapy    Chest pain, atypical  -Patient presents with epigastric abdominal pain/midsternal chest pain.  Likely esophagitis versus esophageal ulcer versus stricture? Low suspicion of cardiac origin with normal troponin and EKG without ischemic changes.  -Trend troponin  -Continue to monitor    Gastroesophageal reflux disease without esophagitis  History noted, patient on PPI daily  Continue home medications and GI consulted for evaluation for dysphagia/epigastric pain    Essential hypertension  Chronic, controlled. Latest blood pressure and vitals reviewed-     Temp:  [97.8 °F (36.6 °C)-98.8 °F (37.1 °C)]   Pulse:  []   Resp:  [10-20]   BP: (110-155)/(74-91)   SpO2:  [96 %-100 %] .   Home meds for hypertension were reviewed and noted below.   Hypertension Medications                amLODIPine (NORVASC) 10 MG tablet Take 10 mg by mouth once daily.            While in the hospital, will manage blood pressure as follows; Continue home antihypertensive regimen    Will utilize p.r.n. blood pressure medication only if patient's blood pressure greater than 180/110 and she develops symptoms such as worsening chest pain or shortness of breath.      VTE Risk Mitigation (From admission, onward)           Ordered     IP VTE LOW RISK PATIENT  Once         04/03/24 2254     Place sequential compression device  Until discontinued         04/03/24 2254                       VTE: SCD  Code: Full  Diet: NPO  Dispo: pending GI evaluation    As clarification, on 4/3/2024, patient should be placed in hospital observation services under my care in collaboration with Zena Combs MD.      AdmissionCare    Guideline: Abdominal Pain, Undiagnosed, Observation    Based on the indications selected for the patient, the bed status of Observation was determined to be MET    The following indications were selected as present at the time of evaluation of the patient:      - Suspected condition requiring continued monitoring (eg, ectopic pregnancy, appendicitis, bowel ischemia)   - Pain that persists despite emergency department care    AdmissionCare documentation entered by: Karlie Bucio    [facility], 27th edition, Copyright © 2023 [facility] [employer], [facility] All Rights Reserved.  [id number]:00     Abdiaziz Gomes PA-C  Department of Hospital Medicine  [facility] [place] - Emergency Dept

## 2024-04-04 NOTE — ASSESSMENT & PLAN NOTE
Chronic, controlled. Latest blood pressure and vitals reviewed-     Temp:  [97.8 °F (36.6 °C)-98.8 °F (37.1 °C)]   Pulse:  []   Resp:  [10-20]   BP: (110-155)/(74-91)   SpO2:  [96 %-100 %] .   Home meds for hypertension were reviewed and noted below.   Hypertension Medications               amLODIPine (NORVASC) 10 MG tablet Take 10 mg by mouth once daily.            While in the hospital, will manage blood pressure as follows; Continue home antihypertensive regimen    Will utilize p.r.n. blood pressure medication only if patient's blood pressure greater than 180/110 and she develops symptoms such as worsening chest pain or shortness of breath.

## 2024-04-04 NOTE — ADMISSIONCARE
AdmissionCare    Guideline: Abdominal Pain, Undiagnosed, Observation    Based on the indications selected for the patient, the bed status of Observation was determined to be MET    The following indications were selected as present at the time of evaluation of the patient:      - Suspected condition requiring continued monitoring (eg, ectopic pregnancy, appendicitis, bowel ischemia)   - Pain that persists despite emergency department care    AdmissionCare documentation entered by: Karlie Bucio    [facility], 27th edition, Copyright © 2023 [facility], [facility] All Rights Reserved.  [id number]:00

## 2024-04-04 NOTE — PLAN OF CARE
Procedure and recovery complete. Pt awake and alert. MD at bedside, procedure findings and suggestions discussed. Procedure report given given, pt verbalized understanding of instructions. Transfer report given to RN, understanding verbalized. Pt wheeled back to room by transport, accompanied by granddaughter.

## 2024-04-04 NOTE — ANESTHESIA PREPROCEDURE EVALUATION
04/04/2024  Grazyna Durand is a 68 y.o., female.      Pre-op Assessment    I have reviewed the Patient Summary Reports.     I have reviewed the Nursing Notes.       Review of Systems  Anesthesia Hx:  No problems with previous Anesthesia             Denies Family Hx of Anesthesia complications.    Denies Personal Hx of Anesthesia complications.                    Social:  Non-Smoker, No Alcohol Use       Hematology/Oncology:  Hematology Normal   Oncology Normal                                   EENT/Dental:  EENT/Dental Normal           Cardiovascular:     Hypertension   Denies MI.      Denies Dysrhythmias.   Denies Angina.     hyperlipidemia                       Hypertension         Pulmonary:  Pulmonary Normal                       Renal/:  Renal/ Normal                 Hepatic/GI:     GERD      Gerd          Musculoskeletal:  Musculoskeletal Normal                Neurological:   CVA, residual symptoms Neuromuscular Disease,       Left side paresis               CVA - Cerebrovasular Accident               Neuromuscular Disease   Endocrine:  Endocrine Normal                Physical Exam  General: Well nourished, Cooperative, Alert and Oriented    Airway:  Mallampati: II   Mouth Opening: Normal  TM Distance: 4 - 6 cm  Tongue: Normal  Neck ROM: Normal ROM    Dental:  Partial Dentures  Partial dentures on top  Chest/Lungs:  Normal Respiratory Rate, Clear to auscultation    Heart:  Rate: Normal  Rhythm: Regular Rhythm      Wt Readings from Last 3 Encounters:   04/03/24 63.5 kg (140 lb)   03/30/24 64 kg (141 lb)   03/23/24 64 kg (141 lb)     Temp Readings from Last 3 Encounters:   04/04/24 36.6 °C (97.9 °F)   03/30/24 36.9 °C (98.5 °F)   03/23/24 37 °C (98.6 °F) (Oral)     BP Readings from Last 3 Encounters:   04/04/24 134/81   03/30/24 139/79   03/23/24 (!) 156/74     Pulse Readings from Last 3 Encounters:    04/04/24 70   03/30/24 77   03/23/24 62         Anesthesia Plan  Type of Anesthesia, risks & benefits discussed:    Anesthesia Type: Gen Natural Airway, MAC, Gen ETT  Intra-op Monitoring Plan: Standard ASA Monitors  Post Op Pain Control Plan: multimodal analgesia  Induction:  IV  Informed Consent: Informed consent signed with the Patient and all parties understand the risks and agree with anesthesia plan.  All questions answered.   ASA Score: 3  Day of Surgery Review of History & Physical: H&P Update referred to the surgeon/provider.    Ready For Surgery From Anesthesia Perspective.     .

## 2024-04-04 NOTE — HOSPITAL COURSE
Admission to the hospital for epigastric pain similar to previous visits to the ED. EGD showed normal esophagus stomach and examined duodenum.  GI recommended CT of the abdomen with and without contrast which showed no acute intra abdominal process.  Patient had a good bowel movement overnight and GI symptoms resolved.  Patient tolerated regular diet for breakfast.   Patient also had a episode of chest pain with left side numbness/blurred vision in both eyes. Denied headache or eye pain. Patient lifted left leg up but when asked to lift right leg, patient stated unable to lift left leg but realized soon  left leg was already lifted. Patient ambulated in room all day with no issues. Patient have chronic numbness on left side due to previous stroke. MRI brain no acute stroke. Was seen by Neurology and possible recrudescence.   EKG  Wtc 744. Repeat troponin negative, 2 D echo normal EF 55-60% , grade I DD and normal wall motion.   Spoke with Daughter Keith via phone (lives in [place]) who reported patient continues to grieve sister death and anxious. Continue celexa. Will will need close follow up PCP.   Patient is HDS for discharge home.

## 2024-04-04 NOTE — HPI
"Grazyna Durand is a 68 y.o. with a pmh of hypertension, hyperlipidemia, GERD, anxiety, and history of stroke presents to the hospital with complaints of worsening epigastric abdominal pain for the past 2 weeks. Patient was recently seen in the ED for similar complaints and was discharged home on Pepcid and Nexium. However patient states that her symptoms have not gotten any better. She states that her epigastric pain is radiating to her back and feels like a "ball". Patient unable to tolerate p.o. and states that she feels like food is stuck in her chest. She states that she is hungry but unable to eat. She also dysphagia liquids and states that every time she drinks, she has pain in the middle of her chest.  Patient states that she has never seen a GI specialist and has never done endoscopy or colonoscopy but has an appointment upcoming with GI.  Patient denies any other alleviating or exacerbating factors.  Patient denies headache, blurry vision, vomiting, shortness on breath, hematemesis, hematochezia, hematuria, melena, or any other associated symptoms.    In the ED:  Patient afebrile without leukocytosis, hemodynamically stable, CBC unremarkable, BUN 4, calcium 10.7, total bilirubin 1.8, BNP and troponin levels normal, UA with trace leukocytes, D-dimer 0.81, chest x-ray with no acute process, CTA chest shows no evidence of PE or no acute intrathoracic abnormality, ultrasound abdomen shows no significant abnormalities with no biliary ductal dilation and normal-appearing gallbladder.  Patient given  acetaminophen 1000 mg, GI cocktail, morphine 2 mg IV x2, pantoprazole 40 mg IV,, and 1 L NS bolus in the ED.    Case discussed with ED provider and patient will be placed under observation for further medical management.  "

## 2024-04-04 NOTE — SUBJECTIVE & OBJECTIVE
Interval History: acid reflux after apple juice. Epigastric pain still present but improving. Afraid to eat.     Review of Systems   Constitutional: Negative.    HENT: Negative.     Respiratory: Negative.     Cardiovascular:  Negative for chest pain.        Lower midsternal region   Gastrointestinal:  Positive for abdominal pain.        Epigastric abdominal pain radiating to the back, indigestion, feeling of food stuck in chest   Genitourinary: Negative.    Musculoskeletal: Negative.    Skin: Negative.    Neurological:  Positive for numbness.        Left hand numbness (history of CVA)   Psychiatric/Behavioral: Negative.       Objective:     Vital Signs (Most Recent):  Temp: 98.8 °F (37.1 °C) (04/04/24 1751)  Pulse: 78 (04/04/24 1751)  Resp: 18 (04/04/24 1751)  BP: 139/78 (04/04/24 1751)  SpO2: 97 % (04/04/24 1751) Vital Signs (24h Range):  Temp:  [97.8 °F (36.6 °C)-98.8 °F (37.1 °C)] 98.8 °F (37.1 °C)  Pulse:  [68-99] 78  Resp:  [12-20] 18  SpO2:  [95 %-100 %] 97 %  BP: (104-155)/(59-91) 139/78     Weight: 62.6 kg (138 lb 0.1 oz)  Body mass index is 26.08 kg/m².    Intake/Output Summary (Last 24 hours) at 4/4/2024 1857  Last data filed at 4/4/2024 1745  Gross per 24 hour   Intake 1660 ml   Output --   Net 1660 ml         Physical Exam  Constitutional:       General: She is not in acute distress.     Appearance: Normal appearance. She is not ill-appearing or diaphoretic.   HENT:      Mouth/Throat:      Mouth: Mucous membranes are moist.   Eyes:      Extraocular Movements: Extraocular movements intact.   Cardiovascular:      Rate and Rhythm: Normal rate.      Pulses: Normal pulses.   Pulmonary:      Effort: Pulmonary effort is normal. No respiratory distress.      Comments: Speaking in full sentences on RA  Abdominal:      General: Abdomen is flat.      Palpations: Abdomen is soft.      Tenderness: There is abdominal tenderness (epigastric). There is no guarding or rebound.   Musculoskeletal:      Right lower leg: No  edema.      Left lower leg: No edema.   Skin:     General: Skin is warm.   Neurological:      General: No focal deficit present.      Mental Status: She is alert and oriented to person, place, and time. Mental status is at baseline.   Psychiatric:         Mood and Affect: Mood normal.         Behavior: Behavior normal.         Thought Content: Thought content normal.             Significant Labs: All pertinent labs within the past 24 hours have been reviewed.    Significant Imaging: I have reviewed all pertinent imaging results/findings within the past 24 hours.

## 2024-04-04 NOTE — INTERVAL H&P NOTE
The patient has been examined and the H&P has been reviewed:    I concur with the findings and no changes have occurred since H&P was written.    Anesthesia risks, benefits and alternative options discussed and understood by patient/family.    History and Exam unchanged from consult.    Procedure - EGD  Neck - supple  Plan of anesthesia - General  ASA - per anesthesia  Mallampati - per anesthesia  Anesthesia problems - no  Family history of anesthesia problems - no        Active Hospital Problems    Diagnosis  POA    *Epigastric abdominal pain [R10.13]  Yes    Dysphagia [R13.10]  Yes    History of stroke [Z86.73]  Not Applicable     October 2019. Discussed 2/2 stroke prevention including vascular control      Hyperlipidemia [E78.5]  Yes     LDL goal less than 100.  Patient on Lipitor 40 mg daily.  Tolerating well.      Gastroesophageal reflux disease without esophagitis [K21.9]  Yes     Patient on PPI.  Discussed the importance of  the dose of her antacid medication and her statin medication for maximum efficacy of both.      Chest pain, atypical [R07.89]  Yes    Essential hypertension [I10]  Yes     Blood pressure goal less than 140/90.  Patient has been meeting her goal on current dose of amlodipine 5 mg daily.        Resolved Hospital Problems   No resolved problems to display.

## 2024-04-04 NOTE — PLAN OF CARE
Inpatient Upgrade Note    Grazyna Durand has warranted treatment spanning two or more midnights of hospital level care for the management of  abdominal pain . She continues to require further evaluation by consultants. Her condition is also complicated by the following comorbidities: Hypertension.

## 2024-04-04 NOTE — ADMISSIONCARE
AdmissionCare    Guideline: Abdominal Pain, Undiagnosed, Inpatient    Based on the indications selected for the patient, the bed status of Inpatient was determined to be MET    The following indications were selected as present at the time of evaluation of the patient:      - Severe pain requiring acute inpatient management, as indicated by 1 or more of the following:     - Pain insufficiently responsive to nonpharmacologic and nonopioid pharmacologic analgesia (eg, NSAIDs)     - Alternative routes (eg, oral, transdermal, or submucosal) for opioid treatment not appropriate or not sufficient    - Pain control regimen for next level of care not established (eg, in observation care), as indicated by 1 or more of the following:     - Sufficient pain control not achieved (eg, active adjustments to medications, dose, and route are ongoing)    AdmissionCare documentation entered by: Karlie Bucio    [facility] [employer], 27th edition, Copyright © 2023 [facility] [employer], [facility] All Rights Reserved.  [id number]:35:50-05:00

## 2024-04-04 NOTE — PHARMACY MED REC
"Admission Medication History     The home medication history was taken by Shruti Brown CPhT.    You may go to "Admission" then "Reconcile Home Medications" tabs to review and/or act upon these items.     The home medication list has been updated by the Pharmacy department.   Please read ALL comments highlighted in yellow.   Please address this information as you see fit.    Feel free to contact us if you have any questions or require assistance.      The medications listed below were removed from the home medication list. Please reorder if appropriate:  Patient reports no longer taking the following medication(s):  Ultram 50 mg tab  Lidoderm 5%  Zyrtec 10 mg tab  Vistaril 25 mg cap   Claritin 10 mg tab  Ecotrin 81 mg tab  Lyrica 100 mg tab  Tessalon 100 mg tab  Zofran 4 mg tab  Protonix 40 mg tab  Deltasone 20 mg tab  Zanaflex 4 mg  Effexor-XR 37.5 mg tab  Voltaren 1% gel  Folvite 1 mg tab  Ativan 0.5 mg tab  Desyrel 100 mg tab      Medications listed below were obtained from: Patient/family and Analytic software- [email or web address]  (Not in a hospital admission)            Shruti Brown CPhT.  [phone]                  .          "

## 2024-04-04 NOTE — CONSULTS
[facility] Consultation Note    Patient Complaint: epigastric pain    PCP:   Yvon Good       LOS: 0        Initial History of Present Illness (HPI):  This is a 68 y.o. female consulted to GI service for intractable epigastric pain. PMH hypertension, hyperlipidemia, GERD, anxiety, and history of stroke.  Patient complaint of acute worsening of epigastric pain with sensation of epigastric pressure and food feeling stuck that began intermittently 2 weeks ago. She reports pain has improved in ED but pressure sensation remain. Denies hematemesis, inability to swallow, choking, diarrhea or constipation. Denies ever having endoscopy. Reports visiting ED earlier in the week and dx with GERD and rx pepcid and nexium, to which she reports mild improvement. Denies smoking, drinking.      Medical History:  has a past medical history of Chronic pain, Hyperlipidemia, Hypertension, and Stroke.    Surgical History:  has a past surgical history that includes Leg Tendon Surgery (Right) and Bunionectomy.      Objective Findings:    Vital Signs:  Temp:  [97.9 °F (36.6 °C)-98.8 °F (37.1 °C)]   Pulse:  [68-99]   Resp:  [10-20]   BP: (110-155)/(67-91)   SpO2:  [95 %-99 %]   Body mass index is 26.45 kg/m².      Physical Exam  Vitals and nursing note reviewed.   Constitutional:       Appearance: Normal appearance.   HENT:      Head: Normocephalic.   Pulmonary:      Effort: Pulmonary effort is normal.   Abdominal:      General: Bowel sounds are normal.      Palpations: Abdomen is soft.   Skin:     General: Skin is warm and dry.   Neurological:      Mental Status: She is alert and oriented to person, place, and time.   Psychiatric:         Mood and Affect: Mood normal.         Behavior: Behavior normal.         Thought Content: Thought content normal.         Judgment: Judgment normal.               Labs:  Lab Results   Component Value Date    WBC 6.27 04/04/2024    HGB 12.1 04/04/2024    HCT 37.8 04/04/2024    PLT  223 2024    CHOL 159 02/10/2020    TRIG 120 02/10/2020    HDL 45 02/10/2020    ALT <5 (L) 2024    AST 13 2024     2024    K 3.5 2024     (H) 2024    CREATININE 0.8 2024    BUN 5 (L) 2024    CO2 21 (L) 2024    TSH 2.032 2024    INR 1.1 2021    HGBA1C 5.4 2016             Imagin/3 US abdomen- The liver is normal in size measuring 14cm. Hepatic parenchyma is homogeneous without evidence for masses. No intra- or extrahepatic biliary ductal dilatation. The common bile duct measures 0.3 cm. The gallbladder appears normal. No evidence for cholelithiasis. Visualized portion of the pancreas is normal. No evidence for cholelithiasis.      Endoscopy:     I have independently reviewed and interpreted the imaging above           Epigastric pain. Nausea and vomiting. Dysphagia.  Plan/ Recommendations:  1.  Second visit to the ED with similar complaint of epigastric pain and pressure. Agree with ppi, rec transition to iv bid. Remain npo. Plan for egd today.         Thank you so much for allowing us to participate in the care of Grazyna Durand . Please contact us if you have any additional questions.    Ysabel Cortes NP  Gastroenterology  [facility] [place] [facility]

## 2024-04-05 VITALS
WEIGHT: 138 LBS | HEART RATE: 107 BPM | OXYGEN SATURATION: 97 % | SYSTOLIC BLOOD PRESSURE: 130 MMHG | HEIGHT: 61 IN | TEMPERATURE: 99 F | RESPIRATION RATE: 16 BRPM | BODY MASS INDEX: 26.06 KG/M2 | DIASTOLIC BLOOD PRESSURE: 79 MMHG

## 2024-04-05 LAB
ALBUMIN SERPL BCP-MCNC: 3.8 G/DL (ref 3.5–5.2)
ALP SERPL-CCNC: 57 U/L (ref 55–135)
ALT SERPL W/O P-5'-P-CCNC: 6 U/L (ref 10–44)
ANION GAP SERPL CALC-SCNC: 9 MMOL/L (ref 8–16)
ASCENDING AORTA: 3.1 CM
AST SERPL-CCNC: 14 U/L (ref 10–40)
AV INDEX (PROSTH): 0.84
AV MEAN GRADIENT: 4 MMHG
AV PEAK GRADIENT: 7 MMHG
AV VALVE AREA BY VELOCITY RATIO: 2.07 CM²
AV VALVE AREA: 2.95 CM²
AV VELOCITY RATIO: 0.59
BASOPHILS # BLD AUTO: 0.02 K/UL (ref 0–0.2)
BASOPHILS NFR BLD: 0.3 % (ref 0–1.9)
BILIRUB SERPL-MCNC: 1.8 MG/DL (ref 0.1–1)
BSA FOR ECHO PROCEDURE: 1.64 M2
BUN SERPL-MCNC: 4 MG/DL (ref 8–23)
CALCIUM SERPL-MCNC: 9.6 MG/DL (ref 8.7–10.5)
CHLORIDE SERPL-SCNC: 108 MMOL/L (ref 95–110)
CO2 SERPL-SCNC: 22 MMOL/L (ref 23–29)
CREAT SERPL-MCNC: 0.7 MG/DL (ref 0.5–1.4)
CV ECHO LV RWT: 0.52 CM
DIFFERENTIAL METHOD BLD: ABNORMAL
DOP CALC AO PEAK VEL: 1.3 M/S
DOP CALC AO VTI: 18 CM
DOP CALC LVOT AREA: 3.5 CM2
DOP CALC LVOT DIAMETER: 2.11 CM
DOP CALC LVOT PEAK VEL: 0.77 M/S
DOP CALC LVOT STROKE VOLUME: 53.12 CM3
DOP CALCLVOT PEAK VEL VTI: 15.2 CM
E WAVE DECELERATION TIME: 314.16 MSEC
E/A RATIO: 0.71
E/E' RATIO: 8.67 M/S
ECHO LV POSTERIOR WALL: 0.91 CM (ref 0.6–1.1)
EOSINOPHIL # BLD AUTO: 0.1 K/UL (ref 0–0.5)
EOSINOPHIL NFR BLD: 0.7 % (ref 0–8)
ERYTHROCYTE [DISTWIDTH] IN BLOOD BY AUTOMATED COUNT: 14.3 % (ref 11.5–14.5)
EST. GFR  (NO RACE VARIABLE): >60 ML/MIN/1.73 M^2
FRACTIONAL SHORTENING: 45 % (ref 28–44)
GLUCOSE SERPL-MCNC: 80 MG/DL (ref 70–110)
HCT VFR BLD AUTO: 40.8 % (ref 37–48.5)
HGB BLD-MCNC: 12.8 G/DL (ref 12–16)
IMM GRANULOCYTES # BLD AUTO: 0.01 K/UL (ref 0–0.04)
IMM GRANULOCYTES NFR BLD AUTO: 0.1 % (ref 0–0.5)
INTERVENTRICULAR SEPTUM: 0.81 CM (ref 0.6–1.1)
IVC DIAMETER: 1.08 CM
LA MAJOR: 3.47 CM
LA MINOR: 3.85 CM
LA WIDTH: 3.6 CM
LEFT ATRIUM SIZE: 3.1 CM
LEFT ATRIUM VOLUME INDEX: 21.5 ML/M2
LEFT ATRIUM VOLUME: 34.63 CM3
LEFT INTERNAL DIMENSION IN SYSTOLE: 1.95 CM (ref 2.1–4)
LEFT VENTRICLE DIASTOLIC VOLUME INDEX: 32.11 ML/M2
LEFT VENTRICLE DIASTOLIC VOLUME: 51.69 ML
LEFT VENTRICLE MASS INDEX: 52 G/M2
LEFT VENTRICLE SYSTOLIC VOLUME INDEX: 7.4 ML/M2
LEFT VENTRICLE SYSTOLIC VOLUME: 11.86 ML
LEFT VENTRICULAR INTERNAL DIMENSION IN DIASTOLE: 3.52 CM (ref 3.5–6)
LEFT VENTRICULAR MASS: 84.02 G
LV LATERAL E/E' RATIO: 8.13 M/S
LV SEPTAL E/E' RATIO: 9.29 M/S
LVOT MG: 1.21 MMHG
LVOT MV: 0.52 CM/S
LYMPHOCYTES # BLD AUTO: 2 K/UL (ref 1–4.8)
LYMPHOCYTES NFR BLD: 29.5 % (ref 18–48)
MAGNESIUM SERPL-MCNC: 2.1 MG/DL (ref 1.6–2.6)
MCH RBC QN AUTO: 27.1 PG (ref 27–31)
MCHC RBC AUTO-ENTMCNC: 31.4 G/DL (ref 32–36)
MCV RBC AUTO: 86 FL (ref 82–98)
MONOCYTES # BLD AUTO: 0.5 K/UL (ref 0.3–1)
MONOCYTES NFR BLD: 7.9 % (ref 4–15)
MV PEAK A VEL: 0.91 M/S
MV PEAK E VEL: 0.65 M/S
MV STENOSIS PRESSURE HALF TIME: 91.11 MS
MV VALVE AREA P 1/2 METHOD: 2.41 CM2
NEUTROPHILS # BLD AUTO: 4.2 K/UL (ref 1.8–7.7)
NEUTROPHILS NFR BLD: 61.5 % (ref 38–73)
NRBC BLD-RTO: 0 /100 WBC
PHOSPHATE SERPL-MCNC: 3.3 MG/DL (ref 2.7–4.5)
PISA TR MAX VEL: 2.69 M/S
PLATELET # BLD AUTO: 236 K/UL (ref 150–450)
PMV BLD AUTO: 10.4 FL (ref 9.2–12.9)
POTASSIUM SERPL-SCNC: 3.7 MMOL/L (ref 3.5–5.1)
PROT SERPL-MCNC: 6.3 G/DL (ref 6–8.4)
PV PEAK GRADIENT: 4 MMHG
PV PEAK VELOCITY: 1.01 M/S
RA MAJOR: 3.58 CM
RA PRESSURE ESTIMATED: 3 MMHG
RA WIDTH: 3 CM
RBC # BLD AUTO: 4.72 M/UL (ref 4–5.4)
RIGHT VENTRICULAR END-DIASTOLIC DIMENSION: 3.34 CM
RV TB RVSP: 6 MMHG
RV TISSUE DOPPLER FREE WALL SYSTOLIC VELOCITY 1 (APICAL 4 CHAMBER VIEW): 19.25 CM/S
SINUS: 2.94 CM
SODIUM SERPL-SCNC: 139 MMOL/L (ref 136–145)
STJ: 2.71 CM
TDI LATERAL: 0.08 M/S
TDI SEPTAL: 0.07 M/S
TDI: 0.08 M/S
TR MAX PG: 29 MMHG
TRICUSPID ANNULAR PLANE SYSTOLIC EXCURSION: 2.65 CM
TROPONIN I SERPL DL<=0.01 NG/ML-MCNC: <0.006 NG/ML (ref 0–0.03)
TV REST PULMONARY ARTERY PRESSURE: 32 MMHG
WBC # BLD AUTO: 6.85 K/UL (ref 3.9–12.7)
Z-SCORE OF LEFT VENTRICULAR DIMENSION IN END DIASTOLE: -2.61
Z-SCORE OF LEFT VENTRICULAR DIMENSION IN END SYSTOLE: -2.95

## 2024-04-05 PROCEDURE — 84100 ASSAY OF PHOSPHORUS: CPT

## 2024-04-05 PROCEDURE — 80053 COMPREHEN METABOLIC PANEL: CPT

## 2024-04-05 PROCEDURE — 99232 SBSQ HOSP IP/OBS MODERATE 35: CPT | Mod: ,,, | Performed by: NURSE PRACTITIONER

## 2024-04-05 PROCEDURE — G0378 HOSPITAL OBSERVATION PER HR: HCPCS

## 2024-04-05 PROCEDURE — 36415 COLL VENOUS BLD VENIPUNCTURE: CPT

## 2024-04-05 PROCEDURE — 25000003 PHARM REV CODE 250

## 2024-04-05 PROCEDURE — 25000003 PHARM REV CODE 250: Performed by: NURSE PRACTITIONER

## 2024-04-05 PROCEDURE — 36415 COLL VENOUS BLD VENIPUNCTURE: CPT | Performed by: NURSE PRACTITIONER

## 2024-04-05 PROCEDURE — 99214 OFFICE O/P EST MOD 30 MIN: CPT | Mod: ,,, | Performed by: STUDENT IN AN ORGANIZED HEALTH CARE EDUCATION/TRAINING PROGRAM

## 2024-04-05 PROCEDURE — 63600175 PHARM REV CODE 636 W HCPCS: Performed by: NURSE PRACTITIONER

## 2024-04-05 PROCEDURE — 84484 ASSAY OF TROPONIN QUANT: CPT | Performed by: NURSE PRACTITIONER

## 2024-04-05 PROCEDURE — C9113 INJ PANTOPRAZOLE SODIUM, VIA: HCPCS | Performed by: NURSE PRACTITIONER

## 2024-04-05 PROCEDURE — 83735 ASSAY OF MAGNESIUM: CPT

## 2024-04-05 PROCEDURE — 85025 COMPLETE CBC W/AUTO DIFF WBC: CPT

## 2024-04-05 RX ORDER — SIMETHICONE 80 MG
1 TABLET,CHEWABLE ORAL
Status: DISCONTINUED | OUTPATIENT
Start: 2024-04-05 | End: 2024-04-05 | Stop reason: HOSPADM

## 2024-04-05 RX ADMIN — PANTOPRAZOLE SODIUM 40 MG: 40 INJECTION, POWDER, FOR SOLUTION INTRAVENOUS at 08:04

## 2024-04-05 RX ADMIN — ATORVASTATIN CALCIUM 40 MG: 40 TABLET, FILM COATED ORAL at 09:04

## 2024-04-05 RX ADMIN — SIMETHICONE 80 MG: 80 TABLET, CHEWABLE ORAL at 02:04

## 2024-04-05 RX ADMIN — CITALOPRAM HYDROBROMIDE 40 MG: 20 TABLET ORAL at 09:04

## 2024-04-05 RX ADMIN — SIMETHICONE 80 MG: 80 TABLET, CHEWABLE ORAL at 12:04

## 2024-04-05 RX ADMIN — SIMETHICONE 80 MG: 80 TABLET, CHEWABLE ORAL at 09:04

## 2024-04-05 RX ADMIN — AMLODIPINE BESYLATE 10 MG: 5 TABLET ORAL at 09:04

## 2024-04-05 NOTE — HPI
68 y.o. with medical history of HTN, HLD, CVA, anxiety, stress disorder - with admission concerns of epigastric abdominal pain / dysphagia. Focal neurological symptoms during the hospital stay - and neurology has been consulted for the same.     Hospital course / Patient afebrile without leukocytosis, hemodynamically stable, CBC unremarkable, BUN 4, calcium 10.7, total bilirubin 1.8, BNP and troponin levels normal, UA with trace leukocytes, D-dimer 0.81, chest x-ray with no acute process, CTA chest shows no evidence of PE or no acute intrathoracic abnormality, ultrasound abdomen shows no significant abnormalities with no biliary ductal dilation and normal-appearing gallbladder. Admission to the hospital for epigastric pain similar to previous visits to the ED. EGD showed normal esophagus stomach and examined duodenum.  GI recommended CT of the abdomen with and without contrast which showed no acute intra abdominal process.     C/o left sided numbness / similar to prior stroke symptoms / with no focal motor deficits or cranial nerve deficits. Background of stressors. CT head negative for acute findings

## 2024-04-05 NOTE — PLAN OF CARE
Case Management Assessment     PCP: [facility]  Pharmacy: [facility]    Patient Arrived From: home  Existing Help at Home:     Lizzy Silveira (Daughter)  [phone] (Mobile)       Barriers to Discharge: Medical Care    Discharge Plan:    A. Home    B. home    Lives alone. Now goes to [facility] .Drives self. Children lives in [place]         04/05/24 1005   Discharge Assessment   Assessment Type Discharge Planning Assessment   Confirmed/corrected address, phone number and insurance Yes   Source of Information patient   People in Home child(tosha), adult   OTHER   Name(s) of People in Home Lizzy Silveira (Daughter) [phone] (Mobile)

## 2024-04-05 NOTE — SUBJECTIVE & OBJECTIVE
Past Medical History:   Diagnosis Date    Chronic pain     left arm    Hyperlipidemia     Hypertension     Stroke     2019       Past Surgical History:   Procedure Laterality Date    BUNIONECTOMY      ESOPHAGOGASTRODUODENOSCOPY N/A 4/4/2024    Procedure: EGD (ESOPHAGOGASTRODUODENOSCOPY);  Surgeon: Sudarshan Murdock MD;  Location: [facility];  Service: Endoscopy;  Laterality: N/A;    LEG TENDON SURGERY Right        Review of patient's allergies indicates:   Allergen Reactions    Benadryl [diphenhydramine hcl]      cuase her bp to elevate    Prednisone Other (See Comments)     Reactions:  Increased blood pressure    Asa-calcium carb-mag-aluminum Other (See Comments)     Acid reflux  Acid reflux      Aspirin Other (See Comments)    Ibuprofen Other (See Comments)     Hx of stroke per patient. Patient  states that she cannot take.    Penicillins     Powder base no.200 Rash     Powder in gloves causes rash on hands, Denies any reaction to latex products       Current Neurological Medications:     No current facility-administered medications on file prior to encounter.     Current Outpatient Medications on File Prior to Encounter   Medication Sig    amLODIPine (NORVASC) 10 MG tablet Take 10 mg by mouth once daily.    atorvastatin (LIPITOR) 40 MG tablet Take 40 mg by mouth once daily.    citalopram (CELEXA) 40 MG tablet Take 40 mg by mouth.    esomeprazole (NEXIUM) 40 MG capsule Take 1 capsule (40 mg total) by mouth before breakfast.    fluticasone propionate (FLONASE) 50 mcg/actuation nasal spray 1 spray by Each Nostril route once daily.    QUEtiapine (SEROQUEL) 50 MG tablet Take 50 mg by mouth every evening.    [DISCONTINUED] aspirin (ECOTRIN) 81 MG EC tablet Take 1 tablet (81 mg total) by mouth once daily.    [DISCONTINUED] folic acid (FOLVITE) 1 MG tablet Take 1 mg by mouth once daily.     Family History       Problem Relation (Age of Onset)    Cancer Mother    No Known Problems Father    Ovarian cancer Sister          Tobacco  "Use    Smoking status: Never    Smokeless tobacco: Never   Substance and Sexual Activity    Alcohol use: Not Currently     Comment: quit 10/19    Drug use: No    Sexual activity: Not Currently     Partners: Male     Birth control/protection: None     Review of Systems   Constitutional:  Negative for chills and fever.   Respiratory:  Negative for shortness of breath.    Cardiovascular:  Negative for chest pain.   Neurological:  Positive for numbness. Negative for dizziness and weakness.     Objective:     Vital Signs (Most Recent):  Temp: 98.2 °F (36.8 °C) (04/05/24 1217)  Pulse: 102 (04/05/24 1224)  Resp: (!) 22 (04/05/24 1217)  BP: 127/80 (04/05/24 1224)  SpO2: 99 % (04/05/24 1224) Vital Signs (24h Range):  Temp:  [97.9 °F (36.6 °C)-98.9 °F (37.2 °C)] 98.2 °F (36.8 °C)  Pulse:  [] 102  Resp:  [16-22] 22  SpO2:  [95 %-100 %] 99 %  BP: (121-150)/(74-83) 127/80     Weight: 62.6 kg (138 lb 0.1 oz)  Body mass index is 26.08 kg/m².     Physical Exam  HENT:      Head: Normocephalic and atraumatic.   Eyes:      Extraocular Movements: Extraocular movements intact and EOM normal.   Pulmonary:      Effort: No respiratory distress.   Neurological:      Mental Status: She is oriented to person, place, and time.   Psychiatric:         Speech: Speech normal.          NEUROLOGICAL EXAMINATION:     MENTAL STATUS   Oriented to person, place, and time.   Attention: normal.   Speech: speech is normal   Level of consciousness: alert    CRANIAL NERVES     CN III, IV, VI   Extraocular motions are normal.   Nystagmus: none   Ophthalmoparesis: none    CN VII   Facial expression full, symmetric.     MOTOR EXAM        No new focal motor deficits         Significant Labs: Hemoglobin A1c: No results for input(s): "HGBA1C" in the last 720 hours.  Blood Culture: No results for input(s): "LABBLOO" in the last 48 hours.  BMP:   Recent Labs   Lab 04/04/24  0539 04/05/24  0409   GLU 89 80    139   K 3.5 3.7   * 108   CO2 21* 22* " "  BUN 5* 4*   CREATININE 0.8 0.7   CALCIUM 8.7 9.6   MG 2.2 2.1     CBC:   Recent Labs   Lab 04/04/24  0539 04/05/24  0409   WBC 6.27 6.85   HGB 12.1 12.8   HCT 37.8 40.8    236     CMP:   Recent Labs   Lab 04/04/24  0539 04/05/24  0409   GLU 89 80    139   K 3.5 3.7   * 108   CO2 21* 22*   BUN 5* 4*   CREATININE 0.8 0.7   CALCIUM 8.7 9.6   MG 2.2 2.1   PROT 5.7* 6.3   ALBUMIN 3.3* 3.8   BILITOT 1.4* 1.8*   ALKPHOS 49* 57   AST 13 14   ALT <5* 6*   ANIONGAP 7* 9     CSF Culture: No results for input(s): "CSFCULTURE" in the last 48 hours.  CSF Studies: No results for input(s): "ALIQUT", "APPEARCSF", "COLORCSF", "CSFWBC", "CSFRBC", "GLUCCSF", "LDHCSF", "PROTEINCSF", "VDRLCSF" in the last 48 hours.  Inflammatory Markers: No results for input(s): "SEDRATE", "CRP", "PROCAL" in the last 48 hours.  POCT Glucose: No results for input(s): "POCTGLUCOSE" in the last 24 hours.  Prealbumin: No results for input(s): "PREALBUMIN" in the last 48 hours.  Respiratory Culture: No results for input(s): "GSRESP", "RESPIRATORYC" in the last 48 hours.  Urine Culture: No results for input(s): "LABURIN" in the last 48 hours.  Urine Studies: No results for input(s): "COLORU", "APPEARANCEUA", "PHUR", "SPECGRAV", "PROTEINUA", "GLUCUA", "KETONESU", "BILIRUBINUA", "OCCULTUA", "NITRITE", "UROBILINOGEN", "LEUKOCYTESUR", "RBCUA", "WBCUA", "BACTERIA", "SQUAMEPITHEL", "HYALINECASTS" in the last 48 hours.    Invalid input(s): "VANESSA"  Recent Lab Results         04/05/24  1242   04/05/24  0409        Albumin   3.8       ALP   57       ALT   6       Anion Gap   9       AST   14       Baso #   0.02       Basophil %   0.3       BILIRUBIN TOTAL   1.8  Comment: For infants and newborns, interpretation of results should be based  on gestational age, weight and in agreement with clinical  observations.    Premature Infant recommended reference ranges:  Up to 24 hours.............<8.0 mg/dL  Up to 48 hours............<12.0 mg/dL  3-5 " days..................<15.0 mg/dL  6-29 days.................<15.0 mg/dL         BUN   4       Calcium   9.6       Chloride   108       CO2   22       Creatinine   0.7       Differential Method   Automated       eGFR   >60       Eos #   0.1       Eos %   0.7       Glucose   80       Gran # (ANC)   4.2       Gran %   61.5       Hematocrit   40.8       Hemoglobin   12.8       Immature Grans (Abs)   0.01  Comment: Mild elevation in immature granulocytes is non specific and   can be seen in a variety of conditions including stress response,   acute inflammation, trauma and pregnancy. Correlation with other   laboratory and clinical findings is essential.         Immature Granulocytes   0.1       Lymph #   2.0       Lymph %   29.5       Magnesium    2.1       MCH   27.1       MCHC   31.4       MCV   86       Mono #   0.5       Mono %   7.9       MPV   10.4       nRBC   0       Phosphorus Level   3.3       Platelet Count   236       Potassium   3.7       PROTEIN TOTAL   6.3       RBC   4.72       RDW   14.3       Sodium   139       Troponin I <0.006  Comment: The reference interval for Troponin I represents the 99th percentile   cutoff   for our facility and is consistent with 3rd generation assay   performance.           WBC   6.85             All pertinent lab results from the past 24 hours have been reviewed.      Significant Imaging: I have reviewed and interpreted all pertinent imaging results/findings within the past 24 hours.

## 2024-04-05 NOTE — SUBJECTIVE & OBJECTIVE
Interval History: dc cancel. States left side numbness last night but did not tell staff. 1145 left side numbness and blurry vision. States left chest wall pressure then told nurse later mid sternal chest pain.     Review of Systems   Constitutional: Negative.    HENT: Negative.     Respiratory:  Positive for chest tightness.    Cardiovascular:  Negative for chest pain.        Lower midsternal region   Gastrointestinal:  Abdominal pain: resolved.        Epigastric abdominal pain radiating to the back, indigestion, feeling of food stuck in chest   Genitourinary: Negative.    Musculoskeletal: Negative.    Skin: Negative.    Neurological:  Positive for numbness.        Left hand numbness (history of CVA)   Psychiatric/Behavioral: Negative.       Objective:     Vital Signs (Most Recent):  Temp: 98.2 °F (36.8 °C) (04/05/24 1217)  Pulse: 102 (04/05/24 1224)  Resp: (!) 2 (04/05/24 1217)  BP: 127/80 (04/05/24 1224)  SpO2: 99 % (04/05/24 1224) Vital Signs (24h Range):  Temp:  [97.8 °F (36.6 °C)-98.9 °F (37.2 °C)] 98.2 °F (36.8 °C)  Pulse:  [] 102  Resp:  [2-20] 2  SpO2:  [95 %-100 %] 99 %  BP: (104-150)/(59-83) 127/80     Weight: 62.6 kg (138 lb 0.1 oz)  Body mass index is 26.08 kg/m².    Intake/Output Summary (Last 24 hours) at 4/5/2024 1233  Last data filed at 4/5/2024 0845  Gross per 24 hour   Intake 1060 ml   Output --   Net 1060 ml         Physical Exam  Constitutional:       General: She is not in acute distress.     Appearance: Normal appearance. She is not ill-appearing or diaphoretic.   HENT:      Mouth/Throat:      Mouth: Mucous membranes are moist.   Eyes:      Extraocular Movements: Extraocular movements intact.   Cardiovascular:      Rate and Rhythm: Normal rate.      Pulses: Normal pulses.   Pulmonary:      Effort: Pulmonary effort is normal. No respiratory distress.      Comments: Speaking in full sentences on RA  Abdominal:      General: Abdomen is flat.      Palpations: Abdomen is soft.       Tenderness: Tenderness: resolved. There is no guarding or rebound.   Musculoskeletal:      Right lower leg: No edema.      Left lower leg: No edema.   Lymphadenopathy:      Cervical: No cervical adenopathy.   Skin:     General: Skin is warm.   Neurological:      General: No focal deficit present.      Mental Status: She is alert and oriented to person, place, and time. Mental status is at baseline.   Psychiatric:         Mood and Affect: Mood normal.         Behavior: Behavior normal.         Thought Content: Thought content normal.             Significant Labs: All pertinent labs within the past 24 hours have been reviewed.    Significant Imaging: I have reviewed all pertinent imaging results/findings within the past 24 hours.

## 2024-04-05 NOTE — CONSULTS
[facility] - [facility] Surg  Neurology  Consult Note    Patient Name: Grazyna Durand  MRN: [id number]  Admission Date: 4/3/2024  Hospital Length of Stay: 1 days  Code Status: Prior   Attending Provider: Jennifer Hilario DO   Consulting Provider: Ramsey Montano MD  Primary Care Physician: Yvon Good MD  Principal Problem:Chest pain, atypical    Inpatient consult to Telemedicine-General Neurology  Consult performed by: Ramsey Montano MD  Consult ordered by: Jenni Zhu NP         Subjective:     Chief Complaint:  Focal neurological deficits      HPI:   68 y.o. with medical history of HTN, HLD, CVA, anxiety, stress disorder - with admission concerns of epigastric abdominal pain / dysphagia. Focal neurological symptoms during the hospital stay - and neurology has been consulted for the same.     Hospital course / Patient afebrile without leukocytosis, hemodynamically stable, CBC unremarkable, BUN 4, calcium 10.7, total bilirubin 1.8, BNP and troponin levels normal, UA with trace leukocytes, D-dimer 0.81, chest x-ray with no acute process, CTA chest shows no evidence of PE or no acute intrathoracic abnormality, ultrasound abdomen shows no significant abnormalities with no biliary ductal dilation and normal-appearing gallbladder. Admission to the hospital for epigastric pain similar to previous visits to the ED. EGD showed normal esophagus stomach and examined duodenum.  GI recommended CT of the abdomen with and without contrast which showed no acute intra abdominal process.     C/o left sided numbness / similar to prior stroke symptoms / with no focal motor deficits or cranial nerve deficits. Background of stressors. CT head negative for acute findings        Past Medical History:   Diagnosis Date    Chronic pain     left arm    Hyperlipidemia     Hypertension     Stroke     2019       Past Surgical History:   Procedure Laterality Date    BUNIONECTOMY      ESOPHAGOGASTRODUODENOSCOPY N/A 4/4/2024     Procedure: EGD (ESOPHAGOGASTRODUODENOSCOPY);  Surgeon: Sudarshan Murdock MD;  Location: [facility];  Service: Endoscopy;  Laterality: N/A;    LEG TENDON SURGERY Right        Review of patient's allergies indicates:   Allergen Reactions    Benadryl [diphenhydramine hcl]      cuase her bp to elevate    Prednisone Other (See Comments)     Reactions:  Increased blood pressure    Asa-calcium carb-mag-aluminum Other (See Comments)     Acid reflux  Acid reflux      Aspirin Other (See Comments)    Ibuprofen Other (See Comments)     Hx of stroke per patient. Patient  states that she cannot take.    Penicillins     Powder base no.200 Rash     Powder in gloves causes rash on hands, Denies any reaction to latex products       Current Neurological Medications:     No current facility-administered medications on file prior to encounter.     Current Outpatient Medications on File Prior to Encounter   Medication Sig    amLODIPine (NORVASC) 10 MG tablet Take 10 mg by mouth once daily.    atorvastatin (LIPITOR) 40 MG tablet Take 40 mg by mouth once daily.    citalopram (CELEXA) 40 MG tablet Take 40 mg by mouth.    esomeprazole (NEXIUM) 40 MG capsule Take 1 capsule (40 mg total) by mouth before breakfast.    fluticasone propionate (FLONASE) 50 mcg/actuation nasal spray 1 spray by Each Nostril route once daily.    QUEtiapine (SEROQUEL) 50 MG tablet Take 50 mg by mouth every evening.    [DISCONTINUED] aspirin (ECOTRIN) 81 MG EC tablet Take 1 tablet (81 mg total) by mouth once daily.    [DISCONTINUED] folic acid (FOLVITE) 1 MG tablet Take 1 mg by mouth once daily.     Family History       Problem Relation (Age of Onset)    Cancer Mother    No Known Problems Father    Ovarian cancer Sister          Tobacco Use    Smoking status: Never    Smokeless tobacco: Never   Substance and Sexual Activity    Alcohol use: Not Currently     Comment: quit 10/19    Drug use: No    Sexual activity: Not Currently     Partners: Male     Birth control/protection:  "None     Review of Systems   Constitutional:  Negative for chills and fever.   Respiratory:  Negative for shortness of breath.    Cardiovascular:  Negative for chest pain.   Neurological:  Positive for numbness. Negative for dizziness and weakness.     Objective:     Vital Signs (Most Recent):  Temp: 98.2 °F (36.8 °C) (04/05/24 1217)  Pulse: 102 (04/05/24 1224)  Resp: (!) 22 (04/05/24 1217)  BP: 127/80 (04/05/24 1224)  SpO2: 99 % (04/05/24 1224) Vital Signs (24h Range):  Temp:  [97.9 °F (36.6 °C)-98.9 °F (37.2 °C)] 98.2 °F (36.8 °C)  Pulse:  [] 102  Resp:  [16-22] 22  SpO2:  [95 %-100 %] 99 %  BP: (121-150)/(74-83) 127/80     Weight: 62.6 kg (138 lb 0.1 oz)  Body mass index is 26.08 kg/m².     Physical Exam  HENT:      Head: Normocephalic and atraumatic.   Eyes:      Extraocular Movements: Extraocular movements intact and EOM normal.   Pulmonary:      Effort: No respiratory distress.   Neurological:      Mental Status: She is oriented to person, place, and time.   Psychiatric:         Speech: Speech normal.          NEUROLOGICAL EXAMINATION:     MENTAL STATUS   Oriented to person, place, and time.   Attention: normal.   Speech: speech is normal   Level of consciousness: alert    CRANIAL NERVES     CN III, IV, VI   Extraocular motions are normal.   Nystagmus: none   Ophthalmoparesis: none    CN VII   Facial expression full, symmetric.     MOTOR EXAM        No new focal motor deficits         Significant Labs: Hemoglobin A1c: No results for input(s): "HGBA1C" in the last 720 hours.  Blood Culture: No results for input(s): "LABBLOO" in the last 48 hours.  BMP:   Recent Labs   Lab 04/04/24 0539 04/05/24 0409   GLU 89 80    139   K 3.5 3.7   * 108   CO2 21* 22*   BUN 5* 4*   CREATININE 0.8 0.7   CALCIUM 8.7 9.6   MG 2.2 2.1     CBC:   Recent Labs   Lab 04/04/24 0539 04/05/24  0409   WBC 6.27 6.85   HGB 12.1 12.8   HCT 37.8 40.8    236     CMP:   Recent Labs   Lab 04/04/24  0539 04/05/24  0409 " "  GLU 89 80    139   K 3.5 3.7   * 108   CO2 21* 22*   BUN 5* 4*   CREATININE 0.8 0.7   CALCIUM 8.7 9.6   MG 2.2 2.1   PROT 5.7* 6.3   ALBUMIN 3.3* 3.8   BILITOT 1.4* 1.8*   ALKPHOS 49* 57   AST 13 14   ALT <5* 6*   ANIONGAP 7* 9     CSF Culture: No results for input(s): "CSFCULTURE" in the last 48 hours.  CSF Studies: No results for input(s): "ALIQUT", "APPEARCSF", "COLORCSF", "CSFWBC", "CSFRBC", "GLUCCSF", "LDHCSF", "PROTEINCSF", "VDRLCSF" in the last 48 hours.  Inflammatory Markers: No results for input(s): "SEDRATE", "CRP", "PROCAL" in the last 48 hours.  POCT Glucose: No results for input(s): "POCTGLUCOSE" in the last 24 hours.  Prealbumin: No results for input(s): "PREALBUMIN" in the last 48 hours.  Respiratory Culture: No results for input(s): "GSRESP", "RESPIRATORYC" in the last 48 hours.  Urine Culture: No results for input(s): "LABURIN" in the last 48 hours.  Urine Studies: No results for input(s): "COLORU", "APPEARANCEUA", "PHUR", "SPECGRAV", "PROTEINUA", "GLUCUA", "KETONESU", "BILIRUBINUA", "OCCULTUA", "NITRITE", "UROBILINOGEN", "LEUKOCYTESUR", "RBCUA", "WBCUA", "BACTERIA", "SQUAMEPITHEL", "HYALINECASTS" in the last 48 hours.    Invalid input(s): "WRIGHTSUR"  Recent Lab Results         04/05/24  1242   04/05/24  0409        Albumin   3.8       ALP   57       ALT   6       Anion Gap   9       AST   14       Baso #   0.02       Basophil %   0.3       BILIRUBIN TOTAL   1.8  Comment: For infants and newborns, interpretation of results should be based  on gestational age, weight and in agreement with clinical  observations.    Premature Infant recommended reference ranges:  Up to 24 hours.............<8.0 mg/dL  Up to 48 hours............<12.0 mg/dL  3-5 days..................<15.0 mg/dL  6-29 days.................<15.0 mg/dL         BUN   4       Calcium   9.6       Chloride   108       CO2   22       Creatinine   0.7       Differential Method   Automated       eGFR   >60       Eos #   0.1       " Eos %   0.7       Glucose   80       Gran # (ANC)   4.2       Gran %   61.5       Hematocrit   40.8       Hemoglobin   12.8       Immature Grans (Abs)   0.01  Comment: Mild elevation in immature granulocytes is non specific and   can be seen in a variety of conditions including stress response,   acute inflammation, trauma and pregnancy. Correlation with other   laboratory and clinical findings is essential.         Immature Granulocytes   0.1       Lymph #   2.0       Lymph %   29.5       Magnesium    2.1       MCH   27.1       MCHC   31.4       MCV   86       Mono #   0.5       Mono %   7.9       MPV   10.4       nRBC   0       Phosphorus Level   3.3       Platelet Count   236       Potassium   3.7       PROTEIN TOTAL   6.3       RBC   4.72       RDW   14.3       Sodium   139       Troponin I <0.006  Comment: The reference interval for Troponin I represents the 99th percentile   cutoff   for our facility and is consistent with 3rd generation assay   performance.           WBC   6.85             All pertinent lab results from the past 24 hours have been reviewed.      Significant Imaging: I have reviewed and interpreted all pertinent imaging results/findings within the past 24 hours.  Assessment and Plan:     History of stroke  68 y.o. with medical history of HTN, HLD, CVA, anxiety, stress disorder - with admission concerns of epigastric abdominal pain / dysphagia. Focal neurological symptoms during the hospital stay - and neurology has been consulted for the same.     C/o left sided numbness / similar to prior stroke symptoms / with no focal motor deficits or cranial nerve deficits. Background of stressors. CT head negative for acute findings     Hospital course / Patient afebrile without leukocytosis, hemodynamically stable, CBC unremarkable, BUN 4, calcium 10.7, total bilirubin 1.8, BNP and troponin levels normal, UA with trace leukocytes, D-dimer 0.81, chest x-ray with no acute process, CTA chest shows no  evidence of PE or no acute intrathoracic abnormality, ultrasound abdomen shows no significant abnormalities with no biliary ductal dilation and normal-appearing gallbladder. Admission to the hospital for epigastric pain similar to previous visits to the ED. EGD showed normal esophagus stomach and examined duodenum.  GI recommended CT of the abdomen with and without contrast which showed no acute intra abdominal process.     Recs:  Low suspicion for focal cerebrovascular ischemic event or epileptic or neuro inflammatory etiology - suspect recrudescence      - MRI brain wo contrast    If MRI brain positive for acute infarct follow the below   -  / Plavix 300 mg load - followed by DAPT X 21 day, ASA 81/Plavix 75, with asa thereafter  - target LDL < 70 / Continue atorvastatin  - euglycemic goals   - permissive HTN x 72 hrs post index event / long term < 140/90  - Echo f/u    - PT/OT recs - discharge planning   - F/u PCP for risk factor modification monitoring  -  on DASH diet; exercise and lifestyle modifications when appropriate   Provide stroke counseling provided during the visit - Identification of signs; emergency action and ER attention; Risk factor control, optimization for secondary stroke prevention; Compliance to medications   F/u vascular neurology              VTE Risk Mitigation (From admission, onward)           Ordered     IP VTE LOW RISK PATIENT  Once         04/03/24 2254     Place sequential compression device  Until discontinued         04/03/24 2254                    Thank you for your consult.     Ramsey Montano MD  Neurology  [facility] - Med Surg

## 2024-04-05 NOTE — NURSING
[facility], [facility]  Nurses Note -- 4 Eyes      4/5/2024       Skin assessed on: Q Shift      [x] No Pressure Injuries Present    [x]Prevention Measures Documented    [] Yes LDA  for Pressure Injury Previously documented     [] Yes New Pressure Injury Discovered   [] LDA for New Pressure Injury Added      Attending RN:  Barbie Gong LPN     Second RN:  Umu GantRN

## 2024-04-05 NOTE — ANESTHESIA POSTPROCEDURE EVALUATION
Anesthesia Post Evaluation    Patient: Grazyna Durand    Procedure(s) Performed: Procedure(s) (LRB):  EGD (ESOPHAGOGASTRODUODENOSCOPY) (N/A)    Final Anesthesia Type: general      Patient location during evaluation: floor  Patient participation: Yes- Able to Participate  Level of consciousness: awake and alert  Post-procedure vital signs: reviewed and stable  Pain management: adequate  Airway patency: patent    PONV status at discharge: No PONV  Anesthetic complications: no      Cardiovascular status: hemodynamically stable  Respiratory status: unassisted and spontaneous ventilation  Hydration status: euvolemic  Follow-up not needed.              Vitals Value Taken Time   /76 04/05/24 0724   Temp 37.2 °C (98.9 °F) 04/05/24 0724   Pulse 88 04/05/24 0724   Resp 18 04/05/24 0724   SpO2 97 % 04/05/24 0724         Event Time   Out of Recovery 04/04/2024 16:05:44         Pain/Bc Score: Pain Rating Prior to Med Admin: 3 (4/4/2024  6:30 PM)  Pain Rating Post Med Admin: 0 (4/4/2024  1:03 AM)  Bc Score: 10 (4/4/2024  3:43 PM)

## 2024-04-05 NOTE — ASSESSMENT & PLAN NOTE
History noted, history of CVA in 2020 and patient with left upper extremity weakness and numbness.  -4/5/2024 on discharge patient states left sided numbness and blurred vision in both eyes. States experience numbness last night but not sure how long.   On exam, no focal deficit on exam and good strength in all extremities 5/5  CT head now, work up for chest pain see above

## 2024-04-05 NOTE — PROGRESS NOTES
[facility] Progress Note    Patient Complaint: epigastric pain    PCP:   Yvon Good       LOS: 1        Initial History of Present Illness (HPI):  This is a 68 y.o. female consulted to GI service for intractable epigastric pain. PMH hypertension, hyperlipidemia, GERD, anxiety, and history of stroke.  Patient complaint of acute worsening of epigastric pain with sensation of epigastric pressure and food feeling stuck that began intermittently 2 weeks ago. She reports pain has improved in ED but pressure sensation remain. Denies hematemesis, inability to swallow, choking, diarrhea or constipation. Denies ever having endoscopy. Reports visiting ED earlier in the week and dx with GERD and rx pepcid and nexium, to which she reports mild improvement. Denies smoking, drinking.      Medical History:  has a past medical history of Chronic pain, Hyperlipidemia, Hypertension, and Stroke.    Surgical History:  has a past surgical history that includes Leg Tendon Surgery (Right) and Bunionectomy.    Interval Hx  S/p normal EGD on Thursday. Reports epigastric pressure improved with simethicone overnight.    Objective Findings:    Vital Signs:  Temp:  [97.8 °F (36.6 °C)-98.9 °F (37.2 °C)]   Pulse:  [77-93]   Resp:  [16-20]   BP: (104-150)/(59-83)   SpO2:  [95 %-100 %]   Body mass index is 26.08 kg/m².      Physical Exam  Vitals and nursing note reviewed.   Constitutional:       Appearance: Normal appearance.   HENT:      Head: Normocephalic.   Pulmonary:      Effort: Pulmonary effort is normal.   Abdominal:      General: Bowel sounds are normal.      Palpations: Abdomen is soft.   Skin:     General: Skin is warm and dry.   Neurological:      Mental Status: She is alert and oriented to person, place, and time.   Psychiatric:         Mood and Affect: Mood normal.         Behavior: Behavior normal.         Thought Content: Thought content normal.         Judgment: Judgment normal.               Labs:  Lab  Results   Component Value Date    WBC 6.85 2024    HGB 12.8 2024    HCT 40.8 2024     2024    CHOL 159 02/10/2020    TRIG 120 02/10/2020    HDL 45 02/10/2020    ALT 6 (L) 2024    AST 14 2024     2024    K 3.7 2024     2024    CREATININE 0.7 2024    BUN 4 (L) 2024    CO2 22 (L) 2024    TSH 2.032 2024    INR 1.1 2021    HGBA1C 5.4 2016             Imagin/3 US abdomen- The liver is normal in size measuring 14cm. Hepatic parenchyma is homogeneous without evidence for masses. No intra- or extrahepatic biliary ductal dilatation. The common bile duct measures 0.3 cm. The gallbladder appears normal. No evidence for cholelithiasis. Visualized portion of the pancreas is normal. No evidence for cholelithiasis.      Endoscopy:  EGD-  Normal esophagus.                          - Normal stomach. Biopsied.                          - Normal examined duodenum. see findings     I have independently reviewed and interpreted the imaging above           Epigastric pain. Nausea and vomiting. Bloating.  Plan/ Recommendations:  1.  S/p normal egd. Reports relief after simethicone admin overnight. Rec reg diet and  simethicone qid.         Will sign off  Thank you so much for allowing us to participate in the care of Grazyna Durand . Please contact us if you have any additional questions.    Ysabel Cortes NP  Gastroenterology  [facility] [place] [facility]

## 2024-04-05 NOTE — PLAN OF CARE
Problem: Adult Inpatient Plan of Care  Goal: Plan of Care Review  Outcome: Ongoing, Progressing  Flowsheets (Taken 4/5/2024 0516)  Plan of Care Reviewed With: patient  Goal: Absence of Hospital-Acquired Illness or Injury  Outcome: Ongoing, Progressing  Intervention: Identify and Manage Fall Risk  Flowsheets (Taken 4/5/2024 0516)  Safety Promotion/Fall Prevention:   assistive device/personal item within reach   Fall Risk reviewed with patient/family   side rails raised x 3  Goal: Optimal Comfort and Wellbeing  Outcome: Ongoing, Progressing  Intervention: Monitor Pain and Promote Comfort  Flowsheets (Taken 4/5/2024 0516)  Pain Management Interventions:   medication offered but refused   pain management plan reviewed with patient/caregiver   relaxation techniques promoted

## 2024-04-05 NOTE — NURSING
Report received, assumed care, assessment per flowsheet complete. Pt. Ambulating in room to bed with no difficutly. Radiology tech brought oral contrast to room and explained CT of abd and pelvis to pt. With verbal understanding received. Pt. Remained NPO until after test. Plan of care reviewed with pt. Fall and safety precautions in place. Call light within reach. Will continue to monitor. [facility], [facility]  Nurses Note -- 4 Eyes      4/5/2024       Skin assessed on: Q Shift      [x] No Pressure Injuries Present    [x]Prevention Measures Documented    [] Yes LDA  for Pressure Injury Previously documented     [] Yes New Pressure Injury Discovered   [] LDA for New Pressure Injury Added      Attending RN:  Umu Gant, RN     Second RN:  Ju LEW Rn

## 2024-04-05 NOTE — DISCHARGE SUMMARY
"[facility] Medicine  Discharge Summary      Patient Name: Grazyna Durand  MRN: [id number]  [facility]: [id number]  Patient Class: IP- Inpatient  Admission Date: 4/3/2024  Hospital Length of Stay: 1 days  Discharge Date and Time:  04/05/2024 11:52 AM  Attending Physician: Jennifer Hilario DO   Discharging Provider: Jenni Persaud NP  Primary Care Provider: Yvon Good MD    Primary Care Team: JENNI PERSAUD    HPI:   Grazyna Durand is a 68 y.o. with a pmh of hypertension, hyperlipidemia, GERD, anxiety, and history of stroke presents to the hospital with complaints of worsening epigastric abdominal pain for the past 2 weeks. Patient was recently seen in the ED for similar complaints and was discharged home on Pepcid and Nexium. However patient states that her symptoms have not gotten any better. She states that her epigastric pain is radiating to her back and feels like a "ball". Patient unable to tolerate p.o. and states that she feels like food is stuck in her chest. She states that she is hungry but unable to eat. She also dysphagia liquids and states that every time she drinks, she has pain in the middle of her chest.  Patient states that she has never seen a GI specialist and has never done endoscopy or colonoscopy but has an appointment upcoming with GI.  Patient denies any other alleviating or exacerbating factors.  Patient denies headache, blurry vision, vomiting, shortness on breath, hematemesis, hematochezia, hematuria, melena, or any other associated symptoms.    In the ED:  Patient afebrile without leukocytosis, hemodynamically stable, CBC unremarkable, BUN 4, calcium 10.7, total bilirubin 1.8, BNP and troponin levels normal, UA with trace leukocytes, D-dimer 0.81, chest x-ray with no acute process, CTA chest shows no evidence of PE or no acute intrathoracic abnormality, ultrasound abdomen shows no significant abnormalities with no biliary ductal dilation and normal-appearing gallbladder.  " Patient given  acetaminophen 1000 mg, GI cocktail, morphine 2 mg IV x2, pantoprazole 40 mg IV,, and 1 L NS bolus in the ED.    Case discussed with ED provider and patient will be placed under observation for further medical management.    Procedure(s) (LRB):  EGD (ESOPHAGOGASTRODUODENOSCOPY) (N/A)      Hospital Course:   Admission to the hospital for epigastric pain similar to previous visits to the ED. EGD showed normal esophagus stomach and examined duodenum.  GI recommended CT of the abdomen with and without contrast which showed no acute intra abdominal process.  Patient had a good bowel movement overnight and GI symptoms resolved.  Patient tolerated regular diet for breakfast.  Patient stable for discharge home with close follow up with PCP and GI.  All findings and plan were explained to the patient. All questions and concerns were answered. Patient verbalized understanding. Patient is in stable condition to d/c home and has been informed to follow up with her PCP.      Goals of Care Treatment Preferences:  Code Status: Full Code      Consults:   Consults (From admission, onward)          Status Ordering Provider     Inpatient virtual consult to Hospital Medicine  Once        Provider:  Ana Paula Tsang MD    Completed ALIX PERSAUD     Inpatient consult to Gastroenterology  Once        Provider:  Ysabel Cortes NP    Completed NICKI BURRELL            No new Assessment & Plan notes have been filed under this hospital service since the last note was generated.  Service: Hospital Medicine    Final Active Diagnoses:    Diagnosis Date Noted POA    PRINCIPAL PROBLEM:  Epigastric abdominal pain [R10.13] 04/03/2024 Yes    Dysphagia [R13.10] 04/04/2024 Yes    History of stroke [Z86.73] 02/10/2020 Not Applicable    Hyperlipidemia [E78.5] 04/08/2016 Yes    Gastroesophageal reflux disease without esophagitis [K21.9] 03/31/2016 Yes    Chest pain, atypical [R07.89] 03/31/2016 Yes    Essential hypertension [I10]  03/29/2016 Yes      Problems Resolved During this Admission:       Discharged Condition: good    Disposition: Home or Self Care    Follow Up:   Follow-up Information       Yvon Good MD Follow up.    Specialty: Internal Medicine  Contact information:  [address] [facility] [address]  [phone]               Reza Phillips MD Follow up on 4/11/2024.    Why: at 10:30am  Contact information:  Reza Phillips MD   [address] [place] [address]   [id number]   [place] [address]   [phone] (Work)                         Patient Instructions:      Diet Cardiac     Diet Cardiac     Notify your health care provider if you experience any of the following:  temperature >100.4     Notify your health care provider if you experience any of the following:  difficulty breathing or increased cough     Notify your health care provider if you experience any of the following:  increased confusion or weakness     Notify your health care provider if you experience any of the following:  redness, tenderness, or signs of infection (pain, swelling, redness, odor or green/yellow discharge around incision site)     Notify your health care provider if you experience any of the following:  temperature >100.4     Notify your health care provider if you experience any of the following:  difficulty breathing or increased cough     Notify your health care provider if you experience any of the following:  increased confusion or weakness     Activity as tolerated     Activity as tolerated       Significant Diagnostic Studies: N/A    Pending Diagnostic Studies:       Procedure Component Value Units Date/Time    Specimen to Pathology, Surgery Gastrointestinal tract [[id number]] Collected: 04/04/24 1508    Order Status: Sent Lab Status: In process Updated: 04/05/24 0915    Specimen: Tissue            Medications:  Reconciled Home Medications:      Medication List        CONTINUE taking these medications      amLODIPine 10 MG  tablet  Commonly known as: NORVASC  Take 10 mg by mouth once daily.     atorvastatin 40 MG tablet  Commonly known as: LIPITOR  Take 40 mg by mouth once daily.     citalopram 40 MG tablet  Commonly known as: CeleXA  Take 40 mg by mouth.     esomeprazole 40 MG capsule  Commonly known as: NEXIUM  Take 1 capsule (40 mg total) by mouth before breakfast.     fluticasone propionate 50 mcg/actuation nasal spray  Commonly known as: FLONASE  1 spray by Each Nostril route once daily.     QUEtiapine 50 MG tablet  Commonly known as: SEROQUEL  Take 50 mg by mouth every evening.            STOP taking these medications      famotidine 40 MG tablet  Commonly known as: PEPCID     potassium bicarbonate disintegrating tablet  Commonly known as: K-LYTE              Indwelling Lines/Drains at time of discharge:   Lines/Drains/Airways       None                   Time spent on the discharge of patient: 35  minutes         Jenni Zhu NP  Department of Hospital Medicine  [facility]

## 2024-04-05 NOTE — PROGRESS NOTES
"[facility] Medicine  Progress Note    Patient Name: Grazyna Durand  MRN: [id number]  Patient Class: IP- Inpatient   Admission Date: 4/3/2024  Length of Stay: 1 days  Attending Physician: Jennifer Hilario DO  Primary Care Provider: Yvon Good MD        Subjective:     Principal Problem:Chest pain, atypical        HPI:  Grazyna Durand is a 68 y.o. with a pmh of hypertension, hyperlipidemia, GERD, anxiety, and history of stroke presents to the hospital with complaints of worsening epigastric abdominal pain for the past 2 weeks. Patient was recently seen in the ED for similar complaints and was discharged home on Pepcid and Nexium. However patient states that her symptoms have not gotten any better. She states that her epigastric pain is radiating to her back and feels like a "ball". Patient unable to tolerate p.o. and states that she feels like food is stuck in her chest. She states that she is hungry but unable to eat. She also dysphagia liquids and states that every time she drinks, she has pain in the middle of her chest.  Patient states that she has never seen a GI specialist and has never done endoscopy or colonoscopy but has an appointment upcoming with GI.  Patient denies any other alleviating or exacerbating factors.  Patient denies headache, blurry vision, vomiting, shortness on breath, hematemesis, hematochezia, hematuria, melena, or any other associated symptoms.    In the ED:  Patient afebrile without leukocytosis, hemodynamically stable, CBC unremarkable, BUN 4, calcium 10.7, total bilirubin 1.8, BNP and troponin levels normal, UA with trace leukocytes, D-dimer 0.81, chest x-ray with no acute process, CTA chest shows no evidence of PE or no acute intrathoracic abnormality, ultrasound abdomen shows no significant abnormalities with no biliary ductal dilation and normal-appearing gallbladder.  Patient given  acetaminophen 1000 mg, GI cocktail, morphine 2 mg IV x2, pantoprazole 40 mg " IV,, and 1 L NS bolus in the ED.    Case discussed with ED provider and patient will be placed under observation for further medical management.    Overview/Hospital Course:  Admission to the hospital for epigastric pain similar to previous visits to the ED. EGD showed normal esophagus stomach and examined duodenum.  GI recommended CT of the abdomen with and without contrast which showed no acute intra abdominal process.  Patient had a good bowel movement overnight and GI symptoms resolved.  Patient tolerated regular diet for breakfast.  Patient stable for discharge home with close follow up with PCP and GI.  All findings and plan were explained to the patient. All questions and concerns were answered. Patient verbalized understanding. Patient is in stable condition to d/c home and has been informed to follow up with her PCP.     4/5/2024   DC cancel due to chest pain and left side numbness/blurred vision.   EKG  Wtc 744. Follow up troponin, 2 D echo   CT head     Interval History: dc cancel. States left side numbness last night but did not tell staff. 1145 left side numbness and blurry vision. States left chest wall pressure then told nurse later mid sternal chest pain.     Review of Systems   Constitutional: Negative.    HENT: Negative.     Respiratory:  Positive for chest tightness.    Cardiovascular:  Negative for chest pain.        Lower midsternal region   Gastrointestinal:  Abdominal pain: resolved.        Epigastric abdominal pain radiating to the back, indigestion, feeling of food stuck in chest   Genitourinary: Negative.    Musculoskeletal: Negative.    Skin: Negative.    Neurological:  Positive for numbness.        Left hand numbness (history of CVA)   Psychiatric/Behavioral: Negative.       Objective:     Vital Signs (Most Recent):  Temp: 98.2 °F (36.8 °C) (04/05/24 1217)  Pulse: 102 (04/05/24 1224)  Resp: (!) 2 (04/05/24 1217)  BP: 127/80 (04/05/24 1224)  SpO2: 99 % (04/05/24 1224) Vital Signs  (24h Range):  Temp:  [97.8 °F (36.6 °C)-98.9 °F (37.2 °C)] 98.2 °F (36.8 °C)  Pulse:  [] 102  Resp:  [2-20] 2  SpO2:  [95 %-100 %] 99 %  BP: (104-150)/(59-83) 127/80     Weight: 62.6 kg (138 lb 0.1 oz)  Body mass index is 26.08 kg/m².    Intake/Output Summary (Last 24 hours) at 4/5/2024 1233  Last data filed at 4/5/2024 0845  Gross per 24 hour   Intake 1060 ml   Output --   Net 1060 ml         Physical Exam  Constitutional:       General: She is not in acute distress.     Appearance: Normal appearance. She is not ill-appearing or diaphoretic.   HENT:      Mouth/Throat:      Mouth: Mucous membranes are moist.   Eyes:      Extraocular Movements: Extraocular movements intact.   Cardiovascular:      Rate and Rhythm: Normal rate.      Pulses: Normal pulses.   Pulmonary:      Effort: Pulmonary effort is normal. No respiratory distress.      Comments: Speaking in full sentences on RA  Abdominal:      General: Abdomen is flat.      Palpations: Abdomen is soft.      Tenderness: Tenderness: resolved. There is no guarding or rebound.   Musculoskeletal:      Right lower leg: No edema.      Left lower leg: No edema.   Lymphadenopathy:      Cervical: No cervical adenopathy.   Skin:     General: Skin is warm.   Neurological:      General: No focal deficit present.      Mental Status: She is alert and oriented to person, place, and time. Mental status is at baseline.   Psychiatric:         Mood and Affect: Mood normal.         Behavior: Behavior normal.         Thought Content: Thought content normal.             Significant Labs: All pertinent labs within the past 24 hours have been reviewed.    Significant Imaging: I have reviewed all pertinent imaging results/findings within the past 24 hours.    Assessment/Plan:      * Chest pain, atypical  -Patient presents with epigastric abdominal pain/midsternal chest pain.  Likely esophagitis versus esophageal ulcer versus stricture? Low suspicion of cardiac origin with normal  troponin and EKG without ischemic changes.  -4/5/2024 EKG ST no evidence of ischemia.   -Follow up troponin, 2  D echo   -ASA allergy listed  -continue statin    History of stroke  History noted, history of CVA in 2020 and patient with left upper extremity weakness and numbness.  -4/5/2024 on discharge patient states left sided numbness and blurred vision in both eyes. States experience numbness last night but not sure how long.   On exam, no focal deficit on exam and good strength in all extremities 5/5  CT head now, work up for chest pain see above     Epigastric abdominal pain  -Patient presents with worsening epigastric abdominal pain for the past 2 weeks. Was recently here in the ED about 5 days ago and discharged home with Nexium and Pepcid. Patient states that those medications have not helped. Patient unable to tolerate p.o. and every time she attempts, she has pain in her epigastric region that radiates to the back.    -Chest x-ray and ultrasound abdomen with no acute findings.  -CTA  chest no PE. US abd no gallstones no intra or extrahepatic biliary ductal dilatation.   -No history of endoscopy or colonoscopy however has appointment coming up with GI in a week  -4/3/25 EGD showed normal esophagus stomach and examined duodenum.    -GI recommended CT of the abdomen plevis with and without contrast.  -Diet as tolerated  -Resolved       Dysphagia  -See plan for epigastric abdominal pain    Hyperlipidemia  Continue home statin therapy    Gastroesophageal reflux disease without esophagitis  History noted, patient on PPI daily  Continue home medications and GI consulted for evaluation for dysphagia/epigastric pain    Essential hypertension  Chronic, controlled. Latest blood pressure and vitals reviewed-     Temp:  [97.8 °F (36.6 °C)-98.8 °F (37.1 °C)]   Pulse:  []   Resp:  [10-20]   BP: (110-155)/(74-91)   SpO2:  [96 %-100 %] .   Home meds for hypertension were reviewed and noted below.   Hypertension  Medications               amLODIPine (NORVASC) 10 MG tablet Take 10 mg by mouth once daily.            While in the hospital, will manage blood pressure as follows; Continue home antihypertensive regimen    Will utilize p.r.n. blood pressure medication only if patient's blood pressure greater than 180/110 and she develops symptoms such as worsening chest pain or shortness of breath.      VTE Risk Mitigation (From admission, onward)           Ordered     IP VTE LOW RISK PATIENT  Once         04/03/24 2254     Place sequential compression device  Until discontinued         04/03/24 2254                    Discharge Planning   PARAS: 4/5/2024     Code Status: Prior   Is the patient medically ready for discharge?:     Reason for patient still in hospital (select all that apply): Treatment           Jenni Zuh NP  Department of Hospital Medicine   [facility] [place] [facility]

## 2024-04-05 NOTE — ASSESSMENT & PLAN NOTE
-Patient presents with worsening epigastric abdominal pain for the past 2 weeks. Was recently here in the ED about 5 days ago and discharged home with Nexium and Pepcid. Patient states that those medications have not helped. Patient unable to tolerate p.o. and every time she attempts, she has pain in her epigastric region that radiates to the back.    -Chest x-ray and ultrasound abdomen with no acute findings.  -CTA  chest no PE. US abd no gallstones no intra or extrahepatic biliary ductal dilatation.   -No history of endoscopy or colonoscopy however has appointment coming up with GI in a week  -4/3/25 EGD showed normal esophagus stomach and examined duodenum.    -GI recommended CT of the abdomen plevis with and without contrast.  -Diet as tolerated  -Resolved

## 2024-04-05 NOTE — ASSESSMENT & PLAN NOTE
-Patient presents with epigastric abdominal pain/midsternal chest pain.  Likely esophagitis versus esophageal ulcer versus stricture? Low suspicion of cardiac origin with normal troponin and EKG without ischemic changes.  -4/5/2024 EKG ST no evidence of ischemia.   -Follow up troponin, 2  D echo   -ASA allergy listed  -continue statin

## 2024-04-05 NOTE — ASSESSMENT & PLAN NOTE
-Patient presents with worsening epigastric abdominal pain for the past 2 weeks. Was recently here in the ED about 5 days ago and discharged home with Nexium and Pepcid. Patient states that those medications have not helped. Patient unable to tolerate p.o. and every time she attempts, she has pain in her epigastric region that radiates to the back.    -Chest x-ray and ultrasound abdomen with no acute findings.  -CTA  chest no PE. US abd no gallstones no intra or extrahepatic biliary ductal dilatation.   -No history of endoscopy or colonoscopy however has appointment coming up with GI in a week  -4/3/25 EGD showed normal esophagus stomach and examined duodenum.    -GI recommended CT of the abdomen plevis with and without contrast.  -Diet as tolerated

## 2024-04-05 NOTE — PLAN OF CARE
Case Management Final Discharge Note    Discharge Disposition: home    Relevant SDOH / Transition of Care Barriers:  none    Primary Caretaker and contact information: Lizzy Silveira (Daughter)   [phone] (Mobile)     Scheduled followup appointment:      Follow-up Information       Reza Phillips MD Follow up on 4/11/2024.    Why: at 10:30am  Contact information:  Reza Phillips MD   [address] [place] [address]   [id number]   [place] [address]   [phone] (Work)             Melanie Louis -. Schedule an appointment as soon as possible for a visit.    Why: Patient will call for an appt.  Contact information:  [address] [facility] [facility]  [place] [id number] [phone]                                 Referrals placed: sent    Transportation: Family        Patient and family educated on discharge services and updated on DC plan. Barbie/Bedside RN notified, patient clear to discharge from Case Management Perspective.    04/05/24 1607   Final Note   Assessment Type Final Discharge Note   Anticipated Discharge Disposition Home   What phone number can be called within the next 1-3 days to see how you are doing after discharge?   (see chart)   Hospital Resources/Appts/Education Provided Provided patient/caregiver with written discharge plan information;Appointments scheduled and added to AVS   Post-Acute Status   Discharge Delays None known at this time

## 2024-04-05 NOTE — ASSESSMENT & PLAN NOTE
68 y.o. with medical history of HTN, HLD, CVA, anxiety, stress disorder - with admission concerns of epigastric abdominal pain / dysphagia. Focal neurological symptoms during the hospital stay - and neurology has been consulted for the same.     C/o left sided numbness / similar to prior stroke symptoms / with no focal motor deficits or cranial nerve deficits. Background of stressors. CT head negative for acute findings     Hospital course / Patient afebrile without leukocytosis, hemodynamically stable, CBC unremarkable, BUN 4, calcium 10.7, total bilirubin 1.8, BNP and troponin levels normal, UA with trace leukocytes, D-dimer 0.81, chest x-ray with no acute process, CTA chest shows no evidence of PE or no acute intrathoracic abnormality, ultrasound abdomen shows no significant abnormalities with no biliary ductal dilation and normal-appearing gallbladder. Admission to the hospital for epigastric pain similar to previous visits to the ED. EGD showed normal esophagus stomach and examined duodenum.  GI recommended CT of the abdomen with and without contrast which showed no acute intra abdominal process.     Recs:  Low suspicion for focal cerebrovascular ischemic event or epileptic or neuro inflammatory etiology - suspect recrudescence      - MRI brain wo contrast    If MRI brain positive for acute infarct follow the below   -  / Plavix 300 mg load - followed by DAPT X 21 day, ASA 81/Plavix 75, with asa thereafter  - target LDL < 70 / Continue atorvastatin  - euglycemic goals   - permissive HTN x 72 hrs post index event / long term < 140/90  - Echo f/u    - PT/OT recs - discharge planning   - F/u PCP for risk factor modification monitoring  -  on DASH diet; exercise and lifestyle modifications when appropriate   Provide stroke counseling provided during the visit - Identification of signs; emergency action and ER attention; Risk factor control, optimization for secondary stroke prevention; Compliance to  medications   F/u vascular neurology

## 2024-04-05 NOTE — PROGRESS NOTES
"[facility] Medicine  Progress Note    Patient Name: Grazyna Durand  MRN: [id number]  Patient Class: IP- Inpatient   Admission Date: 4/3/2024  Length of Stay: 0 days  Attending Physician: Zena Combs,*  Primary Care Provider: Yvon Good MD        Subjective:     Principal Problem:Epigastric abdominal pain        HPI:  Grazyna Durand is a 68 y.o. with a pmh of hypertension, hyperlipidemia, GERD, anxiety, and history of stroke presents to the hospital with complaints of worsening epigastric abdominal pain for the past 2 weeks. Patient was recently seen in the ED for similar complaints and was discharged home on Pepcid and Nexium. However patient states that her symptoms have not gotten any better. She states that her epigastric pain is radiating to her back and feels like a "ball". Patient unable to tolerate p.o. and states that she feels like food is stuck in her chest. She states that she is hungry but unable to eat. She also dysphagia liquids and states that every time she drinks, she has pain in the middle of her chest.  Patient states that she has never seen a GI specialist and has never done endoscopy or colonoscopy but has an appointment upcoming with GI.  Patient denies any other alleviating or exacerbating factors.  Patient denies headache, blurry vision, vomiting, shortness on breath, hematemesis, hematochezia, hematuria, melena, or any other associated symptoms.    In the ED:  Patient afebrile without leukocytosis, hemodynamically stable, CBC unremarkable, BUN 4, calcium 10.7, total bilirubin 1.8, BNP and troponin levels normal, UA with trace leukocytes, D-dimer 0.81, chest x-ray with no acute process, CTA chest shows no evidence of PE or no acute intrathoracic abnormality, ultrasound abdomen shows no significant abnormalities with no biliary ductal dilation and normal-appearing gallbladder.  Patient given  acetaminophen 1000 mg, GI cocktail, morphine 2 mg IV x2, " pantoprazole 40 mg IV,, and 1 L NS bolus in the ED.    Case discussed with ED provider and patient will be placed under observation for further medical management.    Overview/Hospital Course:  Admission to the hospital for epigastric pain similar to previous visits to the ED. EGD showed normal esophagus stomach and examined duodenum.  GI recommended CT of the abdomen with and without contrast.    Interval History: acid reflux after apple juice. Epigastric pain still present but improving. Afraid to eat.     Review of Systems   Constitutional: Negative.    HENT: Negative.     Respiratory: Negative.     Cardiovascular:  Negative for chest pain.        Lower midsternal region   Gastrointestinal:  Positive for abdominal pain.        Epigastric abdominal pain radiating to the back, indigestion, feeling of food stuck in chest   Genitourinary: Negative.    Musculoskeletal: Negative.    Skin: Negative.    Neurological:  Positive for numbness.        Left hand numbness (history of CVA)   Psychiatric/Behavioral: Negative.       Objective:     Vital Signs (Most Recent):  Temp: 98.8 °F (37.1 °C) (04/04/24 1751)  Pulse: 78 (04/04/24 1751)  Resp: 18 (04/04/24 1751)  BP: 139/78 (04/04/24 1751)  SpO2: 97 % (04/04/24 1751) Vital Signs (24h Range):  Temp:  [97.8 °F (36.6 °C)-98.8 °F (37.1 °C)] 98.8 °F (37.1 °C)  Pulse:  [68-99] 78  Resp:  [12-20] 18  SpO2:  [95 %-100 %] 97 %  BP: (104-155)/(59-91) 139/78     Weight: 62.6 kg (138 lb 0.1 oz)  Body mass index is 26.08 kg/m².    Intake/Output Summary (Last 24 hours) at 4/4/2024 1857  Last data filed at 4/4/2024 1745  Gross per 24 hour   Intake 1660 ml   Output --   Net 1660 ml         Physical Exam  Constitutional:       General: She is not in acute distress.     Appearance: Normal appearance. She is not ill-appearing or diaphoretic.   HENT:      Mouth/Throat:      Mouth: Mucous membranes are moist.   Eyes:      Extraocular Movements: Extraocular movements intact.   Cardiovascular:       Rate and Rhythm: Normal rate.      Pulses: Normal pulses.   Pulmonary:      Effort: Pulmonary effort is normal. No respiratory distress.      Comments: Speaking in full sentences on RA  Abdominal:      General: Abdomen is flat.      Palpations: Abdomen is soft.      Tenderness: There is abdominal tenderness (epigastric). There is no guarding or rebound.   Musculoskeletal:      Right lower leg: No edema.      Left lower leg: No edema.   Skin:     General: Skin is warm.   Neurological:      General: No focal deficit present.      Mental Status: She is alert and oriented to person, place, and time. Mental status is at baseline.   Psychiatric:         Mood and Affect: Mood normal.         Behavior: Behavior normal.         Thought Content: Thought content normal.             Significant Labs: All pertinent labs within the past 24 hours have been reviewed.    Significant Imaging: I have reviewed all pertinent imaging results/findings within the past 24 hours.    Assessment/Plan:      * Epigastric abdominal pain  -Patient presents with worsening epigastric abdominal pain for the past 2 weeks. Was recently here in the ED about 5 days ago and discharged home with Nexium and Pepcid. Patient states that those medications have not helped. Patient unable to tolerate p.o. and every time she attempts, she has pain in her epigastric region that radiates to the back.    -Chest x-ray and ultrasound abdomen with no acute findings.  -CTA  chest no PE. US abd no gallstones no intra or extrahepatic biliary ductal dilatation.   -No history of endoscopy or colonoscopy however has appointment coming up with GI in a week  -4/3/25 EGD showed normal esophagus stomach and examined duodenum.    -GI recommended CT of the abdomen plevis with and without contrast.  -Diet as tolerated        Dysphagia  -See plan for epigastric abdominal pain    History of stroke  History noted, history of CVA in 2020 and patient with left upper extremity weakness  and numbness.    Hyperlipidemia  Continue home statin therapy    Chest pain, atypical  -Patient presents with epigastric abdominal pain/midsternal chest pain.  Likely esophagitis versus esophageal ulcer versus stricture? Low suspicion of cardiac origin with normal troponin and EKG without ischemic changes.  -Trend troponin  -Continue to monitor    Gastroesophageal reflux disease without esophagitis  History noted, patient on PPI daily  Continue home medications and GI consulted for evaluation for dysphagia/epigastric pain    Essential hypertension  Chronic, controlled. Latest blood pressure and vitals reviewed-     Temp:  [97.8 °F (36.6 °C)-98.8 °F (37.1 °C)]   Pulse:  []   Resp:  [10-20]   BP: (110-155)/(74-91)   SpO2:  [96 %-100 %] .   Home meds for hypertension were reviewed and noted below.   Hypertension Medications               amLODIPine (NORVASC) 10 MG tablet Take 10 mg by mouth once daily.            While in the hospital, will manage blood pressure as follows; Continue home antihypertensive regimen    Will utilize p.r.n. blood pressure medication only if patient's blood pressure greater than 180/110 and she develops symptoms such as worsening chest pain or shortness of breath.      VTE Risk Mitigation (From admission, onward)           Ordered     IP VTE LOW RISK PATIENT  Once         04/03/24 2254     Place sequential compression device  Until discontinued         04/03/24 2254                    Discharge Planning   PARAS: 4/4/2024     Code Status: Full Code   Is the patient medically ready for discharge?:     Reason for patient still in hospital (select all that apply): Treatment             Jenni Zhu NP  Department of Hospital Medicine   [facility]

## 2024-04-05 NOTE — CONSULTS
[facility] Medicine  Telemedicine Consult Note       Patient has been accepted for transfer to [facility] and will be followed through telemedicine services beginning  at 7 AM.      Ana Paula Tsang MD  [facility] Medicine Staff

## 2024-04-07 NOTE — DISCHARGE SUMMARY
"[facility] Medicine  Discharge Summary      Patient Name: Grazyna Durand  MRN: [id number]  DYLON: [id number]  Patient Class: OP- Observation  Admission Date: 4/3/2024  Hospital Length of Stay: 1 days  Discharge Date and Time: 4/5/2024  6:32 PM  Attending Physician: Noreen Hilario DO  Discharging Provider: Jenni Persaud NP  Primary Care Provider: Yvon Good MD    Primary Care Team: JENNI PERSAUD    HPI:   Grazyna Durand is a 68 y.o. with a pmh of hypertension, hyperlipidemia, GERD, anxiety, and history of stroke presents to the hospital with complaints of worsening epigastric abdominal pain for the past 2 weeks. Patient was recently seen in the ED for similar complaints and was discharged home on Pepcid and Nexium. However patient states that her symptoms have not gotten any better. She states that her epigastric pain is radiating to her back and feels like a "ball". Patient unable to tolerate p.o. and states that she feels like food is stuck in her chest. She states that she is hungry but unable to eat. She also dysphagia liquids and states that every time she drinks, she has pain in the middle of her chest.  Patient states that she has never seen a GI specialist and has never done endoscopy or colonoscopy but has an appointment upcoming with GI.  Patient denies any other alleviating or exacerbating factors.  Patient denies headache, blurry vision, vomiting, shortness on breath, hematemesis, hematochezia, hematuria, melena, or any other associated symptoms.    In the ED:  Patient afebrile without leukocytosis, hemodynamically stable, CBC unremarkable, BUN 4, calcium 10.7, total bilirubin 1.8, BNP and troponin levels normal, UA with trace leukocytes, D-dimer 0.81, chest x-ray with no acute process, CTA chest shows no evidence of PE or no acute intrathoracic abnormality, ultrasound abdomen shows no significant abnormalities with no biliary ductal dilation and normal-appearing gallbladder.  Patient " given  acetaminophen 1000 mg, GI cocktail, morphine 2 mg IV x2, pantoprazole 40 mg IV,, and 1 L NS bolus in the ED.    Case discussed with ED provider and patient will be placed under observation for further medical management.    Procedure(s) (LRB):  EGD (ESOPHAGOGASTRODUODENOSCOPY) (N/A)      Hospital Course:   Admission to the hospital for epigastric pain similar to previous visits to the ED. EGD showed normal esophagus stomach and examined duodenum.  GI recommended CT of the abdomen with and without contrast which showed no acute intra abdominal process.  Patient had a good bowel movement overnight and GI symptoms resolved.  Patient tolerated regular diet for breakfast.   Patient also had a episode of chest pain with left side numbness/blurred vision in both eyes. Denied headache or eye pain. Patient lifted left leg up but when asked to lift right leg, patient stated unable to lift left leg but realized soon  left leg was already lifted. Patient ambulated in room all day with no issues. Patient have chronic numbness on left side due to previous stroke. MRI brain no acute stroke. Was seen by Neurology and possible recrudescence.   EKG  Wtc 744. Repeat troponin negative, 2 D echo normal EF 55-60% , grade I DD and normal wall motion.   Spoke with Daughter Keith via phone (lives in [place]) who reported patient continues to grieve sister death and anxious. Continue celexa. Will will need close follow up PCP.   Patient is HDS for discharge home.      Goals of Care Treatment Preferences:  Code Status: Full Code      Consults:   Consults (From admission, onward)          Status Ordering Provider     Inpatient consult to Telemedicine-General Neurology  Once        Provider:  Ramsey Montano MD    Completed ALIX PERSAUD     Inpatient virtual consult to Hospital Medicine  Once        Provider:  Ana Paula Tsang MD    Completed ALIX PERSAUD     Inpatient consult to Gastroenterology  Once         Provider:  Ysabel Cortes NP    Completed NICKI BURRELL            No new Assessment & Plan notes have been filed under this hospital service since the last note was generated.  Service: Hospital Medicine    Final Active Diagnoses:    Diagnosis Date Noted POA    PRINCIPAL PROBLEM:  Chest pain, atypical [R07.89] 03/31/2016 Yes    History of stroke [Z86.73] 02/10/2020 Not Applicable    Epigastric abdominal pain [R10.13] 04/03/2024 Yes    Dysphagia [R13.10] 04/04/2024 Yes    Hyperlipidemia [E78.5] 04/08/2016 Yes    Gastroesophageal reflux disease without esophagitis [K21.9] 03/31/2016 Yes    Essential hypertension [I10] 03/29/2016 Yes      Problems Resolved During this Admission:       Discharged Condition: stable    Disposition: Home or Self Care    Follow Up:   Follow-up Information       Reza Phillips MD Follow up on 4/11/2024.    Why: at 10:30am  Contact information:  Reza Phillips MD   [address] [place] [address]   [id number]   [place] [address] [phone] (Work)             [facility] Schedule an appointment as soon as possible for a visit.    Why: Patient will call for an appt.  Contact information:  [facility]  [place] [address] [phone]                           Patient Instructions:      Diet Cardiac     Diet Cardiac     Diet Cardiac     Notify your health care provider if you experience any of the following:  temperature >100.4     Notify your health care provider if you experience any of the following:  difficulty breathing or increased cough     Notify your health care provider if you experience any of the following:  increased confusion or weakness     Notify your health care provider if you experience any of the following:  redness, tenderness, or signs of infection (pain, swelling, redness, odor or green/yellow discharge around incision site)     Notify your health care provider if you experience any of the following:  temperature >100.4     Notify your health care  provider if you experience any of the following:  difficulty breathing or increased cough     Notify your health care provider if you experience any of the following:  increased confusion or weakness     Notify your health care provider if you experience any of the following:  temperature >100.4     Notify your health care provider if you experience any of the following:  increased confusion or weakness     Activity as tolerated     Activity as tolerated     Activity as tolerated       Significant Diagnostic Studies: N/A    Pending Diagnostic Studies:       Procedure Component Value Units Date/Time    Specimen to Pathology, Surgery Gastrointestinal tract [[id number]] Collected: 04/04/24 1508    Order Status: Sent Lab Status: In process Updated: 04/05/24 0915    Specimen: Tissue            Medications:  Reconciled Home Medications:      Medication List        CONTINUE taking these medications      amLODIPine 10 MG tablet  Commonly known as: NORVASC  Take 10 mg by mouth once daily.     atorvastatin 40 MG tablet  Commonly known as: LIPITOR  Take 40 mg by mouth once daily.     citalopram 40 MG tablet  Commonly known as: CeleXA  Take 40 mg by mouth.     esomeprazole 40 MG capsule  Commonly known as: NEXIUM  Take 1 capsule (40 mg total) by mouth before breakfast.     fluticasone propionate 50 mcg/actuation nasal spray  Commonly known as: FLONASE  1 spray by Each Nostril route once daily.     QUEtiapine 50 MG tablet  Commonly known as: SEROQUEL  Take 50 mg by mouth every evening.            STOP taking these medications      famotidine 40 MG tablet  Commonly known as: PEPCID     potassium bicarbonate disintegrating tablet  Commonly known as: K-LYTE              Indwelling Lines/Drains at time of discharge:   Lines/Drains/Airways       None                   Time spent on the discharge of patient: 35 minutes         Jenni Zhu NP  Department of Hospital Medicine  [facility]

## 2024-04-10 ENCOUNTER — PATIENT MESSAGE (OUTPATIENT)
Dept: GASTROENTEROLOGY | Facility: CLINIC | Age: 69
End: 2024-04-10
Payer: MEDICARE

## 2024-04-10 LAB
FINAL PATHOLOGIC DIAGNOSIS: NORMAL
GROSS: NORMAL
Lab: NORMAL

## 2024-09-23 DIAGNOSIS — Z12.31 OTHER SCREENING MAMMOGRAM: Primary | ICD-10-CM

## 2024-09-27 ENCOUNTER — HOSPITAL ENCOUNTER (OUTPATIENT)
Dept: RADIOLOGY | Facility: HOSPITAL | Age: 69
Discharge: HOME | End: 2024-09-27
Attending: NURSE PRACTITIONER
Payer: MEDICARE

## 2024-09-27 VITALS — HEIGHT: 61 IN | WEIGHT: 138 LBS | BODY MASS INDEX: 26.06 KG/M2

## 2024-09-27 DIAGNOSIS — Z12.31 OTHER SCREENING MAMMOGRAM: ICD-10-CM

## 2024-09-27 PROCEDURE — 77067 SCR MAMMO BI INCL CAD: CPT | Mod: 26,,, | Performed by: RADIOLOGY

## 2024-09-27 PROCEDURE — 77067 SCR MAMMO BI INCL CAD: CPT | Mod: TC

## 2024-09-27 PROCEDURE — 77063 BREAST TOMOSYNTHESIS BI: CPT | Mod: 26,,, | Performed by: RADIOLOGY

## 2024-10-10 ENCOUNTER — HOSPITAL ENCOUNTER (EMERGENCY)
Facility: HOSPITAL | Age: 69
Discharge: HOME | End: 2024-10-10
Attending: EMERGENCY MEDICINE
Payer: MEDICARE

## 2024-10-10 VITALS
DIASTOLIC BLOOD PRESSURE: 76 MMHG | OXYGEN SATURATION: 100 % | HEIGHT: 61 IN | RESPIRATION RATE: 16 BRPM | BODY MASS INDEX: 29.27 KG/M2 | WEIGHT: 155 LBS | SYSTOLIC BLOOD PRESSURE: 110 MMHG | TEMPERATURE: 98 F | HEART RATE: 83 BPM

## 2024-10-10 DIAGNOSIS — R94.31 ABNORMAL EKG: ICD-10-CM

## 2024-10-10 DIAGNOSIS — K21.9 GERD (GASTROESOPHAGEAL REFLUX DISEASE): Primary | ICD-10-CM

## 2024-10-10 DIAGNOSIS — R10.13 EPIGASTRIC ABDOMINAL PAIN: ICD-10-CM

## 2024-10-10 LAB
ALBUMIN SERPL BCP-MCNC: 4.3 G/DL (ref 3.5–5.2)
ALP SERPL-CCNC: 71 U/L (ref 55–135)
ALT SERPL W/O P-5'-P-CCNC: 6 U/L (ref 10–44)
ANION GAP SERPL CALC-SCNC: 12 MMOL/L (ref 8–16)
AST SERPL-CCNC: 14 U/L (ref 10–40)
BASOPHILS # BLD AUTO: 0.03 K/UL (ref 0–0.2)
BASOPHILS NFR BLD: 0.5 % (ref 0–1.9)
BILIRUB SERPL-MCNC: 1.6 MG/DL (ref 0.1–1)
BNP SERPL-MCNC: <10 PG/ML (ref 0–99)
BUN SERPL-MCNC: 12 MG/DL (ref 8–23)
CALCIUM SERPL-MCNC: 9.8 MG/DL (ref 8.7–10.5)
CHLORIDE SERPL-SCNC: 104 MMOL/L (ref 95–110)
CO2 SERPL-SCNC: 23 MMOL/L (ref 23–29)
CREAT SERPL-MCNC: 0.9 MG/DL (ref 0.5–1.4)
DIFFERENTIAL METHOD BLD: ABNORMAL
EOSINOPHIL # BLD AUTO: 0 K/UL (ref 0–0.5)
EOSINOPHIL NFR BLD: 0.7 % (ref 0–8)
ERYTHROCYTE [DISTWIDTH] IN BLOOD BY AUTOMATED COUNT: 13.1 % (ref 11.5–14.5)
EST. GFR  (NO RACE VARIABLE): >60 ML/MIN/1.73 M^2
GLUCOSE SERPL-MCNC: 118 MG/DL (ref 70–110)
HCT VFR BLD AUTO: 42.3 % (ref 37–48.5)
HGB BLD-MCNC: 13.3 G/DL (ref 12–16)
IMM GRANULOCYTES # BLD AUTO: 0.01 K/UL (ref 0–0.04)
IMM GRANULOCYTES NFR BLD AUTO: 0.2 % (ref 0–0.5)
LIPASE SERPL-CCNC: 38 U/L (ref 4–60)
LYMPHOCYTES # BLD AUTO: 2.3 K/UL (ref 1–4.8)
LYMPHOCYTES NFR BLD: 42.5 % (ref 18–48)
MAGNESIUM SERPL-MCNC: 2.2 MG/DL (ref 1.6–2.6)
MCH RBC QN AUTO: 26.6 PG (ref 27–31)
MCHC RBC AUTO-ENTMCNC: 31.4 G/DL (ref 32–36)
MCV RBC AUTO: 85 FL (ref 82–98)
MONOCYTES # BLD AUTO: 0.6 K/UL (ref 0.3–1)
MONOCYTES NFR BLD: 10.8 % (ref 4–15)
NEUTROPHILS # BLD AUTO: 2.5 K/UL (ref 1.8–7.7)
NEUTROPHILS NFR BLD: 45.3 % (ref 38–73)
NRBC BLD-RTO: 0 /100 WBC
OHS QRS DURATION: 80 MS
OHS QTC CALCULATION: 443 MS
PLATELET # BLD AUTO: 260 K/UL (ref 150–450)
PMV BLD AUTO: 9.6 FL (ref 9.2–12.9)
POTASSIUM SERPL-SCNC: 3 MMOL/L (ref 3.5–5.1)
PROT SERPL-MCNC: 7.2 G/DL (ref 6–8.4)
RBC # BLD AUTO: 5 M/UL (ref 4–5.4)
SODIUM SERPL-SCNC: 139 MMOL/L (ref 136–145)
TROPONIN I SERPL DL<=0.01 NG/ML-MCNC: <0.006 NG/ML (ref 0–0.03)
WBC # BLD AUTO: 5.46 K/UL (ref 3.9–12.7)

## 2024-10-10 PROCEDURE — 93005 ELECTROCARDIOGRAM TRACING: CPT

## 2024-10-10 PROCEDURE — 25000003 PHARM REV CODE 250: Performed by: EMERGENCY MEDICINE

## 2024-10-10 PROCEDURE — 96375 TX/PRO/DX INJ NEW DRUG ADDON: CPT

## 2024-10-10 PROCEDURE — 85025 COMPLETE CBC W/AUTO DIFF WBC: CPT | Performed by: EMERGENCY MEDICINE

## 2024-10-10 PROCEDURE — 99285 EMERGENCY DEPT VISIT HI MDM: CPT | Mod: 25

## 2024-10-10 PROCEDURE — 84484 ASSAY OF TROPONIN QUANT: CPT | Performed by: EMERGENCY MEDICINE

## 2024-10-10 PROCEDURE — 83735 ASSAY OF MAGNESIUM: CPT | Performed by: EMERGENCY MEDICINE

## 2024-10-10 PROCEDURE — 63600175 PHARM REV CODE 636 W HCPCS: Performed by: EMERGENCY MEDICINE

## 2024-10-10 PROCEDURE — 83690 ASSAY OF LIPASE: CPT | Performed by: EMERGENCY MEDICINE

## 2024-10-10 PROCEDURE — 83880 ASSAY OF NATRIURETIC PEPTIDE: CPT | Performed by: EMERGENCY MEDICINE

## 2024-10-10 PROCEDURE — 80053 COMPREHEN METABOLIC PANEL: CPT | Performed by: EMERGENCY MEDICINE

## 2024-10-10 PROCEDURE — 96372 THER/PROPH/DIAG INJ SC/IM: CPT | Performed by: EMERGENCY MEDICINE

## 2024-10-10 PROCEDURE — 93010 ELECTROCARDIOGRAM REPORT: CPT | Mod: ,,, | Performed by: INTERNAL MEDICINE

## 2024-10-10 PROCEDURE — 96374 THER/PROPH/DIAG INJ IV PUSH: CPT

## 2024-10-10 RX ORDER — ALUMINUM HYDROXIDE, MAGNESIUM HYDROXIDE, AND SIMETHICONE 1200; 120; 1200 MG/30ML; MG/30ML; MG/30ML
30 SUSPENSION ORAL ONCE
Status: COMPLETED | OUTPATIENT
Start: 2024-10-10 | End: 2024-10-10

## 2024-10-10 RX ORDER — FAMOTIDINE 10 MG/ML
40 INJECTION INTRAVENOUS
Status: COMPLETED | OUTPATIENT
Start: 2024-10-10 | End: 2024-10-10

## 2024-10-10 RX ORDER — PANTOPRAZOLE SODIUM 40 MG/10ML
80 INJECTION, POWDER, LYOPHILIZED, FOR SOLUTION INTRAVENOUS
Status: COMPLETED | OUTPATIENT
Start: 2024-10-10 | End: 2024-10-10

## 2024-10-10 RX ORDER — SUCRALFATE 1 G/10ML
1 SUSPENSION ORAL
Status: COMPLETED | OUTPATIENT
Start: 2024-10-10 | End: 2024-10-10

## 2024-10-10 RX ORDER — PANTOPRAZOLE SODIUM 40 MG/1
40 TABLET, DELAYED RELEASE ORAL 2 TIMES DAILY
COMMUNITY

## 2024-10-10 RX ORDER — POTASSIUM CHLORIDE 20 MEQ/1
60 TABLET, EXTENDED RELEASE ORAL
Status: COMPLETED | OUTPATIENT
Start: 2024-10-10 | End: 2024-10-10

## 2024-10-10 RX ORDER — SUCRALFATE 1 G/10ML
1 SUSPENSION ORAL EVERY 6 HOURS PRN
Qty: 414 ML | Refills: 3 | Status: SHIPPED | OUTPATIENT
Start: 2024-10-10

## 2024-10-10 RX ORDER — DICYCLOMINE HYDROCHLORIDE 20 MG/1
20 TABLET ORAL EVERY 6 HOURS PRN
Qty: 20 TABLET | Refills: 3 | Status: SHIPPED | OUTPATIENT
Start: 2024-10-10 | End: 2024-11-09

## 2024-10-10 RX ORDER — METOCLOPRAMIDE 10 MG/1
10 TABLET ORAL EVERY 6 HOURS PRN
Qty: 30 TABLET | Refills: 3 | Status: SHIPPED | OUTPATIENT
Start: 2024-10-10

## 2024-10-10 RX ORDER — FAMOTIDINE 20 MG/1
20 TABLET, FILM COATED ORAL 2 TIMES DAILY
Qty: 60 TABLET | Refills: 3 | Status: SHIPPED | OUTPATIENT
Start: 2024-10-10 | End: 2025-10-10

## 2024-10-10 RX ORDER — DICYCLOMINE HYDROCHLORIDE 10 MG/ML
20 INJECTION INTRAMUSCULAR
Status: COMPLETED | OUTPATIENT
Start: 2024-10-10 | End: 2024-10-10

## 2024-10-10 RX ORDER — LIDOCAINE HYDROCHLORIDE 20 MG/ML
15 SOLUTION OROPHARYNGEAL ONCE
Status: COMPLETED | OUTPATIENT
Start: 2024-10-10 | End: 2024-10-10

## 2024-10-10 RX ORDER — METOCLOPRAMIDE HYDROCHLORIDE 5 MG/ML
10 INJECTION INTRAMUSCULAR; INTRAVENOUS
Status: COMPLETED | OUTPATIENT
Start: 2024-10-10 | End: 2024-10-10

## 2024-10-10 RX ADMIN — PANTOPRAZOLE SODIUM 80 MG: 40 INJECTION, POWDER, LYOPHILIZED, FOR SOLUTION INTRAVENOUS at 04:10

## 2024-10-10 RX ADMIN — ALUMINUM HYDROXIDE, MAGNESIUM HYDROXIDE, AND SIMETHICONE 30 ML: 1200; 120; 1200 SUSPENSION ORAL at 02:10

## 2024-10-10 RX ADMIN — METOCLOPRAMIDE 10 MG: 5 INJECTION, SOLUTION INTRAMUSCULAR; INTRAVENOUS at 04:10

## 2024-10-10 RX ADMIN — SUCRALFATE 1 G: 1 SUSPENSION ORAL at 04:10

## 2024-10-10 RX ADMIN — LIDOCAINE HYDROCHLORIDE 15 ML: 20 SOLUTION ORAL at 02:10

## 2024-10-10 RX ADMIN — FAMOTIDINE 40 MG: 10 INJECTION, SOLUTION INTRAVENOUS at 02:10

## 2024-10-10 RX ADMIN — DICYCLOMINE HYDROCHLORIDE 20 MG: 10 INJECTION, SOLUTION INTRAMUSCULAR at 04:10

## 2024-10-10 RX ADMIN — POTASSIUM CHLORIDE 60 MEQ: 1500 TABLET, EXTENDED RELEASE ORAL at 04:10

## 2024-10-10 NOTE — ED TRIAGE NOTES
Pt arrived c/o worsening acid reflux and is experiencing constant 10/10 pain in her midsternal area radiating to her back associated with SOB upon activity that began last Friday. Pt reports she went to PCP and ER where they prescribed GI cocktails but states the pain remains unresolved. Denies eating spicy/citrus foods/drinking, cardiac hx, weakness, dizziness, n/v, or HA. States GERD is a common occurrence for her but it has never been this bad. Pt is AAOX4, VSS, and NADN.

## 2024-10-10 NOTE — ED PROVIDER NOTES
Encounter Date: 10/10/2024       History     Chief Complaint   Patient presents with    Gastroesophageal Reflux     Seen at an ER in [place] and diagnosed with Gerd, pt still has the burning and acid feeling     70 yo female with GERD on pantoprazole 40mg PO BID presents via personal transportation with burning epigastric pain associated with nausea ongoing for the last week.  Patient was seen for similar last week (Friday 10/4/24) in [place] and was tx'ed with a GI cocktail, but states her pain has not improved.  She reports decreased appetite and decreased oral intake due to bloating.  Patient states that she feels like if she could burp, she would feel better.  Patient reports mild shortness of breath for the last 2 days.  No da chest pain.  No coughing.  Patient denies blood in stool or diarrhea.      [facility].  Labs were reassuring.  Patient received IV reglan 10mg, IV famotidine 20mg, and PO GI cocktail.      Patient was OBS'ed here 4/3/24-4/4/24 for similar.  She had reassuring RUQ ultrasound 4/3/24 and reassuring CT abdomen/pelvis 4/4/24.  Patient also had upper GI 4/4/24 without abnormality.          Review of patient's allergies indicates:   Allergen Reactions    Benadryl [diphenhydramine hcl]      cuase her bp to elevate    Prednisone Other (See Comments)     Reactions:  Increased blood pressure    Asa-calcium carb-mag-aluminum Other (See Comments)     Acid reflux  Acid reflux      Aspirin Other (See Comments)    Ibuprofen Other (See Comments)     Hx of stroke per patient. Patient  states that she cannot take.    Penicillins     Powder base no.200 Rash     Powder in gloves causes rash on hands, Denies any reaction to latex products     Past Medical History:   Diagnosis Date    Chronic pain     left arm    Hyperlipidemia     Hypertension     Stroke     2019     Past Surgical History:   Procedure Laterality Date    BUNIONECTOMY      ESOPHAGOGASTRODUODENOSCOPY  N/A 4/4/2024    Procedure: EGD (ESOPHAGOGASTRODUODENOSCOPY);  Surgeon: Sudarshan Murdock MD;  Location: [facility];  Service: Endoscopy;  Laterality: N/A;    LEG TENDON SURGERY Right      Family History   Problem Relation Name Age of Onset    Cancer Mother      No Known Problems Father      Ovarian cancer Sister       Social History     Tobacco Use    Smoking status: Never    Smokeless tobacco: Never   Substance Use Topics    Alcohol use: Not Currently     Comment: quit 10/19    Drug use: No     Review of Systems   Constitutional:  Positive for appetite change. Negative for fatigue and fever.   HENT:  Negative for sore throat.    Eyes:  Negative for photophobia.   Respiratory:  Negative for shortness of breath.    Cardiovascular:  Negative for chest pain.   Gastrointestinal:  Positive for abdominal pain and nausea. Negative for blood in stool, diarrhea and vomiting.   Genitourinary:  Negative for flank pain.   Musculoskeletal:  Negative for gait problem.   Skin:  Negative for rash.   Neurological:  Positive for weakness (chronic, left-sided due to prior CVA). Negative for headaches.       Physical Exam     Initial Vitals [10/10/24 0206]   BP Pulse Resp Temp SpO2   (!) 148/89 108 16 98.4 °F (36.9 °C) 98 %      MAP       --         Physical Exam    Nursing note and vitals reviewed.  Constitutional: She appears well-developed and well-nourished. She is not diaphoretic.   Awake, alert, nontoxic, speaking in complete sentences.   HENT:   Head: Normocephalic and atraumatic. Mouth/Throat: Oropharynx is clear and moist.   Eyes: Conjunctivae and EOM are normal. Pupils are equal, round, and reactive to light.   Neck: Neck supple.   Normal range of motion.  Cardiovascular:  Normal rate, regular rhythm and intact distal pulses.           Pulmonary/Chest: Breath sounds normal. No respiratory distress. She has no wheezes. She has no rhonchi. She has no rales.   Abdominal: Abdomen is soft. There is abdominal tenderness (epigastric).  There is no rebound and no guarding.   Musculoskeletal:         General: No tenderness or edema. Normal range of motion.      Cervical back: Normal range of motion and neck supple.     Neurological: She is alert and oriented to person, place, and time.   Moving all extremities.  Left-sided weakness noted.   Skin: Skin is warm and dry. No erythema. No pallor.   Psychiatric: She has a normal mood and affect.         ED Course   Procedures  Labs Reviewed   COMPREHENSIVE METABOLIC PANEL - Abnormal       Result Value    Sodium 139      Potassium 3.0 (*)     Chloride 104      CO2 23      Glucose 118 (*)     BUN 12      Creatinine 0.9      Calcium 9.8      Total Protein 7.2      Albumin 4.3      Total Bilirubin 1.6 (*)     Alkaline Phosphatase 71      AST 14      ALT 6 (*)     eGFR >60      Anion Gap 12     CBC W/ AUTO DIFFERENTIAL - Abnormal    WBC 5.46      RBC 5.00      Hemoglobin 13.3      Hematocrit 42.3      MCV 85      MCH 26.6 (*)     MCHC 31.4 (*)     RDW 13.1      Platelets 260      MPV 9.6      Immature Granulocytes 0.2      Gran # (ANC) 2.5      Immature Grans (Abs) 0.01      Lymph # 2.3      Mono # 0.6      Eos # 0.0      Baso # 0.03      nRBC 0      Gran % 45.3      Lymph % 42.5      Mono % 10.8      Eosinophil % 0.7      Basophil % 0.5      Differential Method Automated     B-TYPE NATRIURETIC PEPTIDE    BNP <10     MAGNESIUM    Magnesium 2.2     LIPASE    Lipase 38     TROPONIN I    Troponin I <0.006     ALCOHOL,MEDICAL (ETHANOL)     EKG Readings: (Independently Interpreted)   02:11: Sinus tach, . Normal axis. TWI in II, III, aVF, V6. No ectopy. No STEMI. C/w April 2024.      ECG Results              EKG 12-lead (Final result)        Collection Time Result Time QRS Duration OHS QTC Calculation    10/10/24 02:11:37 10/10/24 07:25:53 80 443                     Final result by Interface, Lab In [facility] (10/10/24 07:26:01)                   Narrative:    Test Reason : R10.13,    Vent. Rate : 102 BPM      "Atrial Rate : 102 BPM     P-R Int : 148 ms          QRS Dur : 080 ms      QT Int : 340 ms       P-R-T Axes : 064 073 -42 degrees     QTc Int : 443 ms    Sinus tachycardia  Cannot rule out Inferior infarct ,age undetermined  Possible Anterior infarct (cited on or before 03-APR-2024)  T wave abnormality, consider lateral ischemia  Abnormal ECG  When compared with ECG of 03-APR-2024 10:34,  No significant change was found  Confirmed by Erasmo Laguerre MD ([id number]) on 10/10/2024 7:25:51 AM    Referred By: RAYMOND   SELF           Confirmed By:Erasmo Laguerre MD                                  Imaging Results              X-Ray Chest AP Portable (Final result)  Result time 10/10/24 03:19:53      Final result by Sanket Castillo MD (10/10/24 03:19:53)                   Impression:      No acute cardiopulmonary finding identified on this single view.    Questionable small nodule overlying the left upper lobe.  This finding could be artifactual as it overlaps the left 6th rib and this finding was not seen on prior CT chest in April 2024.  Follow-up outpatient PA and lateral views may provide improved sensitivity if there is persistent clinical concern.      Electronically signed by: Sanket Castillo MD  Date:    10/10/2024  Time:    03:19               Narrative:    EXAMINATION:  XR CHEST AP PORTABLE    CLINICAL HISTORY:  Provided history is "epigastric pain;  ".    TECHNIQUE:  One view of the chest.    COMPARISON:  04/03/2024.    FINDINGS:  Cardiac silhouette is not enlarged.  Questionable nodule versus artifact overlapping the left upper lung, though this finding was not seen on prior chest radiograph and chest CTA dated 04/03/2024.  No confluent area of consolidation.  No sizable pleural effusion.  No pneumothorax.                                       Medications   famotidine (PF) injection 40 mg (40 mg Intravenous Given 10/10/24 0251)   aluminum-magnesium hydroxide-simethicone 200-200-20 mg/5 mL suspension 30 mL (30 " mLs Oral Given 10/10/24 0249)     And   LIDOcaine viscous HCl 2% oral solution 15 mL (15 mLs Oral Given 10/10/24 0249)   potassium chloride SA CR tablet 60 mEq (60 mEq Oral Given 10/10/24 0410)   pantoprazole injection 80 mg (80 mg Intravenous Given 10/10/24 0412)   dicyclomine injection 20 mg (20 mg Intramuscular Given 10/10/24 0420)   sucralfate 100 mg/mL suspension 1 g (1 g Oral Given 10/10/24 0420)   metoclopramide injection 10 mg (10 mg Intravenous Given 10/10/24 0416)     Medical Decision Making  70 yo female with history of GERD now with burning epigastric pain x 1 week.    Ddx includes ACS, dissection, PE, CHF, GERD, PUD, pancreatitis, biliary colic, choledocholithiasis, cholangitis, cholecystitis, other.    EKG no STEMI.    CXR NAD.    CBC, CMP, lipase, troponin, BNP reassuring.  K 3.0; other lytes okay.    Patient initially received IV famotidine 40mg and GI cocktail but continued to have symptoms.  Patient then received IV reglan 10mg, IV protonix 80mg, IM bentyl 20mg, and PO carafate 1g, as well as KCl 60mEq for low K.  Patient felt much better after these meds.  She stated she felt comfortable going home.      I advised patient to continue using pantoprazole BID, but I have rx'ed famotidine BID as well, and PRN reglan and carafate.    Amount and/or Complexity of Data Reviewed  Labs: ordered.  Radiology: ordered.    Risk  OTC drugs.  Prescription drug management.                                      Clinical Impression:  Final diagnoses:  [K21.9] GERD (gastroesophageal reflux disease) (Primary)  [R10.13] Epigastric abdominal pain  [R94.31] Abnormal EKG          ED Disposition Condition    Discharge Stable          ED Prescriptions       Medication Sig Dispense Start Date End Date Auth. Provider    sucralfate (CARAFATE) 100 mg/mL suspension Take 10 mLs (1 g total) by mouth every 6 (six) hours as needed (indigestion, heartburn). 414 mL 10/10/2024 -- Daphne Lopez MD    metoclopramide HCl (REGLAN) 10  MG tablet Take 1 tablet (10 mg total) by mouth every 6 (six) hours as needed (nausea, indigestion). 30 tablet 10/10/2024 -- Daphne Lopez MD    famotidine (PEPCID) 20 MG tablet Take 1 tablet (20 mg total) by mouth 2 (two) times daily. 60 tablet 10/10/2024 10/10/2025 Daphne Lopez MD    dicyclomine (BENTYL) 20 mg tablet Take 1 tablet (20 mg total) by mouth every 6 (six) hours as needed (indigestion, heartburn). 20 tablet 10/10/2024 11/9/2024 Daphne Lopez MD          Follow-up Information       Follow up With Specialties Details Why Contact Info    Yvon Good MD Internal Medicine  As needed [address] [place] [id number]  [phone]               Daphne Lopez MD  10/10/24 0787

## 2024-10-10 NOTE — ED NOTES
"Pt states the GI cocktail brought pain down to 8/10. She "feels like she has to burp and its not moving"  "

## 2024-10-10 NOTE — ED NOTES
"Pt states after all medications pain is a 6/10. "I feel a lot better compared to when I came in"  "

## 2025-04-20 ENCOUNTER — HOSPITAL ENCOUNTER (EMERGENCY)
Facility: HOSPITAL | Age: 70
Discharge: HOME | End: 2025-04-20
Attending: EMERGENCY MEDICINE
Payer: MEDICARE

## 2025-04-20 VITALS
HEIGHT: 61 IN | OXYGEN SATURATION: 98 % | RESPIRATION RATE: 20 BRPM | TEMPERATURE: 98 F | SYSTOLIC BLOOD PRESSURE: 131 MMHG | WEIGHT: 161 LBS | BODY MASS INDEX: 30.4 KG/M2 | HEART RATE: 104 BPM | DIASTOLIC BLOOD PRESSURE: 90 MMHG

## 2025-04-20 DIAGNOSIS — B35.9 RINGWORM: Primary | ICD-10-CM

## 2025-04-20 PROCEDURE — 99282 EMERGENCY DEPT VISIT SF MDM: CPT

## 2025-04-20 PROCEDURE — 25000003 PHARM REV CODE 250: Performed by: NURSE PRACTITIONER

## 2025-04-20 RX ORDER — DOXYLAMINE SUCCINATE 25 MG
TABLET ORAL 2 TIMES DAILY
Qty: 30 G | Refills: 0 | Status: SHIPPED | OUTPATIENT
Start: 2025-04-20

## 2025-04-20 RX ORDER — DOXYLAMINE SUCCINATE 25 MG
TABLET ORAL
Status: COMPLETED | OUTPATIENT
Start: 2025-04-20 | End: 2025-04-20

## 2025-04-20 RX ADMIN — MICONAZOLE NITRATE: 20 CREAM TOPICAL at 11:04

## 2025-04-20 NOTE — ED PROVIDER NOTES
Encounter Date: 4/20/2025       History     Chief Complaint   Patient presents with    Wound Check     Pt presents to ER with complaints of a sore on the back of her right leg times 1 month. Pt states she has been here and to  and it has not healed. Pt states now it burns and hurts 9/10. Pt denies any other issues. Pt currently on antibx.      Ms. Durand is a 69-year-old female who presents the emergency department with complaint of a reddened spot to her right lower posterior leg she reports it has been present approximately 1 month and that she has tried multiple medications and has been to an urgent care.  States it burns and hurts 9/10 severity.  Denies any injuries to the area.  She has tried mupirocin cream as well as Valtrex oral.  She denies any other rash.    The history is provided by the patient. No  was used.     Review of patient's allergies indicates:   Allergen Reactions    Benadryl [diphenhydramine hcl]      cuase her bp to elevate    Prednisone Other (See Comments)     Reactions:  Increased blood pressure    Asa-calcium carb-mag-aluminum Other (See Comments)     Acid reflux  Acid reflux      Aspirin Other (See Comments)    Ibuprofen Other (See Comments)     Hx of stroke per patient. Patient  states that she cannot take.    Penicillins     Powder base no.200 Rash     Powder in gloves causes rash on hands, Denies any reaction to latex products     Past Medical History:   Diagnosis Date    Chronic pain     left arm    Hyperlipidemia     Hypertension     Stroke     2019     Past Surgical History:   Procedure Laterality Date    BUNIONECTOMY      ESOPHAGOGASTRODUODENOSCOPY N/A 4/4/2024    Procedure: EGD (ESOPHAGOGASTRODUODENOSCOPY);  Surgeon: Sudarshan Murdock MD;  Location: [facility];  Service: Endoscopy;  Laterality: N/A;    LEG TENDON SURGERY Right      Family History   Problem Relation Name Age of Onset    Cancer Mother      No Known Problems Father      Ovarian cancer Sister        Social History[1]  Review of Systems   Skin:  Positive for rash.       Physical Exam     Initial Vitals [04/20/25 1109]   BP Pulse Resp Temp SpO2   (!) 131/90 104 20 98.1 °F (36.7 °C) 98 %      MAP       --         Physical Exam    Nursing note and vitals reviewed.  Constitutional: She appears well-developed and well-nourished.   HENT:   Head: Normocephalic and atraumatic.   Right Ear: External ear normal.   Left Ear: External ear normal.   Nose: Nose normal.   Eyes: Conjunctivae and EOM are normal. Pupils are equal, round, and reactive to light. Right eye exhibits no discharge. Left eye exhibits no discharge.   Neck:   Normal range of motion.  Abdominal: She exhibits no distension.   Musculoskeletal:         General: Normal range of motion.      Cervical back: Normal range of motion.     Neurological: She is alert and oriented to person, place, and time.   Skin: Skin is dry. Capillary refill takes less than 2 seconds.   3 cm round annular reddened area to the right lower leg with a central clearing.         ED Course   Procedures  Labs Reviewed - No data to display       Imaging Results    None          Medications   miconazole 2 % cream ( Topical (Top) Given 4/20/25 1131)     Medical Decision Making  Ms. Durand is a 69-year-old female who presents the emergency department with complaint of a reddened spot to her right lower posterior leg she reports it has been present approximately 1 month and that she has tried multiple medications and has been to an urgent care.  States it burns and hurts 9/10 severity.  Denies any injuries to the area.  She has tried mupirocin cream as well as Valtrex oral.  She denies any other rash.    3 cm round annular reddened area to the right lower leg with a central clearing.     Differential diagnosis includes shingles, bacterial skin infection, ringworm    Problems Addressed:  Ringworm: acute illness or injury     Details: Provided miconazole cream and prescribed the same for use  "for 4 weeks.  The patient knows that may take up to the long full response.  Advised use of Tylenol ibuprofen for discomfort.  Follow up as directed.    Amount and/or Complexity of Data Reviewed  Discussion of management or test interpretation with external provider(s): Vital signs at the time of disposition were:  BP (!) 131/90 (BP Location: Right arm)   Pulse 104   Temp 98.1 °F (36.7 °C) (Oral)   Resp 20   Ht 5' 1" (1.549 m)   Wt 73 kg (161 lb)   SpO2 98%   BMI 30.42 kg/m²       See AVS for additional recommendations. Medications listed herein were prescribed after reviewing the patient's allergies, medication list, history, most recent laboratories as available.  Referrals below were provided after reviewing the patient's previous medical providers. She understands she  should return for any worsening or changes in condition.  Prior to discharge the patient was asked if she  had any additional concerns or complaints and she declined. The patient was given an opportunity to ask questions and all were answered to her satisfaction.     Risk  OTC drugs.                                      Clinical Impression:  Final diagnoses:  [B35.9] Ringworm (Primary)          ED Disposition Condition    Discharge Good          ED Prescriptions       Medication Sig Dispense Start Date End Date Auth. Provider    miconazole (MICOTIN) 2 % cream Apply topically 2 (two) times daily. 30 g 4/20/2025 -- Augusto Oden DNP          Follow-up Information       Follow up With Specialties Details Why Contact Info    Yvon Good MD Internal Medicine Schedule an appointment as soon as possible for a visit   [address] [place] [id number]  [phone]                 [1]   Social History  Tobacco Use    Smoking status: Never    Smokeless tobacco: Never   Substance Use Topics    Alcohol use: Not Currently     Comment: quit 10/19    Drug use: No        Augusto Oden DNP  04/20/25 1216    "

## 2025-04-20 NOTE — DISCHARGE INSTRUCTIONS
Use prescribed cream for 4 weeks at least to see resolution.    Return to the Emergency Department for any worsening, change in condition, or any emergent concerns.